# Patient Record
Sex: MALE | Race: WHITE | Employment: OTHER | URBAN - METROPOLITAN AREA
[De-identification: names, ages, dates, MRNs, and addresses within clinical notes are randomized per-mention and may not be internally consistent; named-entity substitution may affect disease eponyms.]

---

## 2017-01-26 ENCOUNTER — GENERIC CONVERSION - ENCOUNTER (OUTPATIENT)
Dept: OTHER | Facility: OTHER | Age: 82
End: 2017-01-26

## 2017-01-26 LAB
LEFT EYE DIABETIC RETINOPATHY: NORMAL
RIGHT EYE DIABETIC RETINOPATHY: NORMAL

## 2017-01-27 ENCOUNTER — GENERIC CONVERSION - ENCOUNTER (OUTPATIENT)
Dept: OTHER | Facility: OTHER | Age: 82
End: 2017-01-27

## 2017-02-14 ENCOUNTER — GENERIC CONVERSION - ENCOUNTER (OUTPATIENT)
Dept: OTHER | Facility: OTHER | Age: 82
End: 2017-02-14

## 2017-02-15 LAB — T4 FREE SERPL-MCNC: 1.22 NG/DL (ref 0.82–1.77)

## 2017-03-24 ENCOUNTER — ALLSCRIPTS OFFICE VISIT (OUTPATIENT)
Dept: OTHER | Facility: OTHER | Age: 82
End: 2017-03-24

## 2017-03-24 LAB — OCCULT BLD, FECAL IMMUNOLOGICAL (HISTORICAL): NEGATIVE

## 2017-03-27 ENCOUNTER — GENERIC CONVERSION - ENCOUNTER (OUTPATIENT)
Dept: OTHER | Facility: OTHER | Age: 82
End: 2017-03-27

## 2017-03-29 ENCOUNTER — ALLSCRIPTS OFFICE VISIT (OUTPATIENT)
Dept: OTHER | Facility: OTHER | Age: 82
End: 2017-03-29

## 2017-08-03 ENCOUNTER — GENERIC CONVERSION - ENCOUNTER (OUTPATIENT)
Dept: OTHER | Facility: OTHER | Age: 82
End: 2017-08-03

## 2017-08-03 LAB
25(OH)D3 SERPL-MCNC: 60.8 NG/ML (ref 30–100)
A/G RATIO (HISTORICAL): 1.5 (ref 1.2–2.2)
ALBUMIN SERPL BCP-MCNC: 4.3 G/DL (ref 3.5–4.7)
ALP SERPL-CCNC: 69 IU/L (ref 39–117)
ALT SERPL W P-5'-P-CCNC: 22 IU/L (ref 0–44)
AST SERPL W P-5'-P-CCNC: 28 IU/L (ref 0–40)
BASOPHILS # BLD AUTO: 0.1 X10E3/UL (ref 0–0.2)
BASOPHILS # BLD AUTO: 1 %
BILIRUB SERPL-MCNC: 0.6 MG/DL (ref 0–1.2)
BUN SERPL-MCNC: 17 MG/DL (ref 8–27)
BUN/CREA RATIO (HISTORICAL): 14 (ref 10–24)
CALCIUM SERPL-MCNC: 9.3 MG/DL (ref 8.6–10.2)
CHLORIDE SERPL-SCNC: 100 MMOL/L (ref 96–106)
CHOLEST SERPL-MCNC: 145 MG/DL (ref 100–199)
CHOLEST/HDLC SERPL: 2.2 RATIO UNITS (ref 0–5)
CO2 SERPL-SCNC: 25 MMOL/L (ref 18–29)
CREAT SERPL-MCNC: 1.21 MG/DL (ref 0.76–1.27)
DEPRECATED RDW RBC AUTO: 17 % (ref 12.3–15.4)
EGFR AFRICAN AMERICAN (HISTORICAL): 65 ML/MIN/1.73
EGFR-AMERICAN CALC (HISTORICAL): 56 ML/MIN/1.73
EOSINOPHIL # BLD AUTO: 0.2 X10E3/UL (ref 0–0.4)
EOSINOPHIL # BLD AUTO: 4 %
GGT (HISTORICAL): 18 IU/L (ref 0–65)
GLUCOSE SERPL-MCNC: 103 MG/DL (ref 65–99)
HBA1C MFR BLD HPLC: 5.5 % (ref 4.8–5.6)
HCT VFR BLD AUTO: 34.4 % (ref 37.5–51)
HDLC SERPL-MCNC: 66 MG/DL
HGB BLD-MCNC: 11.1 G/DL (ref 12.6–17.7)
IMM.GRANULOCYTES (CD4/8) (HISTORICAL): 0 %
IMM.GRANULOCYTES (CD4/8) (HISTORICAL): 0 X10E3/UL (ref 0–0.1)
LDLC SERPL CALC-MCNC: 68 MG/DL (ref 0–99)
LYMPHOCYTES # BLD AUTO: 1.4 X10E3/UL (ref 0.7–3.1)
LYMPHOCYTES # BLD AUTO: 28 %
MAGNESIUM SERPL-MCNC: 1.7 MG/DL (ref 1.6–2.3)
MCH RBC QN AUTO: 25.4 PG (ref 26.6–33)
MCHC RBC AUTO-ENTMCNC: 32.3 G/DL (ref 31.5–35.7)
MCV RBC AUTO: 79 FL (ref 79–97)
MONOCYTES # BLD AUTO: 0.7 X10E3/UL (ref 0.1–0.9)
MONOCYTES (HISTORICAL): 14 %
NEUTROPHILS # BLD AUTO: 2.6 X10E3/UL (ref 1.4–7)
NEUTROPHILS # BLD AUTO: 53 %
PLATELET # BLD AUTO: 216 X10E3/UL (ref 150–379)
POTASSIUM SERPL-SCNC: 4.5 MMOL/L (ref 3.5–5.2)
RBC (HISTORICAL): 4.37 X10E6/UL (ref 4.14–5.8)
SODIUM SERPL-SCNC: 141 MMOL/L (ref 134–144)
TOT. GLOBULIN, SERUM (HISTORICAL): 2.8 G/DL (ref 1.5–4.5)
TOTAL PROTEIN (HISTORICAL): 7.1 G/DL (ref 6–8.5)
TRIGL SERPL-MCNC: 54 MG/DL (ref 0–149)
VLDLC SERPL CALC-MCNC: 11 MG/DL (ref 5–40)
WBC # BLD AUTO: 4.9 X10E3/UL (ref 3.4–10.8)

## 2017-08-04 LAB
BACTERIA UR QL AUTO: ABNORMAL
BILIRUB UR QL STRIP: NEGATIVE
COLOR UR: YELLOW
COMMENT (HISTORICAL): CLEAR
FECAL OCCULT BLOOD DIAGNOSTIC (HISTORICAL): NEGATIVE
GLUCOSE (HISTORICAL): NEGATIVE
KETONES UR STRIP-MCNC: NEGATIVE MG/DL
LEUKOCYTE ESTERASE UR QL STRIP: NEGATIVE
MICROSCOPIC EXAMINATION (HISTORICAL): ABNORMAL
MUCUS THREADS (HISTORICAL): PRESENT
NITRITE UR QL STRIP: NEGATIVE
NON-SQ EPI CELLS URNS QL MICRO: ABNORMAL /HPF
PH UR STRIP.AUTO: 6 [PH] (ref 5–7.5)
PROT UR STRIP-MCNC: ABNORMAL MG/DL
RBC (HISTORICAL): ABNORMAL /HPF
SP GR UR STRIP.AUTO: 1.02 (ref 1–1.03)
T4 FREE SERPL-MCNC: 1.13 NG/DL (ref 0.82–1.77)
TSH SERPL DL<=0.05 MIU/L-ACNC: 4.68 UIU/ML (ref 0.45–4.5)
UROBILINOGEN UR QL STRIP.AUTO: 0.2 EU/DL (ref 0.2–1)
WBC # BLD AUTO: ABNORMAL /HPF

## 2017-08-09 ENCOUNTER — ALLSCRIPTS OFFICE VISIT (OUTPATIENT)
Dept: OTHER | Facility: OTHER | Age: 82
End: 2017-08-09

## 2017-10-18 ENCOUNTER — TRANSCRIBE ORDERS (OUTPATIENT)
Dept: LAB | Facility: HOSPITAL | Age: 82
End: 2017-10-18

## 2017-10-18 ENCOUNTER — APPOINTMENT (OUTPATIENT)
Dept: LAB | Facility: HOSPITAL | Age: 82
End: 2017-10-18
Attending: INTERNAL MEDICINE
Payer: COMMERCIAL

## 2017-10-18 DIAGNOSIS — M35.3 POLYMYALGIA RHEUMATICA (HCC): Primary | ICD-10-CM

## 2017-10-18 DIAGNOSIS — M35.3 POLYMYALGIA RHEUMATICA (HCC): ICD-10-CM

## 2017-10-18 DIAGNOSIS — G44.89 OTHER HEADACHE SYNDROME: ICD-10-CM

## 2017-10-18 DIAGNOSIS — M31.5 GIANT CELL ARTERITIS WITH POLYMYALGIA RHEUMATICA (HCC): ICD-10-CM

## 2017-10-18 LAB
BASOPHILS # BLD AUTO: 0.05 THOUSANDS/ΜL (ref 0–0.1)
BASOPHILS NFR BLD AUTO: 1 % (ref 0–1)
CRP SERPL QL: <3 MG/L
EOSINOPHIL # BLD AUTO: 0.1 THOUSAND/ΜL (ref 0–0.61)
EOSINOPHIL NFR BLD AUTO: 2 % (ref 0–6)
ERYTHROCYTE [DISTWIDTH] IN BLOOD BY AUTOMATED COUNT: 19.4 % (ref 11.6–15.1)
ERYTHROCYTE [SEDIMENTATION RATE] IN BLOOD: 5 MM/HOUR (ref 0–10)
HCT VFR BLD AUTO: 38.3 % (ref 36.5–49.3)
HGB BLD-MCNC: 12.8 G/DL (ref 12–17)
LYMPHOCYTES # BLD AUTO: 1.28 THOUSANDS/ΜL (ref 0.6–4.47)
LYMPHOCYTES NFR BLD AUTO: 19 % (ref 14–44)
MCH RBC QN AUTO: 28 PG (ref 26.8–34.3)
MCHC RBC AUTO-ENTMCNC: 33.4 G/DL (ref 31.4–37.4)
MCV RBC AUTO: 84 FL (ref 82–98)
MONOCYTES # BLD AUTO: 0.63 THOUSAND/ΜL (ref 0.17–1.22)
MONOCYTES NFR BLD AUTO: 9 % (ref 4–12)
NEUTROPHILS # BLD AUTO: 4.68 THOUSANDS/ΜL (ref 1.85–7.62)
NEUTS SEG NFR BLD AUTO: 69 % (ref 43–75)
NRBC BLD AUTO-RTO: 0 /100 WBCS
PLATELET # BLD AUTO: 186 THOUSANDS/UL (ref 149–390)
PMV BLD AUTO: 10.5 FL (ref 8.9–12.7)
RBC # BLD AUTO: 4.57 MILLION/UL (ref 3.88–5.62)
WBC # BLD AUTO: 6.77 THOUSAND/UL (ref 4.31–10.16)

## 2017-10-18 PROCEDURE — 85652 RBC SED RATE AUTOMATED: CPT

## 2017-10-18 PROCEDURE — 36415 COLL VENOUS BLD VENIPUNCTURE: CPT

## 2017-10-18 PROCEDURE — 85025 COMPLETE CBC W/AUTO DIFF WBC: CPT

## 2017-10-18 PROCEDURE — 86140 C-REACTIVE PROTEIN: CPT

## 2017-10-19 ENCOUNTER — GENERIC CONVERSION - ENCOUNTER (OUTPATIENT)
Dept: OTHER | Facility: OTHER | Age: 82
End: 2017-10-19

## 2017-10-19 ENCOUNTER — HOSPITAL ENCOUNTER (OUTPATIENT)
Dept: NON INVASIVE DIAGNOSTICS | Facility: HOSPITAL | Age: 82
Discharge: HOME | End: 2017-10-19
Attending: INTERNAL MEDICINE
Payer: COMMERCIAL

## 2017-10-19 DIAGNOSIS — M31.5 GIANT CELL ARTERITIS WITH POLYMYALGIA RHEUMATICA (HCC): ICD-10-CM

## 2017-10-19 DIAGNOSIS — G44.89 OTHER HEADACHE SYNDROME: ICD-10-CM

## 2017-10-19 PROCEDURE — 93882 EXTRACRANIAL UNI/LTD STUDY: CPT

## 2017-10-26 ENCOUNTER — GENERIC CONVERSION - ENCOUNTER (OUTPATIENT)
Dept: OTHER | Facility: OTHER | Age: 82
End: 2017-10-26

## 2017-11-14 ENCOUNTER — GENERIC CONVERSION - ENCOUNTER (OUTPATIENT)
Dept: OTHER | Facility: OTHER | Age: 82
End: 2017-11-14

## 2017-11-14 LAB
BASOPHILS # BLD AUTO: 0 X10E3/UL (ref 0–0.2)
BASOPHILS # BLD AUTO: 1 %
BUN SERPL-MCNC: 20 MG/DL (ref 8–27)
BUN/CREA RATIO (HISTORICAL): 19 (ref 10–24)
CALCIUM SERPL-MCNC: 9.7 MG/DL (ref 8.6–10.2)
CHLORIDE SERPL-SCNC: 98 MMOL/L (ref 96–106)
CO2 SERPL-SCNC: 26 MMOL/L (ref 18–29)
CREAT SERPL-MCNC: 1.04 MG/DL (ref 0.76–1.27)
CREATININE, URINE (HISTORICAL): 124.2 MG/DL
DEPRECATED RDW RBC AUTO: 18.9 % (ref 12.3–15.4)
EGFR AFRICAN AMERICAN (HISTORICAL): 77 ML/MIN/1.73
EGFR-AMERICAN CALC (HISTORICAL): 67 ML/MIN/1.73
EOSINOPHIL # BLD AUTO: 0.1 X10E3/UL (ref 0–0.4)
EOSINOPHIL # BLD AUTO: 2 %
GLUCOSE SERPL-MCNC: 104 MG/DL (ref 65–99)
HBA1C MFR BLD HPLC: 5.6 % (ref 4.8–5.6)
HCT VFR BLD AUTO: 39 % (ref 37.5–51)
HGB BLD-MCNC: 12.6 G/DL (ref 12.6–17.7)
IMM.GRANULOCYTES (CD4/8) (HISTORICAL): 0 %
IMM.GRANULOCYTES (CD4/8) (HISTORICAL): 0 X10E3/UL (ref 0–0.1)
LYMPHOCYTES # BLD AUTO: 1 X10E3/UL (ref 0.7–3.1)
LYMPHOCYTES # BLD AUTO: 18 %
MAGNESIUM SERPL-MCNC: 1.7 MG/DL (ref 1.6–2.3)
MCH RBC QN AUTO: 28.6 PG (ref 26.6–33)
MCHC RBC AUTO-ENTMCNC: 32.3 G/DL (ref 31.5–35.7)
MCV RBC AUTO: 89 FL (ref 79–97)
MICROALBUM.,U,RANDOM (HISTORICAL): 1470.3 UG/ML
MICROALBUMIN/CREATININE RATIO (HISTORICAL): 1183.8 MG/G CREAT (ref 0–30)
MONOCYTES # BLD AUTO: 0.5 X10E3/UL (ref 0.1–0.9)
MONOCYTES (HISTORICAL): 9 %
NEUTROPHILS # BLD AUTO: 3.8 X10E3/UL (ref 1.4–7)
NEUTROPHILS # BLD AUTO: 70 %
PLATELET # BLD AUTO: 135 X10E3/UL (ref 150–379)
POTASSIUM SERPL-SCNC: 4.5 MMOL/L (ref 3.5–5.2)
RBC (HISTORICAL): 4.4 X10E6/UL (ref 4.14–5.8)
SODIUM SERPL-SCNC: 141 MMOL/L (ref 134–144)
WBC # BLD AUTO: 5.5 X10E3/UL (ref 3.4–10.8)

## 2017-11-15 LAB
T4 FREE SERPL-MCNC: 1.13 NG/DL (ref 0.82–1.77)
TSH SERPL DL<=0.05 MIU/L-ACNC: 3.04 UIU/ML (ref 0.45–4.5)

## 2017-11-17 ENCOUNTER — GENERIC CONVERSION - ENCOUNTER (OUTPATIENT)
Dept: OTHER | Facility: OTHER | Age: 82
End: 2017-11-17

## 2017-11-27 LAB
BASOPHILS # BLD AUTO: 0 X10E3/UL (ref 0–0.2)
BASOPHILS # BLD AUTO: 1 %
DEPRECATED RDW RBC AUTO: 18 % (ref 12.3–15.4)
EOSINOPHIL # BLD AUTO: 0.1 X10E3/UL (ref 0–0.4)
EOSINOPHIL # BLD AUTO: 1 %
HCT VFR BLD AUTO: 36.5 % (ref 37.5–51)
HGB BLD-MCNC: 12.3 G/DL (ref 12.6–17.7)
IMM.GRANULOCYTES (CD4/8) (HISTORICAL): 0 %
IMM.GRANULOCYTES (CD4/8) (HISTORICAL): 0 X10E3/UL (ref 0–0.1)
LYMPHOCYTES # BLD AUTO: 1.1 X10E3/UL (ref 0.7–3.1)
LYMPHOCYTES # BLD AUTO: 13 %
MCH RBC QN AUTO: 29.1 PG (ref 26.6–33)
MCHC RBC AUTO-ENTMCNC: 33.7 G/DL (ref 31.5–35.7)
MCV RBC AUTO: 86 FL (ref 79–97)
MONOCYTES # BLD AUTO: 0.7 X10E3/UL (ref 0.1–0.9)
MONOCYTES (HISTORICAL): 8 %
NEUTROPHILS # BLD AUTO: 6 X10E3/UL (ref 1.4–7)
NEUTROPHILS # BLD AUTO: 77 %
PLATELET # BLD AUTO: 185 X10E3/UL (ref 150–379)
RBC (HISTORICAL): 4.23 X10E6/UL (ref 4.14–5.8)
WBC # BLD AUTO: 7.8 X10E3/UL (ref 3.4–10.8)

## 2017-12-06 ENCOUNTER — ALLSCRIPTS OFFICE VISIT (OUTPATIENT)
Dept: OTHER | Facility: OTHER | Age: 82
End: 2017-12-06

## 2017-12-07 NOTE — PROGRESS NOTES
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)   6  Diabetes mellitus type II, controlled (250 00) (E11 9)    Plan  Allergic rhinitis    · Fluticasone Propionate 50 MCG/ACT Nasal Suspension; USE 2 SPRAYS IN EACH NOSTRILDAILY  Hypertension    · Azelastine HCl - 0 15 % Nasal Solution; INHALE 1 SPRAY Intranasally Twice daily   · Azithromycin 250 MG Oral Tablet; TAKE 2 TABLETS ON DAY 1 THEN TAKE 1 TABLET A DAYFOR 4 DAYS   · Indapamide 2 5 MG Oral Tablet; TAKE 1 TABLET DAILY   · (1) BASIC METABOLIC PROFILE; Status:Active; Requested for:43Eqb1547;    · (1) CBC/PLT/DIFF; Status:Active; Requested for:77Sfn7370;    · (1) MAGNESIUM; Status:Active; Requested for:34Dza1170;    · (1) MICROALBUMIN CREATININE RATIO, RANDOM URINE; Status:Active; Requested for:71Tha0558;    · (1) URINALYSIS (will reflex a microscopy if leukocytes, occult blood, protein or nitrites are not withinnormal limits); Status:Active; Requested for:00Brb3898;    · Follow-up visit in 2 months Evaluation and Treatment  Follow-up  Status: Complete  Done:43Wvp5219  Sexual dysfunction    · Viagra 50 MG Oral Tablet; take 1 tablet daily 1 hour before needed    Discussion/Summary  Discussion Summary:   Diabetes well controlpressure well controlof the aorta checked by vascular recently stablerheumatica prednisone is being tapered by Rheumatology 5 mg per dayprotein in the urine as well as elevated blood pressure and increase the indapamide to 2 5 mg per day will continue on the same ramipril told mg per day will have him come back in 8 weeks the microalbumin creatinine ratio will be repeated if that is elevated the ramipril will be increased to 20 mg per day  samples givensee him back in 8 weeks     Counseling Documentation With Imm: The patient was counseled regarding diagnostic results,-- instructions for management,-- risk factor reductions,-- prognosis,-- impressions,-- risks and benefits of treatment options,-- importance of compliance with treatment  Medication SE Review and Pt Understands Tx: Possible side effects of new medications were reviewed with the patient/guardian today  The treatment plan was reviewed with the patient/guardian  The patient/guardian understands and agrees with the treatment plan      Chief Complaint  Chief Complaint Free Text Note Form: 4 month follow up for HTN and Hyperlipidemia  not felling well x1 week  History of Present Illness  HPI: Problem 1  High blood pressure not that the well control is 160/80 standing 150/80  He had increasing protein in the urine  We will do the following will continue on ramipril 10 mg per day and will increase the indapamide to 2 5 mg daily will check a basic metabolic panel magnesium microalbumin creatinine ratio in about 8 weeks  Continue all the other medications the same  2  Abdominal aortic aneurysm recently evaluated by Dr wayne here stable  3  Hyperlipidemia on a statin no myalgias  4  Polymyalgia rheumatica sed rate is low as the well as the CRP the prednisone is tapered down to 5 mg per day  has some sinus congestion  No fever chills nontoxic I told him to take Flonase nasal spray with Astelin if symptoms persist to start a Z-Celso  Can take Tylenol for pain  dysfunction due to peripheral vascular disease he takes Viagra was reorder and samples were given  Review of Systems  Complete-Male:  Constitutional: No fever or chills, feels well, no tiredness, no recent weight gain or weight loss  Eyes: No complaints of eye pain, no red eyes, no discharge from eyes, no itchy eyes  ENT: no complaints of earache, no hearing loss, no nosebleeds, no nasal discharge, no sore throat, no hoarseness    Cardiovascular: No complaints of slow heart rate, no fast heart rate, no chest pain, no palpitations, no leg claudication, no lower extremity  Respiratory: No complaints of shortness of breath, no wheezing, no cough, no SOB on exertion, no orthopnea or PND  Gastrointestinal: No complaints of abdominal pain, no constipation, no nausea or vomiting, no diarrhea or bloody stools  Genitourinary: No complaints of dysuria, no incontinence, no hesitancy, no nocturia, no genital lesion, no testicular pain  Musculoskeletal: No complaints of arthralgia, no myalgias, no joint swelling or stiffness, no limb pain or swelling  Integumentary: No complaints of skin rash or skin lesions, no itching, no skin wound, no dry skin  Neurological: No compliants of headache, no confusion, no convulsions, no numbness or tingling, no dizziness or fainting, no limb weakness, no difficulty walking  Psychiatric: Is not suicidal, no sleep disturbances, no anxiety or depression, no change in personality, no emotional problems  Endocrine: No complaints of proptosis, no hot flashes, no muscle weakness, no erectile dysfunction, no deepening of the voice, no feelings of weakness  Hematologic/Lymphatic: No complaints of swollen glands, no swollen glands in the neck, does not bleed easily, no easy bruising  Active Problems  1  Acute sinusitis (461 9) (J01 90)   2  Allergic rhinitis (477 9) (J30 9)   3  Anemia (285 9) (D64 9)   4  Aneurysm of abdominal aorta (441 4) (I71 4)   5  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,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)   6  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)   7  Benign prostatic hypertrophy (600 00) (N40 0)   8  Cataract (366 9) (H26 9)   9  Colonoscopy (Fiberoptic) Screening   10  Cough (786 2) (R05)   11  Depression screening (V79 0) (Z13 89)   12  Diabetes mellitus type II, controlled (250 00) (E11 9)   13  Ear discomfort (388 70) (H92 09)   14  Fecal occult blood test positive (792 1) (R19 5)   15  Folic acid deficiency anemia (281 2) (D52 9)   16  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)   17   Glaucoma screening (V80 1) (Z13 5)   18  Hyperlipidemia (272 4) (E78 5)   19  Hypertension (401 9) (I10)   20  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 31) (D69 3)   21  Impaired fasting glucose (790 21) (R73 01)   22  Iron deficiency anemia (280 9) (D50 9)   23  Need for pneumococcal vaccination (V03 82) (Z23)   24  Need for prophylactic vaccination and inoculation against influenza (V04 81) (Z23)   25  Numbness of leg (782 0) (R20 0)   26  Polyarthritis (716 50) (M13 0)   27  Polymyalgia rheumatica (725) (M35 3)   28  Screening for genitourinary condition (V81 6) (Z13 89)   29  Screening for neurological condition (V80 09) (Z13 89)   30  Sexual dysfunction (302 70) (R37)   31  Visit for pre-operative examination (V72 84) (Z01 818)   32  Vitamin D deficiency (268 9) (E55 9)    Past Medical History  1  History of Acute Myocardial Infarction (V12 59)   2  History of peripheral vascular disease (V12 59) (Z86 79)    Surgical History  1  History of Cath Stent Placement    Family History  Family History Reviewed: The family history was reviewed and updated today  Social History     · Former smoker (S70 09) (W39 101)  Social History Reviewed: The social history was reviewed and updated today  Current Meds   1  Aleve 220 MG Oral Tablet; TAKE 2 TABLETS TWICE DAILY; Therapy: 22Nov2016 to Recorded   2  Aspirin 81 MG TABS; TAKE 1 TABLET DAILY; Therapy: (Recorded:08Dhd0600) to Recorded   3  Atorvastatin Calcium 40 MG Oral Tablet; take 1 tablet by mouth once daily; Therapy: 84ENC8565 to (Evaluate:14Mar2018)  Requested for: 27CVC6162; Last Rx:76Tod1167 Ordered   4  Bisoprolol Fumarate 5 MG Oral Tablet; take 1 tablet by mouth once daily; Therapy: 19Uki2724 to (Evaluate:31Xsf3748)  Requested for: 82BAB4288; Last Rx:16Nov2017 Ordered   5  Cortisporin-TC 3 3-3-10-0 5 MG/ML SUSP; place 5 to 10 drops in right ear daily; Therapy: 14CBJ0563 to (Hilary Wright)  Requested for: 07UJI3043; Last Rx:21Oct2014 Ordered   6   Ferrous Sulfate 325 (65 Fe) MG Oral Tablet; ONE TABLET  AND FRIDAY WEEKLY DAILY WITH FOOD; Therapy: 52GPO2756 to (Evaluate:2014); Last Rx:28Lyr5457 Ordered   7  Fluticasone Propionate 50 MCG/ACT Nasal Suspension; USE 2 SPRAYS IN EACH NOSTRIL DAILY; Therapy: 07TMH9130 to (Evaluate:2016)  Requested for: 45XHJ2911; Last H93LPV2508 Ordered   8  Folic Acid 1 MG Oral Tablet; take 1 tablet by mouth once daily; Therapy: 17DUH1179 to (Evaluate:2018)  Requested for: 82MMJ9203; Last Rx:61Lww9555 Ordered   9  Indapamide 1 25 MG Oral Tablet; take 1 tablet by mouth once daily; Therapy: 04Jmr6952 to (Evaluate:2017)  Requested for: 63NAV9684; Last Rx:22Wet5169 Ordered   10  Lancets Miscellaneous; TEST BS BID; Therapy: 14OED0967 to (Dany Elise)  Requested for: 79TTX6596; Last Rx:2016  Ordered   11  Lysine HCl - 1000 MG Oral Tablet; TAKE AS DIRECTED; Therapy: (Recorded:51Rqb9036) to Recorded   12  Multivitamins TABS; TAKE 1 TABLET DAILY; Therapy: (Recorded:47Qkh1109) to Recorded   13  Omeprazole 40 MG Oral Capsule Delayed Release; TAKE ONE CAPSULE BY MOUTH EVERY DAY; Therapy: 85SRL2405 to (Evaluate:2018)  Requested for: 2017; Last Rx:2017  Ordered   14  PredniSONE 5 MG Oral Tablet; Dr Elvira Kemp;  Therapy: 71BQN5421 to Recorded   15  Ramipril 10 MG Oral Capsule; take 1 capsule by mouth once daily; Therapy: 96Qvz0637 to (Evaluate:62Drn1100)  Requested for: 64DHU6592; Last Rx:2017  Ordered   16  Slow Magnesium/Calcium  MG TBEC; TAKE TWO TABLETS BY MOUTH DAILY; Therapy: (Recorded:00Nmb5823) to Recorded   17  Tamsulosin HCl - 0 4 MG Oral Capsule; take 1 capsule by mouth once daily; Therapy: 16IGX3013 to (Evaluate:2018)  Requested for: 07GSU6882; Last Rx:2017  Ordered   18  TrueTrack Test In Vitro Strip; TEST TWICE A DAY; Therapy: 06HRX1011 to (Evaluate:36Eiy9277)  Requested for: 21BDL5260; Last Rx:93Pzo3282  Ordered   19   Viagra 50 MG Oral Tablet; take 1 tablet daily 1 hour before needed; Therapy: 76AWN8289 to (Evaluate:87Zgi5655)  Requested for: 28KPG4697; Last Rx:16Nov2017  Ordered   20  Vitamin D3 1000 UNIT Oral Capsule; TAKE 1 CAPSULE ON MONDAY, WEDNESDAY AND FRIDAY  WEEKLY; Therapy: 53QTB3175 to (Evaluate:14Jun2018)  Requested for: 99SPA4263; Last Rx:16Nov2017  Ordered  Medication List Reviewed: The medication list was reviewed and updated today  Allergies  1  Bactrim TABS    Vitals  Vital Signs    Recorded: 91ALQ3356 01:22PM   Temperature 98 1 F   Heart Rate 69   Respiration 15   Systolic 695   Diastolic 73   Height 5 ft 6 in   Weight 188 lb 2 oz   BMI Calculated 30 36   BSA Calculated 1 95       Physical Exam   Constitutional  General appearance: Abnormal   chronically ill-- and-- obese  Eyes  Conjunctiva and lids: No swelling, erythema, or discharge  Pupils and irises: Equal, round and reactive to light  Ears, Nose, Mouth, and Throat  External inspection of ears and nose: Normal    Otoscopic examination: Tympanic membrance translucent with normal light reflex  Canals patent without erythema  Nasal mucosa, septum, and turbinates: Normal without edema or erythema  Oropharynx: Normal with no erythema, edema, exudate or lesions  Pulmonary  Respiratory effort: No increased work of breathing or signs of respiratory distress  Auscultation of lungs: Clear to auscultation, equal breath sounds bilaterally, no wheezes, no rales, no rhonci  Cardiovascular  Palpation of heart: Normal PMI, no thrills  Auscultation of heart: Abnormal   The rhythm was regular  Heart sounds: normal S1,-- normal S2-- and-- no gallop heard  Examination of extremities for edema and/or varicosities: Normal    Carotid pulses: Normal    Abdomen  Abdomen: Abnormal   The abdomen was rounded-- and-- obese  Bowel sounds were normal  The abdomen was soft and nontender  Liver and spleen: No hepatomegaly or splenomegaly  Lymphatic  Palpation of lymph nodes in neck: No lymphadenopathy  Musculoskeletal  Gait and station: Normal    Skin  Skin and subcutaneous tissue: Normal without rashes or lesions  Psychiatric  Orientation to person, place and time: Normal    Mood and affect: Normal          Health Management  Colonoscopy (Fiberoptic) Screening   COLONOSCOPY; every 3 years; Last 20CRW2203; Next Due: 67DEA5653;  Overdue    Signatures   Electronically signed by : KEYANA Hsu ; Dec  6 2017  2:02PM EST                       (Author)

## 2018-01-10 NOTE — PROGRESS NOTES
Plan    1  DSMT/MNT Time Record; Status:Complete;   Done: 60FUN3440 03:06PM    Discussion/Summary    PATIENT EDUCATION RECORD   Indication for Services: type 2 Diabetes Mellitus  Living Well with Diabetes Class 2 Education Content: Macronutrients, Carbohydrate sources, What one serving of carbohydrate equals, Effects of diet on blood glucose levels, effects of carbohydrates on blood glucose levels, Basics of meal planning: balance, portions, and meal times, Measurements, Reading food labels to determine carbohydrates       Active Problems    1  Acute sinusitis (461 9) (J01 90)   2  Allergic rhinitis (477 9) (J30 9)   3  Anemia (285 9) (D64 9)   4  Aneurysm of abdominal aorta (441 4) (I71 4)   5  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,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)   6  [place] esophagus (530 85) (K22 70)   7  Benign prostatic hypertrophy (600 00) (N40 0)   8  Cataract (366 9) (H26 9)   9  Colonoscopy (Fiberoptic) Screening   10  Cough (786 2) (R05)   11  Depression screening (V79 0) (Z13 89)   12  Diabetes mellitus type II, controlled (250 00) (E11 9)   13  Ear discomfort (388 70) (H92 09)   14  Fecal occult blood test positive (792 1) (R19 5)   15  Folic acid deficiency anemia (281 2) (D52 9)   16  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)   17  Glaucoma screening (V80 1) (Z13 5)   18  Hyperlipidemia (272 4) (E78 5)   19  Hypertension (401 9) (I10)   20  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 31) (D69 3)   21  Impaired fasting glucose (790 21) (R73 01)   22  Iron deficiency anemia (280 9) (D50 9)   23  Need for pneumococcal vaccination (V03 82) (Z23)   24  Need for prophylactic vaccination and inoculation against influenza (V04 81) (Z23)   25  Numbness of leg (782 0) (R20 0)   26  Screening for genitourinary condition (V81 6) (Z13 89)   27  Screening for neurological condition (V80 09) (Z13 89)   28  Sexual dysfunction (302 70) (R37)   29  Visit for pre-operative examination (V72 84) (Z01 818)   30   Vitamin D deficiency (268 9) (E55 9)    Past Medical History    1  History of Acute Myocardial Infarction (V12 59)   2  History of peripheral vascular disease (V12 59) (Z86 79)    Surgical History    1  History of Cath Stent Placement    Social History    · Former smoker (B93 80) (Z38 175)    Current Meds   1  Aspirin 81 MG TABS; TAKE 1 TABLET DAILY; Therapy: (Recorded:60Mrs9160) to Recorded   2  Atorvastatin Calcium 40 MG Oral Tablet; take 1 tablet by mouth once daily; Therapy: 08ZDB1638 to (Evaluate:26Oct2016)  Requested for: 48NBQ9437; Last   Rx:22Hay7177 Ordered   3  Bisoprolol Fumarate 5 MG Oral Tablet; take 1 tablet by mouth once daily; Therapy: 81Lmd8000 to (Tia Polk)  Requested for: 98NYI0873; Last   Rx:01Jun2016 Ordered   4  Blood Glucose Monitor System w/Device Kit; TEST BS BID; Therapy: 85GSM5634 to (Evaluate:02Jun2016)  Requested for: 00KAC4412; Last   Rx:03May2016 Ordered   5  Blood Glucose Test In Vitro Strip; TEST BLOOD SUGARS BID; Therapy: 23OMC5641 to (Evaluate:02Jun2016)  Requested for: 82ARF2756; Last   Rx:03May2016 Ordered   6  Cortisporin-TC 3 3-3-10-0 5 MG/ML SUSP; place 5 to 10 drops in right ear daily; Therapy: 32OBS5598 to (Dayana Hardin)  Requested for: 95BLX9083; Last   Rx:21Oct2014 Ordered   7  D 1000 1000 UNIT Oral Capsule; take 1 capsule daily; Therapy: 97POU7544 to [phone])  Requested for: 79RXL2618; Last   Rx:19Jan2015 Ordered   8  Ferrous Sulfate 325 (65 Fe) MG Oral Tablet; ONE TABLET MONDAY WEDNESDAY AND   FRIDAY WEEKLY DAILY WITH FOOD; Therapy: 09BWC9169 to (Evaluate:23Mar2014); Last Rx:92Wgl6562 Ordered   9  Fluticasone Propionate 50 MCG/ACT Nasal Suspension; USE 2 SPRAYS IN EACH   NOSTRIL DAILY; Therapy: 65UVK4909 to (Evaluate:20Jan2016)  Requested for: 78DDD2974; Last   XW:04XMI1764 Ordered   10  Folic Acid 1 MG Oral Tablet; take 1 tablet by mouth once daily;     Therapy: 45IVE9140 to ([phone])  Requested for: 37IPV5163; Last Rx:66Uwd6038 Ordered   11  Indapamide 1 25 MG Oral Tablet; take 1 tablet by mouth once daily 1 tablet by mouth    once daily; Therapy: 72Qsw2384 to (Evaluate:26Sep2016)  Requested for: 61BZT3094; Last    Rx:76Vpr9022 Ordered   12  Lancets Miscellaneous; TEST BS BID; Therapy: 60PGV2479 to (Enid Hardin)  Requested for: 39KVP0724; Last    Rx:24Wwo8187 Ordered   15  Lysine HCl - 1000 MG Oral Tablet; TAKE AS DIRECTED; Therapy: (Recorded:68Qcm9475) to Recorded   14  Multivitamins TABS; TAKE 1 TABLET DAILY; Therapy: (Recorded:42Mfg8140) to Recorded   15  Omeprazole 20 MG Oral Capsule Delayed Release; take 1 capsule by mouth twice a    day; Therapy: 09GFH7385 to (Evaluate:55Szm3241)  Requested for: 37UUH3463; Last    Rx:67Eiq4792 Ordered   16  Ramipril 10 MG Oral Capsule; take 1 capsule by mouth once daily; Therapy: 23Bks0299 to (Evaluate:26Sep2016)  Requested for: 88RTX6130; Last    Rx:17Pxx6271 Ordered   17  Slow Magnesium/Calcium  MG Oral Tablet Delayed Release; TAKE TWO    TABLETS BY MOUTH DAILY; Therapy: (Recorded:95Nfy0868) to Recorded   18  Tamsulosin HCl - 0 4 MG Oral Capsule; take 1 capsule by mouth once daily; Therapy: 44HCF1812 to (Evaluate:04Oct2016)  Requested for: 99YWC6272; Last    Rx:08Mar2016 Ordered   19  Viagra 50 MG Oral Tablet; take 1 tablet by mouth 1 hour BEFORE NEEDED; Therapy: 20EHY4694 to (Evaluate:20Mar2016)  Requested for: 93VLG8850; Last    Rx:22Zzg3361 Ordered   20  Vitamin D3 1000 UNIT Oral Capsule; take 1 capsule daily; Therapy: 84HAD5823 to (Evaluate:07Wke3266)  Requested for: 39YYL4159; Last    MF:38JZC2740 Ordered    Allergies    1  Bactrim TABS    End of Encounter Meds    1  Fluticasone Propionate 50 MCG/ACT Nasal Suspension; USE 2 SPRAYS IN EACH   NOSTRIL DAILY; Therapy: 70LBD2828 to (Evaluate:20Jan2016)  Requested for: 22GIX6252; Last   AW:12YGA1220 Ordered    2   Ferrous Sulfate 325 (65 Fe) MG Oral Tablet; ONE TABLET MONDAY WEDNESDAY AND FRIDAY WEEKLY DAILY WITH FOOD; Therapy: 15CVM5534 to (Evaluate:23Mar2014); Last Rx:65Nmd9914 Ordered    3  Folic Acid 1 MG Oral Tablet; take 1 tablet by mouth once daily; Therapy: 94UOG4203 to (Evaluate:26Oct2016)  Requested for: 60NJU4065; Last   Rx:25Pdy0767 Ordered    4  Bisoprolol Fumarate 5 MG Oral Tablet; take 1 tablet by mouth once daily; Therapy: 46Dww3439 to (Ivania Garcia)  Requested for: 93XDU4895; Last   Rx:01Jun2016 Ordered   5  Ramipril 10 MG Oral Capsule; take 1 capsule by mouth once daily; Therapy: 61Tww8070 to (Evaluate:26Sep2016)  Requested for: 97BVF0931; Last   Rx:29Feb2016 Ordered    6  Omeprazole 20 MG Oral Capsule Delayed Release; take 1 capsule by mouth twice a   day; Therapy: 24ROQ1805 to (Evaluate:27Aug2016)  Requested for: 34HZA8779; Last   Rx:29Feb2016 Ordered    7  Tamsulosin HCl - 0 4 MG Oral Capsule; take 1 capsule by mouth once daily; Therapy: 00WMP3088 to (Evaluate:04Oct2016)  Requested for: 53BHP0340; Last   Rx:08Mar2016 Ordered    8  Blood Glucose Monitor System w/Device Kit; TEST BS BID; Therapy: 70HWN2520 to (Evaluate:02Jun2016)  Requested for: 17IWL3009; Last   Rx:03May2016 Ordered   9  Blood Glucose Test In Vitro Strip; TEST BLOOD SUGARS BID; Therapy: 56UDL9612 to (Evaluate:02Jun2016)  Requested for: 38YKJ8953; Last   Rx:03May2016 Ordered   10  Lancets Miscellaneous; TEST BS BID; Therapy: 85IVE7975 to (Angela Hernandezkeley)  Requested for: 37MGK4894; Last    Rx:03May2016 Ordered    11  Cortisporin-TC 3 3-3-10-0 5 MG/ML SUSP; place 5 to 10 drops in right ear daily; Therapy: 28EVK7643 to (Bianka Mays)  Requested for: 30RUK2145; Last    Rx:21Oct2014 Ordered    12  Atorvastatin Calcium 40 MG Oral Tablet; take 1 tablet by mouth once daily; Therapy: 25QDW2197 to (Evaluate:26Oct2016)  Requested for: 28LFM9407; Last    Rx:85Hpv1273 Ordered    13   Indapamide 1 25 MG Oral Tablet; take 1 tablet by mouth once daily 1 tablet by mouth    once daily; Therapy: 71Dbn2363 to (Evaluate:02Rbb3043)  Requested for: 88QCU8636; Last    Rx:05Ymc0011 Ordered    14  Viagra 50 MG Oral Tablet; take 1 tablet by mouth 1 hour BEFORE NEEDED; Therapy: 31XDD8144 to (Evaluate:20Mar2016)  Requested for: 84COC8812; Last    Rx:77Qyf4785 Ordered    15  D 1000 1000 UNIT Oral Capsule; take 1 capsule daily; Therapy: 04ACM0755 to [phone])  Requested for: 59ERP8757; Last    Rx:19Jan2015 Ordered   16  Vitamin D3 1000 UNIT Oral Capsule; take 1 capsule daily; Therapy: 71PJP7267 to (Evaluate:52Fot9633)  Requested for: 77WVO2985; Last    Rx:90Ing0699 Ordered    17  Aspirin 81 MG TABS; TAKE 1 TABLET DAILY; Therapy: (Recorded:33Qbp5153) to Recorded   18  Lysine HCl - 1000 MG Oral Tablet; TAKE AS DIRECTED; Therapy: (Recorded:36Viy3222) to Recorded   19  Multivitamins TABS; TAKE 1 TABLET DAILY; Therapy: (Recorded:26Ozt5285) to Recorded   20  Slow Magnesium/Calcium  MG Oral Tablet Delayed Release; TAKE TWO    TABLETS BY MOUTH DAILY; Therapy: (Recorded:47Fpq4387) to Recorded    Future Appointments    Date/Time Provider Specialty Site   06/21/2016 09:30 AM Chato Guidry RD Diabetes Educator [facility] DIABETES EDUCATION   06/28/2016 09:30 AM Brandee Rao RD Diabetes Educator [facility] DIABETES EDUCATION   08/31/2016 01:30 PM KEYANA Perez   Internal Medicine [facility] [place]     Signatures   Electronically signed by : Kerry Shin RD; Jun 14 2016  3:06PM EST                       (Author)    Electronically signed by : KEYANA Mckenzie ; Ulysses 15 2016  8:08AM EST

## 2018-01-10 NOTE — MISCELLANEOUS
Message   Date: 09 Mar 2016 3:03 PM EST, Recorded By: Bernabe Park  Calling For: Tello Chikak: Leta Larios, Self  Phone: [phone] (Home), [phone] (Work)  Reason: Medical Complaint  Complaints of sinus infection, symptoms of sinus pressure, green/yellow nasal discharge, patient is getting over a cold from 1 1/2 weeks ago  Dr Arpit Allen will prescribe Amoxicillin 875 mg, twice daily for one week  Patient understands he is take prescribed medication and will call if symptoms do not improve  Active Problems    1  Allergic rhinitis (477 9) (J30 9)   2  Anemia (285 9) (D64 9)   3  Aneurysm of abdominal aorta (441 4) (I71 4)   4  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,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)   5  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)   6  Benign prostatic hypertrophy (600 00) (N40 0)   7  Cataract (366 9) (H26 9)   8  Colonoscopy (Fiberoptic) Screening   9  Cough (786 2) (R05)   10  Depression screening (V79 0) (Z13 89)   11  Ear discomfort (388 70) (H92 09)   12  Fecal occult blood test positive (792 1) (R19 5)   13  Folic acid deficiency anemia (281 2) (D52 9)   14  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)   15  Glaucoma screening (V80 1) (Z13 5)   16  Hyperlipidemia (272 4) (E78 5)   17  Hypertension (401 9) (I10)   18  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 31) (D69 3)   19  Impaired fasting glucose (790 21) (R73 01)   20  Iron deficiency anemia (280 9) (D50 9)   21  Need for pneumococcal vaccination (V03 82) (Z23)   22  Need for prophylactic vaccination and inoculation against influenza (V04 81) (Z23)   23  Numbness of leg (782 0) (R20 0)   24  Screening for genitourinary condition (V81 6) (Z13 89)   25  Screening for neurological condition (V80 09) (Z13 89)   26  Sexual dysfunction (302 70) (R37)   27  Visit for pre-operative examination (V72 84) (Z01 818)   28  Vitamin D deficiency (268 9) (E55 9)    Current Meds   1  Aspirin 81 MG Oral Tablet; TAKE 1 TABLET DAILY;    Therapy: (Recorded:73Neh6261) to Recorded   2  Atorvastatin Calcium 40 MG Oral Tablet; take 1 tablet by mouth once daily; Therapy: 13TAI9247 to (Evaluate:26Oct2016)  Requested for: 65TPZ5528; Last   Rx:09Acn0369 Ordered   3  Bisoprolol Fumarate 5 MG Oral Tablet; Take one (1) tablet once daily; Therapy: 71Hst6629 to (Evaluate:21May2016)  Requested for: 04BDX9261; Last   Rx:23Nov2015 Ordered   4  Cortisporin-TC 3 3-3-10-0 5 MG/ML Otic Suspension; place 5 to 10 drops in right ear   daily; Therapy: 90PUU2396 to ([facility])  Requested for: 93MKV6018; Last   Rx:21Oct2014 Ordered   5  D 1000 1000 UNIT Oral Capsule; take 1 capsule daily; Therapy: 03VOO7097 to 686 0886)  Requested for: 09HFZ4138; Last   Rx:19Jan2015 Ordered   6  Ferrous Sulfate 325 (65 Fe) MG Oral Tablet; ONE TABLET MONDAY WEDNESDAY AND   FRIDAY WEEKLY DAILY WITH FOOD; Therapy: 38BKA0028 to (Evaluate:23Mar2014); Last Rx:18Bds9181 Ordered   7  Fluticasone Propionate 50 MCG/ACT Nasal Suspension; USE 2 SPRAYS IN EACH   NOSTRIL DAILY; Therapy: 52KWA6863 to (Evaluate:20Jan2016)  Requested for: 27ADP9618; Last   WI:51AAA8286 Ordered   8  Folic Acid 1 MG Oral Tablet; take 1 tablet by mouth once daily; Therapy: 67OTE4097 to (Evaluate:26Oct2016)  Requested for: 08IPS4165; Last   Rx:94Qkm2327 Ordered   9  Indapamide 1 25 MG Oral Tablet; take 1 tablet by mouth once daily 1 tablet by mouth   once daily; Therapy: 56Glf3878 to (Evaluate:26Sep2016)  Requested for: 50VMB5316; Last   Rx:18Etd5217 Ordered   10  Lysine HCl - 1000 MG Oral Tablet; TAKE AS DIRECTED; Therapy: (Recorded:07Fzf4290) to Recorded   11  Multivitamins TABS; TAKE 1 TABLET DAILY; Therapy: (Recorded:55Hgy4354) to Recorded   12  Omeprazole 20 MG Oral Capsule Delayed Release; take 1 capsule by mouth twice a    day; Therapy: 86TJW4222 to (Evaluate:31Syv5376)  Requested for: 61XPH1889; Last    Rx:95Sdh4576 Ordered   13   Ramipril 10 MG Oral Capsule; take 1 capsule by mouth once daily; Therapy: 45Wcc9388 to (Evaluate:77Tpr8580)  Requested for: 42EBN1723; Last    Rx:02Yej3510 Ordered   14  Slow Magnesium/Calcium  MG Oral Tablet Delayed Release; TAKE TWO    TABLETS BY MOUTH DAILY; Therapy: (Recorded:51Pek9780) to Recorded   15  Tamsulosin HCl - 0 4 MG Oral Capsule; take 1 capsule by mouth once daily; Therapy: 76KWC4249 to (Evaluate:04Oct2016)  Requested for: 37USR7743; Last    Rx:08Mar2016 Ordered   16  Viagra 50 MG Oral Tablet; take 1 tablet by mouth 1 hour BEFORE NEEDED; Therapy: 47DID4260 to (Evaluate:20Mar2016)  Requested for: 50YMU9096; Last    Rx:29Feb2016 Ordered   17  Vitamin D3 1000 UNIT Oral Capsule; take 1 capsule daily; Therapy: 49JTL7009 to (Pandya Rides)  Requested for: 99NJC6361; Last    Rx:28Jan2016 Ordered    Allergies    1   Bactrim TABS    Signatures   Electronically signed by : KEYANA Jordan ; Mar  9 2016  6:58PM EST

## 2018-01-10 NOTE — PROGRESS NOTES
Plan    1  DSMT/MNT Time Record; Status:Complete;   Done: 93HYR1436 12:47PM    Discussion/Summary    PATIENT EDUCATION RECORD   Indication for Services: type 2 Diabetes Mellitus  He is ready to learn  He has no barriers to learning  Living Well with Diabetes Class 1 Education Content: What is diabetes, Types of diabetes, How is diabetes diagnosed, Management skills, Role of exercise, Glucose monitoring, Target range goals        Chief Complaint  Attending diabetes class 1      End of Encounter Meds    1  Fluticasone Propionate 50 MCG/ACT Nasal Suspension; USE 2 SPRAYS IN EACH   NOSTRIL DAILY; Therapy: 89XQK3353 to (Evaluate:20Jan2016)  Requested for: 89ENB3479; Last   SV:31ETE2201 Ordered    2  Ferrous Sulfate 325 (65 Fe) MG Oral Tablet; ONE TABLET MONDAY WEDNESDAY AND   FRIDAY WEEKLY DAILY WITH FOOD; Therapy: 74CFK3284 to (Evaluate:23Mar2014); Last Rx:00Kuu1044 Ordered    3  Folic Acid 1 MG Oral Tablet; take 1 tablet by mouth once daily; Therapy: 96CSK1906 to (Evaluate:26Oct2016)  Requested for: 01DNO8491; Last   Rx:74Qsu8207 Ordered    4  Bisoprolol Fumarate 5 MG Oral Tablet; take 1 tablet by mouth once daily; Therapy: 40Ant5551 to ([facility])  Requested for: 33SPX9571; Last   Rx:01Jun2016 Ordered   5  Ramipril 10 MG Oral Capsule; take 1 capsule by mouth once daily; Therapy: 03Jci5022 to (Evaluate:26Sep2016)  Requested for: 81YJY2393; Last   Rx:98Naj2338 Ordered    6  Omeprazole 20 MG Oral Capsule Delayed Release; take 1 capsule by mouth twice a   day; Therapy: 69LRE6687 to (Evaluate:96Guu8459)  Requested for: 18XPA8537; Last   Rx:10Doo4153 Ordered    7  Tamsulosin HCl - 0 4 MG Oral Capsule; take 1 capsule by mouth once daily; Therapy: 35FQE1905 to (Evaluate:04Oct2016)  Requested for: 30RUJ3768; Last   Rx:08Mar2016 Ordered    8  Blood Glucose Monitor System w/Device Kit; TEST BS BID;    Therapy: 69IAE9002 to (Evaluate:02Jun2016)  Requested for: 35ZQB7789; Last   QQ:09AUA3853 Ordered 9  Blood Glucose Test In Vitro Strip; TEST BLOOD SUGARS BID; Therapy: 57ZYH7868 to (Evaluate:02Jun2016)  Requested for: 96JNC0464; Last   Rx:03May2016 Ordered   10  Lancets Miscellaneous; TEST BS BID; Therapy: 38AYX8937 to (Za[facility] Franklin)  Requested for: 58WXE9022; Last    Rx:03May2016 Ordered    11  Cortisporin-TC 3 3-3-10-0 5 MG/ML Otic Suspension; place 5 to 10 drops in right ear    daily; Therapy: 21XDV7519 to (Oneil Rodriguez)  Requested for: 42ZLP6605; Last    Rx:21Oct2014 Ordered    12  Atorvastatin Calcium 40 MG Oral Tablet; take 1 tablet by mouth once daily; Therapy: 15QGD7841 to (Evaluate:26Oct2016)  Requested for: 36JIM9664; Last    Rx:74Rkd4143 Ordered    13  Indapamide 1 25 MG Oral Tablet; take 1 tablet by mouth once daily 1 tablet by mouth    once daily; Therapy: 17Dds7173 to (Evaluate:59Paf9774)  Requested for: 61PZC9868; Last    Rx:58Dvp1414 Ordered    14  Viagra 50 MG Oral Tablet; take 1 tablet by mouth 1 hour BEFORE NEEDED; Therapy: 47FRU6139 to (Evaluate:20Mar2016)  Requested for: 39AKL0051; Last    Rx:92Wrg0615 Ordered    15  D 1000 1000 UNIT Oral Capsule; take 1 capsule daily; Therapy: 27HTD9740 to 686 0886)  Requested for: 16UJB2546; Last    Rx:19Jan2015 Ordered   16  Vitamin D3 1000 UNIT Oral Capsule; take 1 capsule daily; Therapy: 82XQV1663 to (Evaluate:28Lri1527)  Requested for: 65XLW1545; Last    Rx:02May2016 Ordered    17  Aspirin 81 MG TABS; TAKE 1 TABLET DAILY; Therapy: (Recorded:46Upq1427) to Recorded   18  Lysine HCl - 1000 MG Oral Tablet; TAKE AS DIRECTED; Therapy: (Recorded:76Ifw9912) to Recorded   19  Multivitamins TABS; TAKE 1 TABLET DAILY; Therapy: (Recorded:09Eii9218) to Recorded   20  Slow Magnesium/Calcium  MG Oral Tablet Delayed Release; TAKE TWO    TABLETS BY MOUTH DAILY;     Therapy: (Recorded:96Jgx6239) to Recorded    Future Appointments    Date/Time Provider Specialty Site   06/21/2016 09:30 AM Cameron Rapp RD Diabetes Educator [facility] DIABETES EDUCATION   06/14/2016 09:30 AM Steven Guerrier RD Diabetes Educator [facility] [address] [facility]ETES EDUC[facility]   06/28/2016 09:30 AM Steven Guerrier RD Diabetes Educator [facility] DIABETES EDUCATION   08/31/2016 01:30 PM KEYANA Bradley   Internal Medicine [facility]     Signatures   Electronically signed by : ERICA Brown; Jun 7 2016 12:50PM EST                       (Author)    Electronically signed by : KEYANA Diaz ; Jun 8 2016  2:21PM EST

## 2018-01-10 NOTE — MISCELLANEOUS
Message   Date: 14 Nov 2016 4:43 PM EST, Recorded By: Carmen Rajan   Calling For: JoseRosario oglesby   Caller: Mike Morgan   Phone: [phone] (Home), [phone] (Work)   c/o still has swelling and pain in hands and wrists  Now has pain across his shoulders  He is taking meloxicam 7 5 mg but would like something stronger  He was instructed to take meloxicam 7 5 mg 2 daily for 1 week  He will call 11 21 16 with an update  He has an appt with Dr Vero Angelo 11 26 16  Active Problems    1  Acute sinusitis (461 9) (J01 90)   2  Allergic rhinitis (477 9) (J30 9)   3  Anemia (285 9) (D64 9)   4  Aneurysm of abdominal aorta (441 4) (I71 4)   5  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,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)   6  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)   7  Benign prostatic hypertrophy (600 00) (N40 0)   8  Cataract (366 9) (H26 9)   9  Colonoscopy (Fiberoptic) Screening   10  Cough (786 2) (R05)   11  Depression screening (V79 0) (Z13 89)   12  Diabetes mellitus type II, controlled (250 00) (E11 9)   13  Ear discomfort (388 70) (H92 09)   14  Fecal occult blood test positive (792 1) (R19 5)   15  Folic acid deficiency anemia (281 2) (D52 9)   16  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)   17  Glaucoma screening (V80 1) (Z13 5)   18  Hyperlipidemia (272 4) (E78 5)   19  Hypertension (401 9) (I10)   20  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 31) (D69 3)   21  Impaired fasting glucose (790 21) (R73 01)   22  Iron deficiency anemia (280 9) (D50 9)   23  Need for pneumococcal vaccination (V03 82) (Z23)   24  Need for prophylactic vaccination and inoculation against influenza (V04 81) (Z23)   25  Numbness of leg (782 0) (R20 0)   26  Polyarthritis (716 50) (M13 0)   27  Screening for genitourinary condition (V81 6) (Z13 89)   28  Screening for neurological condition (V80 09) (Z13 89)   29  Sexual dysfunction (302 70) (R37)   30  Visit for pre-operative examination (V72 84) (Z01 818)   31   Vitamin D deficiency (268 9) (E55 9)    Current Meds   1  Aspirin 81 MG TABS; TAKE 1 TABLET DAILY; Therapy: (Recorded:78Yzd5264) to Recorded   2  Atorvastatin Calcium 40 MG Oral Tablet; take 1 tablet by mouth once daily; Therapy: 37XWO6920 to (Evaluate:26Oct2016)  Requested for: 88KAE3769; Last   Rx:55Npe1090 Ordered   3  Bisoprolol Fumarate 5 MG Oral Tablet; take 1 tablet by mouth once daily; Therapy: 89Jvd8305 to (Evaluate:04Oct2016)  Requested for: 15RNU9584; Last   Rx:38Mvo4588 Ordered   4  Cortisporin-TC 3 3-3-10-0 5 MG/ML SUSP; place 5 to 10 drops in right ear daily; Therapy: 64FLC5276 to ([facility] S[facility])  Requested for: 07BLT6384; Last   Rx:21Oct2014 Ordered   5  Ferrous Sulfate 325 (65 Fe) MG Oral Tablet; ONE TABLET MONDAY WEDNESDAY AND   FRIDAY WEEKLY DAILY WITH FOOD; Therapy: 78JCD1178 to (Evaluate:23Mar2014); Last Rx:65Uwp4378 Ordered   6  Fluticasone Propionate 50 MCG/ACT Nasal Suspension; USE 2 SPRAYS IN EACH   NOSTRIL DAILY; Therapy: 77SVP5579 to (Evaluate:20Jan2016)  Requested for: 81CFY6079; Last   ST:74AFB7107 Ordered   7  Folic Acid 1 MG Oral Tablet; take 1 tablet by mouth once daily; Therapy: 15OCV0653 to (Evaluate:26Oct2016)  Requested for: 34EVP2008; Last   Rx:23Ohc8858 Ordered   8  Indapamide 1 25 MG Oral Tablet; take 1 tablet by mouth once daily; Therapy: 18Bhz5709 to (Harriet Welsh)  Requested for: 29EGA4818; Last   Rx:11Oct2016 Ordered   9  Lancets Miscellaneous; TEST BS BID; Therapy: 86RBH5207 to (Jose Jovel)  Requested for: 63VMC7424; Last   Rx:96Gwb5707 Ordered   10  Lysine HCl - 1000 MG Oral Tablet; TAKE AS DIRECTED; Therapy: (Recorded:46Aue4715) to Recorded   11  Meloxicam 7 5 MG Oral Tablet; TAKE 1 TABLET DAILY AS NEEDED; Therapy: 30RGP7664 to (Last Bula Antes)  Requested for: 25Oct2016 Ordered   12  Multivitamins TABS; TAKE 1 TABLET DAILY; Therapy: (Recorded:17Sep2012) to Recorded   13   Omeprazole 40 MG Oral Capsule Delayed Release; TAKE ONE CAPSULE BY MOUTH    EVERY DAY; Therapy: 55OAJ7453 to (Evaluate:24Mar2017)  Requested for: 25Oct2016; Last    Rx:25Oct2016 Ordered   14  Ramipril 10 MG Oral Capsule; take 1 capsule by mouth once daily; Therapy: 88Tuq4493 to (Dile[facility])  Requested for: 38IZW5689; Last    Rx:11Oct2016 Ordered   15  Slow Magnesium/Calcium  MG TBEC; TAKE TWO TABLETS BY MOUTH DAILY; Therapy: (Recorded:27Sbt7373) to Recorded   16  Tamsulosin HCl - 0 4 MG Oral Capsule; take 1 capsule by mouth once daily; Therapy: 94WGP7217 to (Evaluate:04Oct2016)  Requested for: 40ZMN6595; Last    Rx:08Mar2016 Ordered   17  TrueTrack Test In Vitro Strip; TEST TWICE A DAY; Therapy: 43DWG4003 to (Evaluate:21Nge7028)  Requested for: 84LUW9727; Last    Rx:18Yrw2627 Ordered   18  Viagra 50 MG Oral Tablet; take 1 tablet by mouth 1 hour BEFORE NEEDED; Therapy: 21YLT3552 to (Evaluate:20Mar2016)  Requested for: 29XAD2799; Last    Rx:95Cnv3397 Ordered   19  Vitamin D3 1000 UNIT Oral Capsule; take 1 capsule on Monday, Wednesday and    Fridays; Therapy: 84KDG6406 to (Evaluate:25Jan2017)  Requested for: 27Oct2016 Recorded    Allergies    1   Bactrim TABS    Signatures   Electronically signed by : KEYANA Claros ; Nov 14 2016  6:44PM EST                       (Author)

## 2018-01-10 NOTE — PROGRESS NOTES
Plan    1  DSMT/MNT Time Record; Status:Complete;   Done: 77NXG0528 12:00AM    Discussion/Summary    PATIENT EDUCATION RECORD   Indication for Services: type 2 Diabetes Mellitus  He is ready to learn  He has no barriers to learning  Diabetes Disease Process:   He understands the pathophysiology of diabetes: Method: Instruction  Response: Verbalizes Understanding   Discussed patient's type of diabetes: Method: Instruction  Response: Verbalizes Understanding   Discussed diagnosis criteria: Method: Instruction  Response: Verbalizes Understanding   Discussed treatment goals: Method: Instruction  Response: Verbalizes Understanding   Discussed benefits of control: Method: Instruction  Response: Verbalizes Understanding   Discussed treatment options: Method: Instruction  Response: Verbalizes Understanding   Healthy Eating:   Discussed general nutrition topics: Method: Instruction  Response: Verbalizes Understanding   Being Active:   Stated the benefits of exercise: Method: Instruction  Response: Verbalizes Understanding   Discussed "exercise guidelines": Method: Instruction  Response: Verbalizes Understanding   His blood glucose targets are: Pre-meal target  , Post-meal target <140 and HS target 100-140  Monitoring:   Discussed target blood glucose ranges: Method: Instruction and Handout  Response: Verbalizes Understanding  Discussed target hemoglobin A1c: Method: Instruction  Response: Verbalizes Understanding  Discussed Finger stick testing: Method: Instruction and Demonstration  Response: Verbalizes Understanding and Returns Demonstration  Discussed proper stick techniques: Method: Instruction and Demonstration  Response: Verbalizes Understanding and Returns Demonstration  Discussed testing times: Method: Instruction  Response: Verbalizes Understanding  Discussed safe lancet disposal: Method: Instruction  Response: Verbalizes Understanding  Discussed meter : Method: Instruction  Response: Verbalizes Understanding  Discussed options for record keeping: Method: Instruction  Response: Verbalizes Understanding  Discussed reporting of readings to M D : Method: Instruction  Response: Verbalizes Understanding  He was instructed how to use the meter  He is currently using a True Track meter  He was given Fast 15 List   Problem Solving: He is able to state the symptoms, prevention, and treatment of hypoglycemia, but He is not on medications that cause hypoglycemia   Hypoglycemia: Stated definition and causes: Method: Instruction  Response: Verbalizes Understanding   Described signs and symptoms: Method: Instruction and Handout  Response: Verbalizes Understanding   Discussed prevention: Method: Instruction  Response: Verbalizes Instruction   Discussed treatment: Method: Instruction  Response: Verbalizes Instruction   Hyperglycemia: Stated definition and causes: Method: Instruction  Response: Verbalizes Understanding   Described signs and symptoms: Method: Instruction and Handout  Response: Verbalizes Instruction   Reducing Risk:   Discussed long term complications- prevention, assessment, and monitoring  Method: Instruction  Response: [employer]  Discussed yearly recommended exams  Method: Instruction  Response: [employer]  I recommend he see a Podiatrist yearly  an Eye Doctor yearly  Education Plan/Path:  He needs the Living Well Class  June at [facility]  He was given the following educational materials: Know Your Blood Glucose Numbers, The 15/15 Rule for Treating Low Blood Sugar and Hyperglycemia/Hypoglycemia   Chief Complaint  Patient with T2DM seen for class assessment per MD request       History of Present Illness  Patient will be attending Living Well with Diabetes classes in June at the [employer]        Results/Data  Encounter Results   DSMT/MNT Time Record [id number] 12:00AM Katia Mage     Test Name Result Flag Reference   Date of Service 5/23/2016 Start - Stop Time 3:00-4:00PM     Total MInutes 60 minutes     Group Or Individual Instruction DSMT-I       Future Appointments    Date/Time Provider Specialty Site   08/31/2016 01:30 PM KEYANA Garza   Internal Medicine [facility] [place]     Signatures   Electronically signed by : Neela Avery; May 24 2016  3:36PM EST                       (Author)    Electronically signed by : KEYANA Miranda ; May 24 2016  9:17PM EST                       (Author)

## 2018-01-11 NOTE — RESULT NOTES
Message   Recorded as Task   Date: 01/27/2016 05:23 PM, Created By: Cuate Sanchez   Task Name: Follow Up   Assigned To: Eda Seip   Regarding Patient: Hanna Ashton, Status: Active   CommentThais Solano - 27 Jan 2016 5:23 PM     TASK CREATED  Some mild peripheral neuropathy a mild L5 radiculopathy the plan is the same we'll see her in follow-up   Yandy Cutler - 28 Jan 2016 10:06 AM     TASK EDITED  Spoke to patient and gave results, pt understood

## 2018-01-11 NOTE — MISCELLANEOUS
Message   Date: 22 Nov 2016 9:45 AM EST, Recorded By: Azucena Hale For: Rosario Garcia   Caller: Ct Rayraymariah, Self   Phone: [phone] (Home), [phone] (Work)   Phone call from Ana Lilia Shira:  he has been using meloxicam 15mg for the last week for pain and swelling in his hands, wrists and shoulders  He states there is no difference in the pain  He will see Dr Bunch Rather within the next week  He was instructed to stop meloxicam and try Aleve 2 twice daily until his appt  with Dr Bunch Rather         Active Problems    1  Acute sinusitis (461 9) (J01 90)   2  Allergic rhinitis (477 9) (J30 9)   3  Anemia (285 9) (D64 9)   4  Aneurysm of abdominal aorta (441 4) (I71 4)   5  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,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)   6  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)   7  Benign prostatic hypertrophy (600 00) (N40 0)   8  Cataract (366 9) (H26 9)   9  Colonoscopy (Fiberoptic) Screening   10  Cough (786 2) (R05)   11  Depression screening (V79 0) (Z13 89)   12  Diabetes mellitus type II, controlled (250 00) (E11 9)   13  Ear discomfort (388 70) (H92 09)   14  Fecal occult blood test positive (792 1) (R19 5)   15  Folic acid deficiency anemia (281 2) (D52 9)   16  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)   17  Glaucoma screening (V80 1) (Z13 5)   18  Hyperlipidemia (272 4) (E78 5)   19  Hypertension (401 9) (I10)   20  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 31) (D69 3)   21  Impaired fasting glucose (790 21) (R73 01)   22  Iron deficiency anemia (280 9) (D50 9)   23  Need for pneumococcal vaccination (V03 82) (Z23)   24  Need for prophylactic vaccination and inoculation against influenza (V04 81) (Z23)   25  Numbness of leg (782 0) (R20 0)   26  Polyarthritis (716 50) (M13 0)   27  Screening for genitourinary condition (V81 6) (Z13 89)   28  Screening for neurological condition (V80 09) (Z13 89)   29  Sexual dysfunction (302 70) (R37)   30   Visit for pre-operative examination (G95 76) (Z01 818)   31  Vitamin D deficiency (268 9) (E55 9)    Current Meds   1  Aleve 220 MG Oral Tablet; TAKE 2 TABLETS TWICE DAILY; Therapy: 2016 to Recorded   2  Aspirin 81 MG TABS; TAKE 1 TABLET DAILY; Therapy: (Recorded:69Qvi8271) to Recorded   3  Atorvastatin Calcium 40 MG Oral Tablet; take 1 tablet by mouth once daily; Therapy: 27HBW8809 to (Evaluate:05Gem8172)  Requested for: 12RQV8379; Last   Rx:53Lxz0492 Ordered   4  Bisoprolol Fumarate 5 MG Oral Tablet; take 1 tablet by mouth once daily; Therapy: 12Kqa4498 to (Evaluate:2016)  Requested for: 91LJC5020; Last   Rx:56Nnl2841 Ordered   5  Cortisporin-TC 3 3-3-10-0 5 MG/ML SUSP; place 5 to 10 drops in right ear daily; Therapy: 55YGB1215 to (Bre Baldwin)  Requested for: 03SUS5298; Last   Rx:2014 Ordered   6  Ferrous Sulfate 325 (65 Fe) MG Oral Tablet; ONE TABLET  AND   FRIDAY WEEKLY DAILY WITH FOOD; Therapy: 53WWT8088 to (Evaluate:2014); Last Rx:84Ufv9314 Ordered   7  Fluticasone Propionate 50 MCG/ACT Nasal Suspension; USE 2 SPRAYS IN EACH   NOSTRIL DAILY; Therapy: 99IAK0706 to (Evaluate:2016)  Requested for: 89XKW1609; Last   T87SXG8860 Ordered   8  Folic Acid 1 MG Oral Tablet; take 1 tablet by mouth once daily; Therapy: 82IQN5705 to (Evaluate:60Dca6988)  Requested for: 86KGX3049; Last   Rx:48Scu5484 Ordered   9  Indapamide 1 25 MG Oral Tablet; take 1 tablet by mouth once daily; Therapy: 17Oxu3466 to (Kendra Hilario)  Requested for: 25VTY4327; Last   Rx:99Emr4511 Ordered   10  Lancets Miscellaneous; TEST BS BID; Therapy: 70NIJ5102 to (Aide Duran)  Requested for: 55OIL7613; Last    Rx:67Ddb4008 Ordered   11  Lysine HCl - 1000 MG Oral Tablet; TAKE AS DIRECTED; Therapy: (Recorded:32Vad3020) to Recorded   12  Meloxicam 7 5 MG Oral Tablet; TAKE 1 TABLET DAILY AS NEEDED; Therapy: 94DFI5056 to (Last Chloé Burch)  Requested for: 2016 Ordered   13   Multivitamins TABS; TAKE 1 TABLET DAILY; Therapy: (Recorded:32Sjm2355) to Recorded   14  Omeprazole 40 MG Oral Capsule Delayed Release; TAKE ONE CAPSULE BY MOUTH    EVERY DAY; Therapy: 91CSN5553 to (Evaluate:24Mar2017)  Requested for: 25Oct2016; Last    Rx:98Rko9969 Ordered   15  Ramipril 10 MG Oral Capsule; take 1 capsule by mouth once daily; Therapy: 55Owz3379 to (Jeremy Read)  Requested for: 81EZN1948; Last    Rx:11Oct2016 Ordered   16  Slow Magnesium/Calcium  MG TBEC; TAKE TWO TABLETS BY MOUTH DAILY; Therapy: (Recorded:95Sbw1946) to Recorded   17  Tamsulosin HCl - 0 4 MG Oral Capsule; take 1 capsule by mouth once daily; Therapy: 04HHG2778 to (Evaluate:04Oct2016)  Requested for: 38WQN1745; Last    Rx:08Mar2016 Ordered   18  TrueTrack Test In Vitro Strip; TEST TWICE A DAY; Therapy: 41VLC7017 to (Evaluate:72Qkq0370)  Requested for: 13VUN3540; Last    Rx:90Tuq0458 Ordered   19  Viagra 50 MG Oral Tablet; take 1 tablet by mouth 1 hour BEFORE NEEDED; Therapy: 99FUU4640 to (Evaluate:20Mar2016)  Requested for: 67RVW7606; Last    Rx:60Bej8905 Ordered   20  Vitamin D3 1000 UNIT Oral Capsule; take 1 capsule daily; Therapy: 15TXB4374 to (Evaluate:35Bhc2957)  Requested for: 21RDF6369; Last    Rx:98Yof4084 Ordered    Allergies    1   Bactrim TABS    Plan  Hypertension, Polyarthritis    · Meloxicam 7 5 MG Oral Tablet (Mobic)    Signatures   Electronically signed by : Freda Eisenmenger, M D ; Nov 22 2016 10:08AM EST                       (Author)

## 2018-01-12 VITALS
TEMPERATURE: 98.7 F | WEIGHT: 186 LBS | SYSTOLIC BLOOD PRESSURE: 140 MMHG | DIASTOLIC BLOOD PRESSURE: 76 MMHG | BODY MASS INDEX: 29.89 KG/M2 | HEART RATE: 78 BPM | HEIGHT: 66 IN | RESPIRATION RATE: 15 BRPM

## 2018-01-12 NOTE — PROGRESS NOTES
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,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)   6  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)   7  Benign prostatic hypertrophy (600 00) (N40 0)   8  Cataract (366 9) (H26 9)   9  Colonoscopy (Fiberoptic) Screening   10  Cough (786 2) (R05)   11  Depression screening (V79 0) (Z13 89)   12  Diabetes mellitus type II, controlled (250 00) (E11 9)   13  Ear discomfort (388 70) (H92 09)   14  Fecal occult blood test positive (792 1) (R19 5)   15  Folic acid deficiency anemia (281 2) (D52 9)   16  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)   17  Glaucoma screening (V80 1) (Z13 5)   18  Hyperlipidemia (272 4) (E78 5)   19  Hypertension (401 9) (I10)   20  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 31) (D69 3)   21  Impaired fasting glucose (790 21) (R73 01)   22  Iron deficiency anemia (280 9) (D50 9)   23  Need for pneumococcal vaccination (V03 82) (Z23)   24  Need for prophylactic vaccination and inoculation against influenza (V04 81) (Z23)   25  Numbness of leg (782 0) (R20 0)   26  Screening for genitourinary condition (V81 6) (Z13 89)   27  Screening for neurological condition (V80 09) (Z13 89)   28  Sexual dysfunction (302 70) (R37)   29  Visit for pre-operative examination (V72 84) (Z01 818)   30  Vitamin D deficiency (268 9) (E55 9)    Past Medical History    1  History of Acute Myocardial Infarction (V12 59)   2   History of peripheral vascular disease (V12 59) (Z86 79)    Surgical History    1  History of Cath Stent Placement    Social History    · Former smoker (V98 24) (N60 723)    Current Meds   1  Aspirin 81 MG TABS; TAKE 1 TABLET DAILY; Therapy: (Recorded:07Upu1814) to Recorded   2  Atorvastatin Calcium 40 MG Oral Tablet; take 1 tablet by mouth once daily; Therapy: 57SYC9341 to (Evaluate:26Oct2016)  Requested for: 89KTT2088; Last   Rx:36Oae8054 Ordered   3  Bisoprolol Fumarate 5 MG Oral Tablet; take 1 tablet by mouth once daily; Therapy: 13Ukt9715 to (Jose Bauer)  Requested for: 69CHO1462; Last   Rx:01Jun2016 Ordered   4  Blood Glucose Monitor System w/Device Kit; TEST BS BID; Therapy: 66ULH2896 to (Evaluate:02Jun2016)  Requested for: 46TVC2493; Last   Rx:21Rct1739 Ordered   5  Blood Glucose Test In Vitro Strip; TEST BLOOD SUGARS BID; Therapy: 45TZJ2380 to (Evaluate:02Jun2016)  Requested for: 61HNS9499; Last   Rx:98Xfs4249 Ordered   6  Cortisporin-TC 3 3-3-10-0 5 MG/ML SUSP; place 5 to 10 drops in right ear daily; Therapy: 72CSF2267 to (Paola Mercedes)  Requested for: 13LFS4282; Last   Rx:21Oct2014 Ordered   7  D 1000 1000 UNIT Oral Capsule; take 1 capsule daily; Therapy: 00RJO6737 to [phone])  Requested for: 60RTZ2594; Last   Rx:19Jan2015 Ordered   8  Ferrous Sulfate 325 (65 Fe) MG Oral Tablet; ONE TABLET MONDAY WEDNESDAY AND   FRIDAY WEEKLY DAILY WITH FOOD; Therapy: 86ELL1747 to (Evaluate:23Mar2014); Last Rx:74Miq8950 Ordered   9  Fluticasone Propionate 50 MCG/ACT Nasal Suspension; USE 2 SPRAYS IN EACH   NOSTRIL DAILY; Therapy: 25OXS8700 to (Evaluate:20Jan2016)  Requested for: 90ACQ7618; Last   IU:17WOZ7841 Ordered   10  Folic Acid 1 MG Oral Tablet; take 1 tablet by mouth once daily; Therapy: 52TTZ3926 to (Lakeisha Putt)  Requested for: 96UAS3125; Last    Rx:10Hgs4355 Ordered   11  Indapamide 1 25 MG Oral Tablet; take 1 tablet by mouth once daily 1 tablet by mouth    once daily;     Therapy: 97Gsi6693 to (Evaluate:47Zvx2605)  Requested for: 93DFR1000; Last    Rx:51Gsv9203 Ordered   12  Lancets Miscellaneous; TEST BS BID; Therapy: 23WMA7463 to (Soni Post)  Requested for: 26CGU4076; Last    Rx:11Xbd7012 Ordered   15  Lysine HCl - 1000 MG Oral Tablet; TAKE AS DIRECTED; Therapy: (Recorded:72Fkd5274) to Recorded   14  Multivitamins TABS; TAKE 1 TABLET DAILY; Therapy: (Recorded:35Xhv8907) to Recorded   15  Omeprazole 20 MG Oral Capsule Delayed Release; take 1 capsule by mouth twice a    day; Therapy: 03MVU7719 to (Evaluate:94Wmv9936)  Requested for: 06LLF3705; Last    Rx:67Vem9851 Ordered   16  Ramipril 10 MG Oral Capsule; take 1 capsule by mouth once daily; Therapy: 89Xbr9092 to (Evaluate:26Sep2016)  Requested for: 74ZYP5514; Last    Rx:72Yot9438 Ordered   17  Slow Magnesium/Calcium  MG Oral Tablet Delayed Release; TAKE TWO    TABLETS BY MOUTH DAILY; Therapy: (Recorded:48Fsp4394) to Recorded   18  Tamsulosin HCl - 0 4 MG Oral Capsule; take 1 capsule by mouth once daily; Therapy: 40UFE4609 to (Evaluate:04Oct2016)  Requested for: 01WTE4330; Last    Rx:08Mar2016 Ordered   19  Viagra 50 MG Oral Tablet; take 1 tablet by mouth 1 hour BEFORE NEEDED; Therapy: 13FOR9481 to (Evaluate:20Mar2016)  Requested for: 46DFS7762; Last    Rx:01Slh7881 Ordered   20  Vitamin D3 1000 UNIT Oral Capsule; take 1 capsule daily; Therapy: 47KBZ9478 to (Evaluate:15Qga6324)  Requested for: 46QJS2081; Last    PC:99UEN9053 Ordered    Allergies    1  Bactrim TABS    End of Encounter Meds    1  Fluticasone Propionate 50 MCG/ACT Nasal Suspension; USE 2 SPRAYS IN EACH   NOSTRIL DAILY; Therapy: 00FXH3960 to (Evaluate:20Jan2016)  Requested for: 71NXJ1775; Last   NF:03ION9564 Ordered    2  Ferrous Sulfate 325 (65 Fe) MG Oral Tablet; ONE TABLET MONDAY WEDNESDAY AND   FRIDAY WEEKLY DAILY WITH FOOD; Therapy: 62MHG2303 to (Evaluate:23Mar2014); Last Rx:99Ran3212 Ordered    3   Folic Acid 1 MG Oral Tablet; take 1 tablet by mouth once daily; Therapy: 96YGC7501 to (Evaluate:26Oct2016)  Requested for: 68NZF1532; Last   Rx:29Feb2016 Ordered    4  Bisoprolol Fumarate 5 MG Oral Tablet; take 1 tablet by mouth once daily; Therapy: 97Twx6059 to (Chantel Cloud)  Requested for: 89NVJ3083; Last   Rx:01Jun2016 Ordered   5  Ramipril 10 MG Oral Capsule; take 1 capsule by mouth once daily; Therapy: 71Dgq6760 to (Evaluate:26Sep2016)  Requested for: 88JEW3014; Last   Rx:29Feb2016 Ordered    6  Omeprazole 20 MG Oral Capsule Delayed Release; take 1 capsule by mouth twice a   day; Therapy: 39EZB0704 to (Evaluate:27Aug2016)  Requested for: 33ZBS9027; Last   Rx:29Feb2016 Ordered    7  Tamsulosin HCl - 0 4 MG Oral Capsule; take 1 capsule by mouth once daily; Therapy: 58ANI0309 to (Evaluate:04Oct2016)  Requested for: 33CKG3340; Last   Rx:08Mar2016 Ordered    8  Blood Glucose Monitor System w/Device Kit; TEST BS BID; Therapy: 74JJP8809 to (Evaluate:02Jun2016)  Requested for: 53DYY3959; Last   Rx:03May2016 Ordered   9  Blood Glucose Test In Vitro Strip; TEST BLOOD SUGARS BID; Therapy: 23WTV1442 to (Evaluate:02Jun2016)  Requested for: 11BRH2681; Last   Rx:03May2016 Ordered   10  Lancets Miscellaneous; TEST BS BID; Therapy: 82ZVD1868 to (Adebayo Chong)  Requested for: 73PSX1266; Last    Rx:03May2016 Ordered    11  Cortisporin-TC 3 3-3-10-0 5 MG/ML SUSP; place 5 to 10 drops in right ear daily; Therapy: 89LDA2803 to (Stephan Marte)  Requested for: 78XTU7489; Last    Rx:21Oct2014 Ordered    12  Atorvastatin Calcium 40 MG Oral Tablet; take 1 tablet by mouth once daily; Therapy: 09ZXH6387 to (Evaluate:26Oct2016)  Requested for: 54ICI3910; Last    Rx:29Feb2016 Ordered    13  Indapamide 1 25 MG Oral Tablet; take 1 tablet by mouth once daily 1 tablet by mouth    once daily; Therapy: 68Ttn1849 to (Evaluate:71Azu4272)  Requested for: 18ATB5106; Last    Rx:99Ouj0428 Ordered    14   Viagra 50 MG Oral Tablet; take 1 tablet by mouth 1 hour BEFORE NEEDED; Therapy: 90USB3692 to (Evaluate:20Mar2016)  Requested for: 95WEY4350; Last    Rx:24Owf0259 Ordered    15  D 1000 1000 UNIT Oral Capsule; take 1 capsule daily; Therapy: 69SCD5387 to [phone])  Requested for: 66GZD5601; Last    Rx:19Jan2015 Ordered   16  Vitamin D3 1000 UNIT Oral Capsule; take 1 capsule daily; Therapy: 84MBC7173 to (Evaluate:11Rrc6841)  Requested for: 14LXZ3182; Last    Rx:07Rno6923 Ordered    17  Aspirin 81 MG TABS; TAKE 1 TABLET DAILY; Therapy: (Recorded:92Mlk8017) to Recorded   18  Lysine HCl - 1000 MG Oral Tablet; TAKE AS DIRECTED; Therapy: (Recorded:67Fyz1001) to Recorded   19  Multivitamins TABS; TAKE 1 TABLET DAILY; Therapy: (Recorded:15Cmj9714) to Recorded   20  Slow Magnesium/Calcium  MG Oral Tablet Delayed Release; TAKE TWO    TABLETS BY MOUTH DAILY; Therapy: (Recorded:23Kpd6886) to Recorded    Future Appointments    Date/Time Provider Specialty Site   08/31/2016 01:30 PM KEYANA Ardon   Internal Medicine [facility]     Signatures   Electronically signed by : Ksenia Mondragon RD; Jun 28 2016  1:59PM EST                       (Author)    Electronically signed by : KEYANA Solorzano ; Jul 5 2016  1:25PM EST

## 2018-01-13 VITALS
RESPIRATION RATE: 15 BRPM | HEIGHT: 66 IN | HEART RATE: 60 BPM | WEIGHT: 189 LBS | DIASTOLIC BLOOD PRESSURE: 76 MMHG | SYSTOLIC BLOOD PRESSURE: 158 MMHG | BODY MASS INDEX: 30.37 KG/M2 | TEMPERATURE: 98 F

## 2018-01-13 NOTE — PROGRESS NOTES
Plan    1  DSMT/MNT Time Record; Status:Complete;   Done: 96CMY3308 09:30AM    Discussion/Summary    PATIENT EDUCATION RECORD   Living Well with Diabetes Class 3 Education Content:Oral/Injectable medication, Prevention, Short-term complications, Long-term complications, Foot care, Sick day management (illness and stress), Travel       Chief Complaint  Patient attended LWD class 3 this morning  Results/Data  Encounter Results   DSMT/MNT Time Record 2016 09:30AM Zi Jacques     Test Name Result Flag Reference   Date of Service 16     Start - Stop Time 9:30-11:30 am     Total MInutes 120     Group Or Individual Instruction DSMT-G       End of Encounter Meds    1  Fluticasone Propionate 50 MCG/ACT Nasal Suspension; USE 2 SPRAYS IN EACH   NOSTRIL DAILY; Therapy: 36TXD5601 to (Evaluate:2016)  Requested for: 94QXZ0234; Last   B74VHA4703 Ordered    2  Ferrous Sulfate 325 (65 Fe) MG Oral Tablet; ONE TABLET  AND   FRIDAY WEEKLY DAILY WITH FOOD; Therapy: 10SJB5689 to (Evaluate:2014); Last Rx:93Faj7208 Ordered    3  Folic Acid 1 MG Oral Tablet; take 1 tablet by mouth once daily; Therapy: 89UEI3701 to (Evaluate:2016)  Requested for: 28SPB3331; Last   Rx:37Xto3628 Ordered    4  Bisoprolol Fumarate 5 MG Oral Tablet; take 1 tablet by mouth once daily; Therapy: 72Asi2705 to (Bin Gomes)  Requested for: 44GND0741; Last   Rx:2016 Ordered   5  Ramipril 10 MG Oral Capsule; take 1 capsule by mouth once daily; Therapy: 45Aaj6256 to (Evaluate:50Vqp4766)  Requested for: 05HOS0436; Last   Rx:00Apx4937 Ordered    6  Omeprazole 20 MG Oral Capsule Delayed Release; take 1 capsule by mouth twice a   day; Therapy: 78VQO3913 to (Evaluate:24Nek3698)  Requested for: 07IVP8752; Last   Rx:17Jsx1074 Ordered    7  Tamsulosin HCl - 0 4 MG Oral Capsule; take 1 capsule by mouth once daily;    Therapy: 82WLU9879 to (Konrad Rollins)  Requested for: 23BYC5347; Last   Rx:2016 Ordered    8  Blood Glucose Monitor System w/Device Kit; TEST BS BID; Therapy: 56RLK4674 to (Evaluate:02Jun2016)  Requested for: 42UNL6204; Last   Rx:03May2016 Ordered   9  Blood Glucose Test In Vitro Strip; TEST BLOOD SUGARS BID; Therapy: 19QNM0465 to (Evaluate:02Jun2016)  Requested for: 56PEZ0100; Last   Rx:03May2016 Ordered   10  Lancets Miscellaneous; TEST BS BID; Therapy: 25RYJ0413 to (Esthela Yanez)  Requested for: 46MZM0319; Last    Rx:03May2016 Ordered    11  Cortisporin-TC 3 3-3-10-0 5 MG/ML SUSP; place 5 to 10 drops in right ear daily; Therapy: 38PQX6617 to (Zenaida Claros)  Requested for: 38JMR0052; Last    Rx:21Oct2014 Ordered    12  Atorvastatin Calcium 40 MG Oral Tablet; take 1 tablet by mouth once daily; Therapy: 85WBP4453 to (Evaluate:26Oct2016)  Requested for: 75QNQ2009; Last    Rx:60Ukq1327 Ordered    13  Indapamide 1 25 MG Oral Tablet; take 1 tablet by mouth once daily 1 tablet by mouth    once daily; Therapy: 54Pnc2063 to (Evaluate:26Sep2016)  Requested for: 09AAK6255; Last    Rx:29Feb2016 Ordered    14  Viagra 50 MG Oral Tablet; take 1 tablet by mouth 1 hour BEFORE NEEDED; Therapy: 57XZE7866 to (Evaluate:20Mar2016)  Requested for: 25GXN3200; Last    Rx:80Qaf0728 Ordered    15  D 1000 1000 UNIT Oral Capsule; take 1 capsule daily; Therapy: 38QWC9591 to [phone])  Requested for: 90FXG3264; Last    Rx:19Jan2015 Ordered   16  Vitamin D3 1000 UNIT Oral Capsule; take 1 capsule daily; Therapy: 42GHR5778 to (Evaluate:47Dxt1727)  Requested for: 23ORT4781; Last    Rx:02May2016 Ordered    17  Aspirin 81 MG TABS; TAKE 1 TABLET DAILY; Therapy: (Recorded:56Fng1407) to Recorded   18  Lysine HCl - 1000 MG Oral Tablet; TAKE AS DIRECTED; Therapy: (Recorded:99Uoj1038) to Recorded   19  Multivitamins TABS; TAKE 1 TABLET DAILY; Therapy: (Recorded:17Sep2012) to Recorded   20   Slow Magnesium/Calcium  MG Oral Tablet Delayed Release; TAKE TWO    TABLETS BY MOUTH DAILY; Therapy: (Recorded:47Pky5265) to Recorded    Future Appointments    Date/Time Provider Specialty Site   06/28/2016 09:30 AM Trinh Perla RD Diabetes Educator [facility] DIABETES EDUCATION   08/31/2016 01:30 PM KEYANA Calhoun   Internal Medicine [address] [facility]     Signatures   Electronically signed by : Fernie Yoo RD; Jun 22 2016 10:16AM EST                       (Author)    Electronically signed by : Fernie Yoo RD; Jun 22 2016 10:21AM EST                       (Author)    Electronically signed by : KEYANA Simon ; Jun 22 2016  2:11PM EST

## 2018-01-13 NOTE — RESULT NOTES
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

## 2018-01-16 NOTE — RESULT NOTES
Message   Recorded as Task   Date: 10/27/2016 06:46 AM, Created By: Allie Linder   Task Name: Follow Up   Assigned To: Chepe Yates   Regarding Patient: Armida Morejon, Status: Active   CommentFairy Bharath - 27 Oct 2016 6:46 AM     TASK CREATED  Lab review  Vitamin D level is in the 70s  find out exactly how much vitamin D he is taking  A piece taking 1000 units per day fell onto carted down to Monday Wednesday and Friday weekly  He's taking more than 1000 and day carted down to 1000 a day  We'll repeat the vitamin D level and basic metabolic panel in 3 months  All the other lab work are excellent   Lindsey Maguire - 27 Oct 2016 9:08 AM     TASK EDITED             Spoke to patient and gave results  He looked at his Vitamin D bottle and confirmed Vitamin D3 1,000 units one daily  I instructed the patient to decrease this to one tablet on Mon, Wed, and Fridays  Pt understood      We will give the lab slip for the Vitamin D at his appt in November to recheck in 3 months

## 2018-01-16 NOTE — MISCELLANEOUS
Message  Rec call from Elizabeth Sanchez of Dr Luis Enrique Hanson office to have a B/L temporal artery biopsy done asap  She scheduled the duplex with Sonu Jennings for 5 pm tonight at the [address]  [facility]  I spoke w/Yeimi and she was able to put it on for 2:45pm at the [facility] for Dr Christie Eastman to do after her EVLT's in the [facility]  I have called the patient and LMOM with this information  I have also faxed this back to Elizabeth Sanchez  Active Problems    1  Acute sinusitis (461 9) (J01 90)   2  Allergic rhinitis (477 9) (J30 9)   3  Anemia (285 9) (D64 9)   4  Aneurysm of abdominal aorta (441 4) (I71 4)   5  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,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)   6  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)   7  Benign prostatic hypertrophy (600 00) (N40 0)   8  Cataract (366 9) (H26 9)   9  Colonoscopy (Fiberoptic) Screening   10  Cough (786 2) (R05)   11  Depression screening (V79 0) (Z13 89)   12  Diabetes mellitus type II, controlled (250 00) (E11 9)   13  Ear discomfort (388 70) (H92 09)   14  Fecal occult blood test positive (792 1) (R19 5)   15  Folic acid deficiency anemia (281 2) (D52 9)   16  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)   17  Glaucoma screening (V80 1) (Z13 5)   18  Hyperlipidemia (272 4) (E78 5)   19  Hypertension (401 9) (I10)   20  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 31) (D69 3)   21  Impaired fasting glucose (790 21) (R73 01)   22  Iron deficiency anemia (280 9) (D50 9)   23  Need for pneumococcal vaccination (V03 82) (Z23)   24  Need for prophylactic vaccination and inoculation against influenza (V04 81) (Z23)   25  Numbness of leg (782 0) (R20 0)   26  Polyarthritis (716 50) (M13 0)   27  Polymyalgia rheumatica (725) (M35 3)   28  Screening for genitourinary condition (V81 6) (Z13 89)   29  Screening for neurological condition (V80 09) (Z13 89)   30  Sexual dysfunction (302 70) (R37)   31  Visit for pre-operative examination (V72 84) (Z01 818)   32  Vitamin D deficiency (268 9) (E55 9)    Current Meds   1  Aleve 220 MG Oral Tablet; TAKE 2 TABLETS TWICE DAILY; Therapy: 22Nov2016 to Recorded   2  Aspirin 81 MG TABS; TAKE 1 TABLET DAILY; Therapy: (Recorded:04Sgk1059) to Recorded   3  Atorvastatin Calcium 40 MG Oral Tablet; take 1 tablet by mouth once daily; Therapy: 81SGE7886 to (Evaluate:14Mar2018)  Requested for: 87OFG4774; Last   Rx:77Edo7118 Ordered   4  Bisoprolol Fumarate 5 MG Oral Tablet; take 1 tablet by mouth once daily; Therapy: 46Ixt4102 to (Evaluate:13Gai7513)  Requested for: 54Lac4100; Last   Rx:17Aom5822 Ordered   5  Cortisporin-TC 3 3-3-10-0 5 MG/ML SUSP; place 5 to 10 drops in right ear daily; Therapy: 32OCG2903 to (Enrike Perry)  Requested for: 65POU5911; Last   Rx:18Riy4431 Ordered   6  Ferrous Sulfate 325 (65 Fe) MG Oral Tablet; ONE TABLET MONDAY WEDNESDAY AND   FRIDAY WEEKLY DAILY WITH FOOD; Therapy: 23JYB0527 to (Evaluate:23Mar2014); Last Rx:91Zpq5643 Ordered   7  Fluticasone Propionate 50 MCG/ACT Nasal Suspension; USE 2 SPRAYS IN EACH   NOSTRIL DAILY; Therapy: 13IDV9544 to (Evaluate:20Jan2016)  Requested for: 23GRR9405; Last   DV:61FTL4102 Ordered   8  Folic Acid 1 MG Oral Tablet; take 1 tablet by mouth once daily; Therapy: 59UFJ5164 to (Evaluate:14Mar2018)  Requested for: 15WMM1008; Last   Rx:97Aux2087 Ordered   9  Indapamide 1 25 MG Oral Tablet; take 1 tablet by mouth once daily; Therapy: 97Lml0747 to (Evaluate:06Ocy8943)  Requested for: 22LWA6913; Last   Rx:43Sdu4302 Ordered   10  Lancets Miscellaneous; TEST BS BID; Therapy: 93XWM9364 to (Hola Timmons)  Requested for: 60TRB9472; Last    Rx:31Xhs1787 Ordered   11  Lysine HCl - 1000 MG Oral Tablet; TAKE AS DIRECTED; Therapy: (Recorded:83Qwo8675) to Recorded   12  Multivitamins TABS; TAKE 1 TABLET DAILY; Therapy: (Recorded:64Vtn8885) to Recorded   13  Omeprazole 40 MG Oral Capsule Delayed Release; TAKE ONE CAPSULE BY MOUTH    EVERY DAY;     Therapy: 81PTG7484 to (Evaluate:23Nfh5715)  Requested for: 31 75 62; Last    Rx:15Oct2017 Ordered   14  PredniSONE 5 MG Oral Tablet; Dr Juan Alberto Gill;    Therapy: 32DJR1384 to Recorded   15  Ramipril 10 MG Oral Capsule; take 1 capsule by mouth once daily; Therapy: 27Nxg3489 to (Evaluate:51Rbp5138)  Requested for: 30VPB1345; Last    Rx:49Vur2152 Ordered   16  Slow Magnesium/Calcium  MG TBEC; TAKE TWO TABLETS BY MOUTH DAILY; Therapy: (Recorded:47Uei1206) to Recorded   17  Tamsulosin HCl - 0 4 MG Oral Capsule; take 1 capsule by mouth once daily; Therapy: 38LVH6491 to (Evaluate:12Sbk2718)  Requested for: 80PRG0843; Last    Rx:17Oct2017 Ordered   18  TrueTrack Test In Vitro Strip; TEST TWICE A DAY; Therapy: 18ABG2507 to (Evaluate:53Gxz5968)  Requested for: 26LTJ4037; Last    Rx:94Zqk7218 Ordered   19  Viagra 50 MG Oral Tablet; take 1 tablet daily 1 hour before needed; Therapy: 65IVP4787 to (Evaluate:29Jan2017)  Requested for: 16VBN4018; Last    Rx:09Jan2017 Ordered   20  Vitamin D3 1000 UNIT Oral Capsule; take 1 capsule  Monday Wednesday and Friday    weekly; Therapy: 92JXA7199 to (Evaluate:27Jun2017)  Requested for: 29Mar2017; Last    Rx:29Mar2017 Ordered    Allergies    1   Bactrim TABS    Signatures   Electronically signed by : Edita Kelly, ; Oct 19 2017 11:07AM EST                       (Author)

## 2018-01-17 NOTE — RESULT NOTES
Message   Recorded as Task   Date: 11/14/2017 10:01 PM, Created By: Zaid Cervantes   Task Name: Follow Up   Assigned To: Tan Viveros   Regarding Patient: Saintclair Gal, Status: Active   CommentVenice Krystle - 14 Nov 2017 10:01 PM     TASK CREATED  distant  Hx  ITP  pltt [id number]    repeat  prior  OV   1--2  days  prior  save!!! Adalberto Hancock - 17 Nov 2017 3:47 PM     TASK EDITED  Spoke to patient and gave results    I will mail the lab slip for him to recheck prior to his upcoming appt

## 2018-01-23 VITALS
RESPIRATION RATE: 15 BRPM | HEIGHT: 66 IN | SYSTOLIC BLOOD PRESSURE: 169 MMHG | TEMPERATURE: 98.1 F | BODY MASS INDEX: 30.23 KG/M2 | DIASTOLIC BLOOD PRESSURE: 73 MMHG | HEART RATE: 69 BPM | WEIGHT: 188.13 LBS

## 2018-02-15 LAB
ALBUMIN/CREAT UR: 702.4 MG/G CREAT (ref 0–30)
APPEARANCE UR: CLEAR
BACTERIA URNS QL MICRO: NORMAL
BASOPHILS # BLD AUTO: 0.1 X10E3/UL (ref 0–0.2)
BASOPHILS NFR BLD AUTO: 1 %
BILIRUB UR QL STRIP: NEGATIVE
BUN SERPL-MCNC: 19 MG/DL (ref 8–27)
BUN/CREAT SERPL: 18 (ref 10–24)
CALCIUM SERPL-MCNC: 9.5 MG/DL (ref 8.6–10.2)
CHLORIDE SERPL-SCNC: 98 MMOL/L (ref 96–106)
CO2 SERPL-SCNC: 27 MMOL/L (ref 18–29)
COLOR UR: YELLOW
CREAT SERPL-MCNC: 1.04 MG/DL (ref 0.76–1.27)
CREAT UR-MCNC: 93 MG/DL
EOSINOPHIL # BLD AUTO: 0.2 X10E3/UL (ref 0–0.4)
EOSINOPHIL NFR BLD AUTO: 4 %
EPI CELLS #/AREA URNS HPF: NORMAL /HPF
ERYTHROCYTE [DISTWIDTH] IN BLOOD BY AUTOMATED COUNT: 15 % (ref 12.3–15.4)
GLUCOSE SERPL-MCNC: 107 MG/DL (ref 65–99)
GLUCOSE UR QL: NEGATIVE
HCT VFR BLD AUTO: 37.4 % (ref 37.5–51)
HGB BLD-MCNC: 12.6 G/DL (ref 13–17.7)
HGB UR QL STRIP: NEGATIVE
IMM GRANULOCYTES # BLD: 0 X10E3/UL (ref 0–0.1)
IMM GRANULOCYTES NFR BLD: 0 %
KETONES UR QL STRIP: NEGATIVE
LEUKOCYTE ESTERASE UR QL STRIP: NEGATIVE
LYMPHOCYTES # BLD AUTO: 1.4 X10E3/UL (ref 0.7–3.1)
LYMPHOCYTES NFR BLD AUTO: 24 %
MAGNESIUM SERPL-MCNC: 1.7 MG/DL (ref 1.6–2.3)
MCH RBC QN AUTO: 30.6 PG (ref 26.6–33)
MCHC RBC AUTO-ENTMCNC: 33.7 G/DL (ref 31.5–35.7)
MCV RBC AUTO: 91 FL (ref 79–97)
MICRO URNS: ABNORMAL
MICROALBUMIN UR-MCNC: 653.2 UG/ML
MONOCYTES # BLD AUTO: 0.6 X10E3/UL (ref 0.1–0.9)
MONOCYTES NFR BLD AUTO: 10 %
MUCOUS THREADS URNS QL MICRO: PRESENT
NEUTROPHILS # BLD AUTO: 3.6 X10E3/UL (ref 1.4–7)
NEUTROPHILS NFR BLD AUTO: 61 %
NITRITE UR QL STRIP: NEGATIVE
PH UR STRIP: 5.5 [PH] (ref 5–7.5)
PLATELET # BLD AUTO: 189 X10E3/UL (ref 150–379)
POTASSIUM SERPL-SCNC: 4.3 MMOL/L (ref 3.5–5.2)
PROT UR QL STRIP: ABNORMAL
RBC # BLD AUTO: 4.12 X10E6/UL (ref 4.14–5.8)
RBC #/AREA URNS HPF: NORMAL /HPF
SL AMB EGFR AFRICAN AMERICAN: 77 ML/MIN/1.73
SL AMB EGFR NON AFRICAN AMERICAN: 67 ML/MIN/1.73
SODIUM SERPL-SCNC: 140 MMOL/L (ref 134–144)
SP GR UR: 1.02 (ref 1–1.03)
UROBILINOGEN UR STRIP-ACNC: 0.2 EU/DL (ref 0.2–1)
WBC # BLD AUTO: 5.9 X10E3/UL (ref 3.4–10.8)
WBC #/AREA URNS HPF: NORMAL /HPF

## 2018-02-21 ENCOUNTER — OFFICE VISIT (OUTPATIENT)
Dept: INTERNAL MEDICINE CLINIC | Facility: CLINIC | Age: 83
End: 2018-02-21
Payer: COMMERCIAL

## 2018-02-21 VITALS
DIASTOLIC BLOOD PRESSURE: 70 MMHG | BODY MASS INDEX: 30.63 KG/M2 | TEMPERATURE: 97.6 F | HEIGHT: 66 IN | HEART RATE: 62 BPM | WEIGHT: 190.6 LBS | RESPIRATION RATE: 15 BRPM | SYSTOLIC BLOOD PRESSURE: 120 MMHG

## 2018-02-21 DIAGNOSIS — I10 ESSENTIAL HYPERTENSION: ICD-10-CM

## 2018-02-21 DIAGNOSIS — I73.9 PERIPHERAL VASCULAR DISEASE (HCC): ICD-10-CM

## 2018-02-21 DIAGNOSIS — E11.9 TYPE 2 DIABETES MELLITUS WITHOUT COMPLICATION, WITHOUT LONG-TERM CURRENT USE OF INSULIN (HCC): Primary | ICD-10-CM

## 2018-02-21 DIAGNOSIS — Z23 NEED FOR PNEUMOCOCCAL VACCINATION: ICD-10-CM

## 2018-02-21 DIAGNOSIS — Z86.79 HISTORY OF ABDOMINAL AORTIC ANEURYSM: ICD-10-CM

## 2018-02-21 LAB
SL AMB POCT GLUCOSE BLD: 111
SL AMB POCT HEMOGLOBIN AIC: 5.4

## 2018-02-21 PROCEDURE — 99214 OFFICE O/P EST MOD 30 MIN: CPT | Performed by: INTERNAL MEDICINE

## 2018-02-21 PROCEDURE — G0009 ADMIN PNEUMOCOCCAL VACCINE: HCPCS

## 2018-02-21 PROCEDURE — 90732 PPSV23 VACC 2 YRS+ SUBQ/IM: CPT

## 2018-02-21 PROCEDURE — 83036 HEMOGLOBIN GLYCOSYLATED A1C: CPT | Performed by: INTERNAL MEDICINE

## 2018-02-21 PROCEDURE — 82948 REAGENT STRIP/BLOOD GLUCOSE: CPT | Performed by: INTERNAL MEDICINE

## 2018-02-21 PROCEDURE — 3074F SYST BP LT 130 MM HG: CPT | Performed by: INTERNAL MEDICINE

## 2018-02-21 PROCEDURE — 3078F DIAST BP <80 MM HG: CPT | Performed by: INTERNAL MEDICINE

## 2018-02-21 RX ORDER — BISOPROLOL FUMARATE 5 MG/1
1 TABLET ORAL DAILY
COMMUNITY
Start: 2011-08-22 | End: 2018-04-14 | Stop reason: SDUPTHER

## 2018-02-21 RX ORDER — FOLIC ACID 1 MG/1
1 TABLET ORAL DAILY
COMMUNITY
Start: 2011-07-26 | End: 2018-03-18 | Stop reason: SDUPTHER

## 2018-02-21 RX ORDER — INDAPAMIDE 2.5 MG/1
1 TABLET, FILM COATED ORAL DAILY
COMMUNITY
Start: 2011-08-22 | End: 2018-04-14 | Stop reason: SDUPTHER

## 2018-02-21 RX ORDER — RAMIPRIL 10 MG/1
1 CAPSULE ORAL DAILY
COMMUNITY
Start: 2011-08-22 | End: 2018-07-23 | Stop reason: SDUPTHER

## 2018-02-21 RX ORDER — ATORVASTATIN CALCIUM 40 MG/1
1 TABLET, FILM COATED ORAL DAILY
COMMUNITY
Start: 2012-02-24 | End: 2018-03-18 | Stop reason: SDUPTHER

## 2018-02-21 RX ORDER — TAMSULOSIN HYDROCHLORIDE 0.4 MG/1
1 CAPSULE ORAL DAILY
COMMUNITY
Start: 2011-05-31 | End: 2018-05-21 | Stop reason: SDUPTHER

## 2018-02-21 RX ORDER — PREDNISONE 1 MG/1
4 TABLET ORAL DAILY
Refills: 0 | COMMUNITY
Start: 2018-01-25 | End: 2019-03-27

## 2018-02-21 RX ORDER — MULTIVITAMIN/IRON/FOLIC ACID 18MG-0.4MG
1 TABLET ORAL DAILY
COMMUNITY

## 2018-02-21 RX ORDER — OMEPRAZOLE 40 MG/1
1 CAPSULE, DELAYED RELEASE ORAL DAILY
COMMUNITY
Start: 2012-10-01 | End: 2018-03-18 | Stop reason: SDUPTHER

## 2018-02-21 RX ORDER — FERROUS SULFATE 325(65) MG
TABLET ORAL
COMMUNITY
Start: 2013-10-10 | End: 2018-12-27 | Stop reason: SDUPTHER

## 2018-02-21 RX ORDER — SILDENAFIL CITRATE 50 MG
50 TABLET ORAL AS NEEDED
Refills: 0 | COMMUNITY
Start: 2017-11-21 | End: 2019-07-02 | Stop reason: HOSPADM

## 2018-02-21 RX ORDER — BIOTIN 1 MG
TABLET ORAL
COMMUNITY
Start: 2014-08-18 | End: 2018-07-23 | Stop reason: SDUPTHER

## 2018-02-21 NOTE — PROGRESS NOTES
Assessment/Plan:    Type 2 diabetes mellitus without complication ([place] [address] )   Will check an A1c today  Essential hypertension    At goal with blood p specially with a microalbumin  Peripheral vascular disease ([place] [address] )    Being followed by vascular diminished pedal pulses I did a foot examination today he also had a history of abdominal aortic aneurysm that was resected stent         Problem List Items Addressed This Visit     Type 2 diabetes mellitus without complication ([place] [address] ) - Primary      Will check an A1c today  Relevant Orders    POCT hemoglobin A1c    Hemoglobin A1c    Lipid panel    Microalbumin / creatinine urine ratio    Magnesium    CBC and differential    Comprehensive metabolic panel    Lipid Panel with Direct LDL reflex    Peripheral vascular disease ([place] [address] )       Being followed by vascular diminished pedal pulses I did a foot examination today he also had a history of abdominal aortic aneurysm that was resected stent         Relevant Orders    POCT hemoglobin A1c    Hemoglobin A1c    Lipid panel    Microalbumin / creatinine urine ratio    Magnesium    CBC and differential    Comprehensive metabolic panel    Lipid Panel with Direct LDL reflex    History of abdominal aortic aneurysm    Essential hypertension       At goal with blood p specially with a microalbumin  Relevant Medications    ramipril (ALTACE) 10 MG capsule    bisoprolol (ZEBETA) 5 mg tablet    indapamide (LOZOL) 2 5 mg tablet    Other Relevant Orders    POCT hemoglobin A1c    Hemoglobin A1c    Lipid panel    Microalbumin / creatinine urine ratio    Magnesium    CBC and differential    Comprehensive metabolic panel    Lipid Panel with Direct LDL reflex            Subjective:      Patient ID: Crissy Ibrahim is a 81 y o  male      Diabetes type 2 will check an A1c before he leaves today needing had 1 when we did the routine lab work done     Blood pressure control    Microscopic micro albuminuria is around 700 and increased about 2-300 from last year will continue on ramipril if that continues to go up will increase the ramipril from 10-20 mg per day his blood pressure is in excellent control at 0 1 to change anything    Coronary artery sees stable no angina  Hyperlipidemia he is on a statin  History of abdominal aortic aneurysm that was repaired with a stent he continues follow-up with vascular surgeon  For health maintenance will give her them the pneumococcal 20  3 vaccine today        The following portions of the patient's history were reviewed and updated as appropriate: allergies, current medications, past family history, past medical history, past social history, past surgical history and problem list     Review of Systems   Constitutional: Negative  Negative for activity change, appetite change, fatigue, fever and unexpected weight change  HENT: Negative for congestion, ear pain, hearing loss, mouth sores, postnasal drip, rhinorrhea, sore throat, trouble swallowing and voice change  Eyes: Negative for pain, redness and visual disturbance  Respiratory: Negative for cough, chest tightness, shortness of breath and wheezing  Cardiovascular: Negative for chest pain, palpitations and leg swelling  Gastrointestinal: Negative for abdominal distention, abdominal pain, blood in stool, constipation, diarrhea and nausea  Endocrine: Negative for cold intolerance, heat intolerance, polydipsia, polyphagia and polyuria  Genitourinary: Negative for difficulty urinating, dysuria, flank pain, frequency, hematuria and urgency  Musculoskeletal: Negative for arthralgias, back pain, gait problem, joint swelling and myalgias  Skin: Negative for color change and pallor  Neurological: Negative for dizziness, tremors, seizures, syncope, weakness, numbness and headaches  Hematological: Negative for adenopathy  Does not bruise/bleed easily  Psychiatric/Behavioral: Negative  Negative for sleep disturbance   The patient is not nervous/anxious  Objective:     Physical Exam   Constitutional: He is oriented to person, place, and time  He appears well-developed and well-nourished  HENT:   Head: Normocephalic  Right Ear: External ear normal    Left Ear: External ear normal    Nose: Nose normal    Mouth/Throat: Oropharynx is clear and moist  No oropharyngeal exudate  Eyes: Conjunctivae and EOM are normal  Pupils are equal, round, and reactive to light  Neck: Normal range of motion  Neck supple  No thyromegaly present  Cardiovascular: Normal rate, regular rhythm, normal heart sounds and intact distal pulses  Exam reveals no gallop and no friction rub  Pulses are no weak pulses  No murmur heard  Pulses:       Dorsalis pedis pulses are 1+ on the right side, and 1+ on the left side  130/80  Pulmonary/Chest: Effort normal and breath sounds normal  No respiratory distress  He has no wheezes  He has no rales  Abdominal: Soft  Bowel sounds are normal  He exhibits no distension and no mass  There is no tenderness  There is no rebound and no guarding  Musculoskeletal: Normal range of motion  Feet:    Feet:   Right Foot:   Skin Integrity: Negative for ulcer, skin breakdown, erythema, warmth, callus or dry skin  Left Foot:   Skin Integrity: Negative for ulcer, skin breakdown, erythema, warmth, callus or dry skin  Lymphadenopathy:     He has no cervical adenopathy  Neurological: He is alert and oriented to person, place, and time  Skin: Skin is warm and dry  Psychiatric: He has a normal mood and affect  His behavior is normal  Judgment normal          Patient's shoes and socks removed  Right Foot/Ankle   Right Foot Inspection  Skin Exam: skin normal skin not intact, no dry skin, no warmth, no callus, no erythema, no maceration, no abnormal color, no pre-ulcer, no ulcer and no callus                          Toe Exam: ROM and strength within normal limitsno swelling, no tenderness, erythema and  no right toe deformity  Sensory   Vibration: intact  Proprioception: intact   Monofilament testing: intact  Vascular    The right DP pulse is 1+  Left Foot/Ankle  Left Foot Inspection  Skin Exam: skin normalskin not intact, no dry skin, no warmth, no erythema, no maceration, normal color, no pre-ulcer, no ulcer and no callus                         Toe Exam: ROM and strength within normal limitsno swelling, no tenderness, no erythema and no left toe deformity                   Sensory   Vibration: intact  Proprioception: intact  Monofilament: intact  Vascular    The left DP pulse is 1+  Assign Risk Category:  No deformity present; No loss of protective sensation;  No weak pulses       Risk: 1

## 2018-02-21 NOTE — PATIENT INSTRUCTIONS
At the present time blood pressure is control will check an hemoglobin A1c is follows up with vascular will going to given and pneumococcal vaccine 20  3 for his health maintenance I will see him back in 4 monthsDASH Eating Plan   WHAT YOU NEED TO KNOW:   The DASH (Dietary Approaches to Stop Hypertension) Eating Plan is designed to help prevent or lower high blood pressure  It can also help to lower LDL (bad) cholesterol and decrease your risk of heart disease  The plan is low in sodium, sugar, unhealthy fats, and total fat  It is high in potassium, calcium, magnesium, and fiber  These nutrients are added when you eat more fruits, vegetables, and whole grains  DISCHARGE INSTRUCTIONS:   Your sodium limit each day: Your dietitian will tell you how much sodium is safe for you to have each day  People with high blood pressure should have no more than 1,500 to 2,300 mg of sodium in a day  A teaspoon (tsp) of salt has 2,300 mg of sodium  This may seem like a difficult goal, but small changes to the foods you eat can make a big difference  Your healthcare provider or dietitian can help you create a meal plan that follows your sodium limit  How to limit sodium:   · Read food labels  Food labels can help you choose foods that are low in sodium  The amount of sodium is listed in milligrams (mg)  The % Daily Value (DV) column tells you how much of your daily needs are met by 1 serving of the food for each nutrient listed  Choose foods that have less than 5% of the DV of sodium  These foods are considered low in sodium  Foods that have 20% or more of the DV of sodium are considered high in sodium  Avoid foods that have more than 300 mg of sodium in each serving  Choose foods that say low-sodium, reduced-sodium, or no salt added on the food label  · Avoid salt  Do not salt food at the table, and add very little salt to foods during cooking   Use herbs and spices, such as onions, garlic, and salt-free seasonings to add flavor to foods  Try lemon or lime juice or vinegar to give foods a tart flavor  Use hot peppers or a small amount of hot pepper sauce to add a spicy flavor to foods  · Ask about salt substitutes  Ask your healthcare provider if you may use salt substitutes  Some salt substitutes have ingredients that can be harmful if you have certain health conditions  · Choose foods carefully at restaurants  Meals from restaurants, especially fast food restaurants, are often high in sodium  Some restaurants have nutrition information that tells you the amount of sodium in their foods  Ask to have your food prepared with less, or no salt  What you need to know about fats:   · Include healthy fats  Examples are unsaturated fats and omega-3 fatty acids  Unsaturated fats are found in soybean, canola, olive, or sunflower oil, and liquid and soft tub margarines  Omega-3 fatty acids are found in fatty fish, such as salmon, tuna, mackerel, and sardines  It is also found in flaxseed oil and ground flaxseed  · Avoid unhealthy fats  Do not eat unhealthy fats, such as saturated fats and trans fats  Saturated fats are found in foods that contain fat from animals  Examples are fatty meats, whole milk, butter, cream, and other dairy foods  It is also found in shortening, stick margarine, palm oil, and coconut oil  Trans fats are found in fried foods, crackers, chips, and baked goods made with margarine or shortening  Foods to include: With the DASH eating plan, you need to eat a certain number of servings from each food group  This will help you get enough of certain nutrients and limit others  The amount of servings you should eat depends on how many calories you need  Your dietitian can tell you how many calories you need  The number of servings listed next to the food groups below are for people who need about 2,000 calories each day    · Grains:  6 to 8 servings (3 of these servings should be whole-grain foods)    ¨ 1 slice of whole-grain bread     ¨ 1 ounce of dry cereal    ¨ ½ cup of cooked cereal, pasta, or brown rice    · Vegetables and fruits:  4 to 5 servings of fruits and 4 to 5 servings of vegetables    ¨ 1 medium fruit    ¨ ½ cup of frozen, canned (no added salt), or chopped fresh vegetables     ¨ ½ cup of fresh, frozen, dried, or canned fruit (canned in light syrup or fruit juice)    ¨ ½ cup of vegetable or fruit juice    · Dairy:  2 to 3 servings    ¨ 1 cup of nonfat (skim) or 1% milk    ¨ 1½ ounces of fat-free or low-fat cheese    ¨ 6 ounces of nonfat or low-fat yogurt    · Lean meat, poultry, and fish:  6 ounces or less    [employer] (chicken, turkey) with no skin    ¨ Fish (especially fatty fish, such as salmon, fresh tuna, or mackerel)    ¨ Lean beef and pork (loin, round, extra lean hamburger)    ¨ Egg whites and egg substitutes    · Nuts, seeds, and legumes:  4 to 5 servings each week    ¨ ½ cup of cooked beans and peas    ¨ 1½ ounces of unsalted nuts    ¨ 2 tablespoons of peanut butter or seeds    · Sweets and added sugars:  5 or less each week    ¨ 1 tablespoon of sugar, jelly, or jam    ¨ ½ cup of sorbet or gelatin    ¨ 1 cup of lemonade    · Fats:  2 to 3 servings each week    ¨ 1 teaspoon of soft margarine or vegetable oil    ¨ 1 tablespoon of mayonnaise    ¨ 2 tablespoons of salad dressing  Foods to avoid:   · Grains:      [employer], such as doughnuts, pastries, cookies, and biscuits (high in fat and sugar)    ¨ Mixes for cornbread and biscuits, packaged foods, such as bread stuffing, rice and pasta mixes, macaroni and cheese, and instant cereals (high in sodium)    · Fruits and vegetables:      ¨ Regular, canned vegetables (high in sodium)    ¨ Sauerkraut, pickled vegetables, and other foods prepared in brine (high in sodium)    ¨ Fried vegetables or vegetables in butter or high-fat sauces    ¨ Fruit in cream or butter sauce (high in fat)    · Dairy:      ¨ Whole milk, 2% milk, and cream (high in fat)    ¨ Regular cheese and processed cheese (high in fat and sodium)    · Meats and protein foods:      ¨ Smoked or cured meat, such as corned beef, deleon, ham, hot dogs, and sausage (high in fat and sodium)    ¨ Canned beans and canned meats or spreads, such as potted meats, sardines, anchovies, and imitation seafood (high in sodium)    ¨ Deli or lunch meats, such as bologna, ham, turkey, and roast beef (high in sodium)    ¨ High-fat meat (T-bone steak, regular hamburger, and ribs)    ¨ Whole eggs and egg yolks (high in fat)    · Other:      ¨ Seasonings made with salt, such as garlic salt, celery salt, onion salt, seasoned salt, meat tenderizers, and monosodium glutamate (MSG)    ¨ Miso soup and canned or dried soup mixes (high in sodium)    ¨ Regular soy sauce, barbecue sauce, teriyaki sauce, steak sauce, Worcestershire sauce, and most flavored vinegars (high in sodium)    ¨ Regular condiments, such as mustard, ketchup, and salad dressings (high in sodium)    ¨ Gravy and sauces, such as Bob or cheese sauces (high in sodium and fat)    ¨ Drinks high in sugar, such as soda or fruit drinks    [employer] foods, such as salted chips, popcorn, pretzels, pork rinds, salted crackers, and salted nuts    ¨ Frozen foods, such as dinners, entrees, vegetables with sauces, and breaded meats (high in sodium)  Other guidelines to follow:   · Maintain a healthy weight  Your risk for heart disease is higher if you are overweight  Your healthcare provider may suggest that you lose weight if you are overweight  You can lose weight by eating fewer calories and foods that have added sugars and fat  The DASH meal plan can help you do this  Decrease calories by eating smaller portions at each meal and fewer snacks  Ask your healthcare provider for more information about how to lose weight  · Exercise regularly  Regular exercise can help you reach or maintain a healthy weight   Regular exercise can also help decrease your blood pressure and improve your cholesterol levels  Get 30 minutes or more of moderate exercise each day of the week  To lose weight, get at least 60 minutes of exercise  Talk to your healthcare provider about the best exercise program for you  · Limit alcohol  Women should limit alcohol to 1 drink a day  Men should limit alcohol to 2 drinks a day  A drink of alcohol is 12 ounces of beer, 5 ounces of wine, or 1½ ounces of liquor  © 2017 [facility] Information is for End User's use only and may not be sold, redistributed or otherwise used for commercial purposes  All illustrations and images included in CareNotes® are the copyrighted property of A D A M , Inc  or Dewey Salmon  The above information is an  only  It is not intended as medical advice for individual conditions or treatments  Talk to your doctor, nurse or pharmacist before following any medical regimen to see if it is safe and effective for you

## 2018-03-18 DIAGNOSIS — E78.00 PURE HYPERCHOLESTEROLEMIA: Primary | ICD-10-CM

## 2018-03-18 DIAGNOSIS — K21.9 GASTROESOPHAGEAL REFLUX DISEASE WITHOUT ESOPHAGITIS: ICD-10-CM

## 2018-03-18 RX ORDER — ATORVASTATIN CALCIUM 40 MG/1
TABLET, FILM COATED ORAL
Qty: 30 TABLET | Refills: 7 | Status: SHIPPED | OUTPATIENT
Start: 2018-03-18 | End: 2018-11-26 | Stop reason: SDUPTHER

## 2018-03-18 RX ORDER — FOLIC ACID 1 MG/1
TABLET ORAL
Qty: 30 TABLET | Refills: 7 | Status: SHIPPED | OUTPATIENT
Start: 2018-03-18 | End: 2018-11-26 | Stop reason: SDUPTHER

## 2018-03-18 RX ORDER — OMEPRAZOLE 40 MG/1
CAPSULE, DELAYED RELEASE ORAL
Qty: 30 CAPSULE | Refills: 4 | Status: SHIPPED | OUTPATIENT
Start: 2018-03-18 | End: 2018-08-20 | Stop reason: SDUPTHER

## 2018-04-14 DIAGNOSIS — I10 ESSENTIAL HYPERTENSION: Primary | ICD-10-CM

## 2018-04-14 RX ORDER — INDAPAMIDE 2.5 MG/1
TABLET, FILM COATED ORAL
Qty: 100 TABLET | Refills: 1 | Status: SHIPPED | OUTPATIENT
Start: 2018-04-14 | End: 2018-12-27 | Stop reason: SDUPTHER

## 2018-04-14 RX ORDER — BISOPROLOL FUMARATE 5 MG/1
TABLET ORAL
Qty: 90 TABLET | Refills: 0 | Status: SHIPPED | OUTPATIENT
Start: 2018-04-14 | End: 2018-08-20 | Stop reason: SDUPTHER

## 2018-05-21 DIAGNOSIS — N40.1 BENIGN PROSTATIC HYPERPLASIA WITH URINARY FREQUENCY: Primary | ICD-10-CM

## 2018-05-21 DIAGNOSIS — R35.0 BENIGN PROSTATIC HYPERPLASIA WITH URINARY FREQUENCY: Primary | ICD-10-CM

## 2018-05-22 RX ORDER — TAMSULOSIN HYDROCHLORIDE 0.4 MG/1
CAPSULE ORAL
Qty: 30 CAPSULE | Refills: 6 | Status: SHIPPED | OUTPATIENT
Start: 2018-05-22 | End: 2018-12-27 | Stop reason: SDUPTHER

## 2018-06-19 LAB
ALBUMIN SERPL-MCNC: 4 G/DL (ref 3.5–4.7)
ALBUMIN/CREAT UR: 407.3 MG/G CREAT (ref 0–30)
ALBUMIN/GLOB SERPL: 1.6 {RATIO} (ref 1.2–2.2)
ALP SERPL-CCNC: 53 IU/L (ref 39–117)
ALT SERPL-CCNC: 29 IU/L (ref 0–44)
AMBIG ABBREV DEFAULT: NORMAL
AST SERPL-CCNC: 26 IU/L (ref 0–40)
BASOPHILS # BLD AUTO: 0.1 X10E3/UL (ref 0–0.2)
BASOPHILS NFR BLD AUTO: 1 %
BILIRUB SERPL-MCNC: 0.5 MG/DL (ref 0–1.2)
BUN SERPL-MCNC: 21 MG/DL (ref 8–27)
BUN/CREAT SERPL: 18 (ref 10–24)
CALCIUM SERPL-MCNC: 9.1 MG/DL (ref 8.6–10.2)
CHLORIDE SERPL-SCNC: 100 MMOL/L (ref 96–106)
CHOLEST SERPL-MCNC: 164 MG/DL (ref 100–199)
CO2 SERPL-SCNC: 25 MMOL/L (ref 20–29)
CREAT SERPL-MCNC: 1.19 MG/DL (ref 0.76–1.27)
CREAT UR-MCNC: 120.2 MG/DL
EOSINOPHIL # BLD AUTO: 0.3 X10E3/UL (ref 0–0.4)
EOSINOPHIL NFR BLD AUTO: 5 %
ERYTHROCYTE [DISTWIDTH] IN BLOOD BY AUTOMATED COUNT: 15.1 % (ref 12.3–15.4)
GLOBULIN SER-MCNC: 2.5 G/DL (ref 1.5–4.5)
GLUCOSE SERPL-MCNC: 99 MG/DL (ref 65–99)
HBA1C MFR BLD: 5.5 % (ref 4.8–5.6)
HCT VFR BLD AUTO: 35.7 % (ref 37.5–51)
HDLC SERPL-MCNC: 63 MG/DL
HGB BLD-MCNC: 11.8 G/DL (ref 13–17.7)
IMM GRANULOCYTES # BLD: 0 X10E3/UL (ref 0–0.1)
IMM GRANULOCYTES NFR BLD: 1 %
LDLC SERPL CALC-MCNC: 84 MG/DL (ref 0–99)
LYMPHOCYTES # BLD AUTO: 1.8 X10E3/UL (ref 0.7–3.1)
LYMPHOCYTES NFR BLD AUTO: 31 %
MAGNESIUM SERPL-MCNC: 1.5 MG/DL (ref 1.6–2.3)
MCH RBC QN AUTO: 29.5 PG (ref 26.6–33)
MCHC RBC AUTO-ENTMCNC: 33.1 G/DL (ref 31.5–35.7)
MCV RBC AUTO: 89 FL (ref 79–97)
MICROALBUMIN UR-MCNC: 489.6 UG/ML
MONOCYTES # BLD AUTO: 0.6 X10E3/UL (ref 0.1–0.9)
MONOCYTES NFR BLD AUTO: 10 %
NEUTROPHILS # BLD AUTO: 3 X10E3/UL (ref 1.4–7)
NEUTROPHILS NFR BLD AUTO: 52 %
PLATELET # BLD AUTO: 164 X10E3/UL (ref 150–379)
POTASSIUM SERPL-SCNC: 4.6 MMOL/L (ref 3.5–5.2)
PROT SERPL-MCNC: 6.5 G/DL (ref 6–8.5)
RBC # BLD AUTO: 4 X10E6/UL (ref 4.14–5.8)
SL AMB EGFR AFRICAN AMERICAN: 65 ML/MIN/1.73
SL AMB EGFR NON AFRICAN AMERICAN: 57 ML/MIN/1.73
SL AMB VLDL CHOLESTEROL CALC: 17 MG/DL (ref 5–40)
SODIUM SERPL-SCNC: 141 MMOL/L (ref 134–144)
TRIGL SERPL-MCNC: 83 MG/DL (ref 0–149)
WBC # BLD AUTO: 5.8 X10E3/UL (ref 3.4–10.8)

## 2018-06-27 ENCOUNTER — OFFICE VISIT (OUTPATIENT)
Dept: INTERNAL MEDICINE CLINIC | Facility: CLINIC | Age: 83
End: 2018-06-27
Payer: COMMERCIAL

## 2018-06-27 VITALS
HEIGHT: 66 IN | DIASTOLIC BLOOD PRESSURE: 83 MMHG | RESPIRATION RATE: 15 BRPM | SYSTOLIC BLOOD PRESSURE: 171 MMHG | BODY MASS INDEX: 31.18 KG/M2 | HEART RATE: 57 BPM | TEMPERATURE: 98 F | WEIGHT: 194 LBS

## 2018-06-27 DIAGNOSIS — I10 ESSENTIAL HYPERTENSION: ICD-10-CM

## 2018-06-27 DIAGNOSIS — M35.3 PMR (POLYMYALGIA RHEUMATICA) (HCC): ICD-10-CM

## 2018-06-27 DIAGNOSIS — E78.00 PURE HYPERCHOLESTEROLEMIA: ICD-10-CM

## 2018-06-27 DIAGNOSIS — I73.9 PERIPHERAL VASCULAR DISEASE (HCC): ICD-10-CM

## 2018-06-27 DIAGNOSIS — E11.9 TYPE 2 DIABETES MELLITUS WITHOUT COMPLICATION, WITHOUT LONG-TERM CURRENT USE OF INSULIN (HCC): Primary | ICD-10-CM

## 2018-06-27 DIAGNOSIS — Z86.79 HISTORY OF ABDOMINAL AORTIC ANEURYSM: ICD-10-CM

## 2018-06-27 PROCEDURE — 1101F PT FALLS ASSESS-DOCD LE1/YR: CPT | Performed by: INTERNAL MEDICINE

## 2018-06-27 PROCEDURE — 99214 OFFICE O/P EST MOD 30 MIN: CPT | Performed by: INTERNAL MEDICINE

## 2018-06-27 NOTE — ASSESSMENT & PLAN NOTE
Lab Results   Component Value Date    HGBA1C 5 5 06/18/2018       No results for input(s): POCGLU in the last 72 hours      Blood Sugar Average: Last 72 hrs: excellent control on diet no polyuria polydipsia

## 2018-06-27 NOTE — PATIENT INSTRUCTIONS
DASH Eating Plan   WHAT YOU NEED TO KNOW:   The DASH (Dietary Approaches to Stop Hypertension) Eating Plan is designed to help prevent or lower high blood pressure  It can also help to lower LDL (bad) cholesterol and decrease your risk of heart disease  The plan is low in sodium, sugar, unhealthy fats, and total fat  It is high in potassium, calcium, magnesium, and fiber  These nutrients are added when you eat more fruits, vegetables, and whole grains  DISCHARGE INSTRUCTIONS:   Your sodium limit each day: Your dietitian will tell you how much sodium is safe for you to have each day  People with high blood pressure should have no more than 1,500 to 2,300 mg of sodium in a day  A teaspoon (tsp) of salt has 2,300 mg of sodium  This may seem like a difficult goal, but small changes to the foods you eat can make a big difference  Your healthcare provider or dietitian can help you create a meal plan that follows your sodium limit  How to limit sodium:   · Read food labels  Food labels can help you choose foods that are low in sodium  The amount of sodium is listed in milligrams (mg)  The % Daily Value (DV) column tells you how much of your daily needs are met by 1 serving of the food for each nutrient listed  Choose foods that have less than 5% of the DV of sodium  These foods are considered low in sodium  Foods that have 20% or more of the DV of sodium are considered high in sodium  Avoid foods that have more than 300 mg of sodium in each serving  Choose foods that say low-sodium, reduced-sodium, or no salt added on the food label  · Avoid salt  Do not salt food at the table, and add very little salt to foods during cooking  Use herbs and spices, such as onions, garlic, and salt-free seasonings to add flavor to foods  Try lemon or lime juice or vinegar to give foods a tart flavor  Use hot peppers or a small amount of hot pepper sauce to add a spicy flavor to foods  · Ask about salt substitutes    Ask your healthcare provider if you may use salt substitutes  Some salt substitutes have ingredients that can be harmful if you have certain health conditions  · Choose foods carefully at restaurants  Meals from restaurants, especially fast food restaurants, are often high in sodium  Some restaurants have nutrition information that tells you the amount of sodium in their foods  Ask to have your food prepared with less, or no salt  What you need to know about fats:   · Include healthy fats  Examples are unsaturated fats and omega-3 fatty acids  Unsaturated fats are found in soybean, canola, olive, or sunflower oil, and liquid and soft tub margarines  Omega-3 fatty acids are found in fatty fish, such as salmon, tuna, mackerel, and sardines  It is also found in flaxseed oil and ground flaxseed  · Avoid unhealthy fats  Do not eat unhealthy fats, such as saturated fats and trans fats  Saturated fats are found in foods that contain fat from animals  Examples are fatty meats, whole milk, butter, cream, and other dairy foods  It is also found in shortening, stick margarine, palm oil, and coconut oil  Trans fats are found in fried foods, crackers, chips, and baked goods made with margarine or shortening  Foods to include: With the DASH eating plan, you need to eat a certain number of servings from each food group  This will help you get enough of certain nutrients and limit others  The amount of servings you should eat depends on how many calories you need  Your dietitian can tell you how many calories you need  The number of servings listed next to the food groups below are for people who need about 2,000 calories each day    · Grains:  6 to 8 servings (3 of these servings should be whole-grain foods)    ¨ 1 slice of whole-grain bread     ¨ 1 ounce of dry cereal    ¨ ½ cup of cooked cereal, pasta, or brown rice    · Vegetables and fruits:  4 to 5 servings of fruits and 4 to 5 servings of vegetables    ¨ 1 medium fruit    ¨ ½ cup of frozen, canned (no added salt), or chopped fresh vegetables     ¨ ½ cup of fresh, frozen, dried, or canned fruit (canned in light syrup or fruit juice)    ¨ ½ cup of vegetable or fruit juice    · Dairy:  2 to 3 servings    ¨ 1 cup of nonfat (skim) or 1% milk    ¨ 1½ ounces of fat-free or low-fat cheese    ¨ 6 ounces of nonfat or low-fat yogurt    · Lean meat, poultry, and fish:  6 ounces or less    [employer] (chicken, turkey) with no skin    ¨ Fish (especially fatty fish, such as salmon, fresh tuna, or mackerel)    ¨ Lean beef and pork (loin, round, extra lean hamburger)    ¨ Egg whites and egg substitutes    · Nuts, seeds, and legumes:  4 to 5 servings each week    ¨ ½ cup of cooked beans and peas    ¨ 1½ ounces of unsalted nuts    ¨ 2 tablespoons of peanut butter or seeds    · Sweets and added sugars:  5 or less each week    ¨ 1 tablespoon of sugar, jelly, or jam    ¨ ½ cup of sorbet or gelatin    ¨ 1 cup of lemonade    · Fats:  2 to 3 servings each week    ¨ 1 teaspoon of soft margarine or vegetable oil    ¨ 1 tablespoon of mayonnaise    ¨ 2 tablespoons of salad dressing  Foods to avoid:   · Grains:      [employer], such as doughnuts, pastries, cookies, and biscuits (high in fat and sugar)    ¨ Mixes for cornbread and biscuits, packaged foods, such as bread stuffing, rice and pasta mixes, macaroni and cheese, and instant cereals (high in sodium)    · Fruits and vegetables:      ¨ Regular, canned vegetables (high in sodium)    ¨ Sauerkraut, pickled vegetables, and other foods prepared in brine (high in sodium)    ¨ Fried vegetables or vegetables in butter or high-fat sauces    ¨ Fruit in cream or butter sauce (high in fat)    · Dairy:      ¨ Whole milk, 2% milk, and cream (high in fat)    ¨ Regular cheese and processed cheese (high in fat and sodium)    · Meats and protein foods:      ¨ Smoked or cured meat, such as corned beef, deleon, ham, hot dogs, and sausage (high in fat and sodium)    ¨ Canned beans and canned meats or spreads, such as potted meats, sardines, anchovies, and imitation seafood (high in sodium)    ¨ Deli or lunch meats, such as bologna, ham, turkey, and roast beef (high in sodium)    ¨ High-fat meat (T-bone steak, regular hamburger, and ribs)    ¨ Whole eggs and egg yolks (high in fat)    · Other:      ¨ Seasonings made with salt, such as garlic salt, celery salt, onion salt, seasoned salt, meat tenderizers, and monosodium glutamate (MSG)    ¨ Miso soup and canned or dried soup mixes (high in sodium)    ¨ Regular soy sauce, barbecue sauce, teriyaki sauce, steak sauce, Worcestershire sauce, and most flavored vinegars (high in sodium)    ¨ Regular condiments, such as mustard, ketchup, and salad dressings (high in sodium)    ¨ Gravy and sauces, such as Bob or cheese sauces (high in sodium and fat)    ¨ Drinks high in sugar, such as soda or fruit drinks    [employer] foods, such as salted chips, popcorn, pretzels, pork rinds, salted crackers, and salted nuts    ¨ Frozen foods, such as dinners, entrees, vegetables with sauces, and breaded meats (high in sodium)  Other guidelines to follow:   · Maintain a healthy weight  Your risk for heart disease is higher if you are overweight  Your healthcare provider may suggest that you lose weight if you are overweight  You can lose weight by eating fewer calories and foods that have added sugars and fat  The DASH meal plan can help you do this  Decrease calories by eating smaller portions at each meal and fewer snacks  Ask your healthcare provider for more information about how to lose weight  · Exercise regularly  Regular exercise can help you reach or maintain a healthy weight  Regular exercise can also help decrease your blood pressure and improve your cholesterol levels  Get 30 minutes or more of moderate exercise each day of the week  To lose weight, get at least 60 minutes of exercise   Talk to your healthcare provider about the best exercise program for you  · Limit alcohol  Women should limit alcohol to 1 drink a day  Men should limit alcohol to 2 drinks a day  A drink of alcohol is 12 ounces of beer, 5 ounces of wine, or 1½ ounces of liquor  © 2017 [address] [employer] Information is for End User's use only and may not be sold, redistributed or otherwise used for commercial purposes  All illustrations and images included in CareNotes® are the copyrighted property of A D A M [employer] Inc  or [employer]  The above information is an  only  It is not intended as medical advice for individual conditions or treatments  Talk to your doctor, nurse or pharmacist before following any medical regimen to see if it is safe and effective for you      Watch her salt intake fruits and vegetables are good continue taking her medications for your blood pressure I will see her back in 3 months

## 2018-06-27 NOTE — ASSESSMENT & PLAN NOTE
Denies claudication his wife for significant on wanting him to exercise more I told him to walk like 5 min 3 times a day or use the step program count the steps for the 1st 10 days and increase it by 100 steps for the next week to 10 days and continue until he get to 5000 steps

## 2018-06-27 NOTE — PROGRESS NOTES
Assessment/Plan:    Type 2 diabetes mellitus without complication (formerly [facility])  Lab Results   Component Value Date    HGBA1C 5 5 06/18/2018       No results for input(s): POCGLU in the last 72 hours  Blood Sugar Average: Last 72 hrs: excellent control on diet no polyuria polydipsia      Essential hypertension   Labile but when I checked it was 142/80 will continue ramipril came down to 130/80 standing    Peripheral vascular disease ([facility] [address] )   Denies claudication his wife for significant on wanting him to exercise more I told him to walk like 5 min 3 times a day or use the step program count the steps for the 1st 10 days and increase it by 100 steps for the next week to 10 days and continue until he get to 5000 steps  History of abdominal aortic aneurysm    Continue follow-up with vascular no back pain or abdominal eliezer    Pure hypercholesterolemia   Excellent control on Lipitor 40 mg per day no change in medication         Problem List Items Addressed This Visit     Type 2 diabetes mellitus without complication ([facility] ) - Primary     Lab Results   Component Value Date    HGBA1C 5 5 06/18/2018       No results for input(s): POCGLU in the last 72 hours  Blood Sugar Average: Last 72 hrs: excellent control on diet no polyuria polydipsia           Relevant Orders    CBC and differential    Hemoglobin A1C    Vitamin D 25 hydroxy    Basic metabolic panel    TSH, 3rd generation with Free T4 reflex    Peripheral vascular disease ([facility] [address] )      Denies claudication his wife for significant on wanting him to exercise more I told him to walk like 5 min 3 times a day or use the step program count the steps for the 1st 10 days and increase it by 100 steps for the next week to 10 days and continue until he get to 5000 steps           Relevant Orders    CBC and differential    Hemoglobin A1C    Vitamin D 25 hydroxy    Basic metabolic panel    TSH, 3rd generation with Free T4 reflex    History of abdominal aortic aneurysm       Continue follow-up with vascular no back pain or abdominal eliezer         Relevant Orders    CBC and differential    Hemoglobin A1C    Vitamin D 25 hydroxy    Basic metabolic panel    TSH, 3rd generation with Free T4 reflex    Essential hypertension      Labile but when I checked it was 142/80 will continue ramipril came down to 130/80 standing         Relevant Orders    CBC and differential    Hemoglobin A1C    Vitamin D 25 hydroxy    Basic metabolic panel    TSH, 3rd generation with Free T4 reflex    Pure hypercholesterolemia      Excellent control on Lipitor 40 mg per day no change in medication         Relevant Orders    CBC and differential    Hemoglobin A1C    Vitamin D 25 hydroxy    Basic metabolic panel    TSH, 3rd generation with Free T4 reflex      Other Visit Diagnoses     PMR (polymyalgia rheumatica) (HCC)        Relevant Orders    Sedimentation rate, automated    C-reactive protein    Ferritin    Iron Panel            Subjective:      Patient ID: Constantin Prado is a 81 y o  male  Chief Complaint   Patient presents with    Follow-up     Colonoscopy done 12 6 2010 - WAS DUE 2013           Current Outpatient Prescriptions:     aspirin 81 MG tablet, Take 1 tablet by mouth daily, Disp: , Rfl:     atorvastatin (LIPITOR) 40 mg tablet, TAKE 1 TABLET BY MOUTH ONCE DAILY, Disp: 30 tablet, Rfl: 7    bisoprolol (ZEBETA) 5 mg tablet, take 1 tablet by mouth once daily, Disp: 90 tablet, Rfl: 0    Cholecalciferol (VITAMIN D3) 1000 units CAPS, Take by mouth, Disp: , Rfl:     ferrous sulfate 325 (65 Fe) mg tablet, Take by mouth, Disp: , Rfl:     folic acid (FOLVITE) 1 mg tablet, take 1 tablet by mouth once daily, Disp: 30 tablet, Rfl: 7    glucose blood (TRUETRACK TEST) test strip, by In Vitro route 2 (two) times a day, Disp: , Rfl:     indapamide (LOZOL) 2 5 mg tablet, take 1 tablet by mouth once daily, Disp: 100 tablet, Rfl: 1    Lysine HCl 1000 MG TABS, Take by mouth, Disp: , Rfl:     magnesium chloride-calcium (MAG-SR PLUS CALCIUM)  mg, Take 2 tablets by mouth daily, Disp: , Rfl:     multivitamin-minerals (CENTRUM ADULTS) tablet, Take 1 tablet by mouth daily, Disp: , Rfl:     omeprazole (PriLOSEC) 40 MG capsule, TAKE ONE CAPSULE BY MOUTH EVERY DAY, Disp: 30 capsule, Rfl: 4    predniSONE 1 mg tablet, Take 4 tablets by mouth daily, Disp: , Rfl: 0    ramipril (ALTACE) 10 MG capsule, Take 1 capsule by mouth daily, Disp: , Rfl:     tamsulosin (FLOMAX) 0 4 mg, take 1 capsule by mouth once daily, Disp: 30 capsule, Rfl: 6    VIAGRA 50 MG tablet, Take 50 mg by mouth as needed Take 1 hour before needed, Disp: , Rfl: 0     Problem 1  High blood pressure well control ramipril 10 mg per day denies any chest pain palpitation shortness of breath continue with the same plan he does have micros copy proteinuria his level was 400 but his level a few months ago was 1200 so he has improved  The    Diabetes control with diet was worse when he was on a higher dose of prednisone getting treated for polymyalgia rheumatica is down to 2 mg per day he is going to see the rheumatologist soon  Hyperlipidemia excellent continue Lipitor 40 mg per day no myalgias on that dose    Abdominal aortic aneurysm status post repair being followed by vascular denies any abdominal pain or back pain  The following portions of the patient's history were reviewed and updated as appropriate: allergies, current medications, past family history, past medical history, past social history, past surgical history and problem list     Review of Systems   Constitutional: Negative  Negative for activity change, appetite change, fatigue, fever and unexpected weight change  HENT: Negative for congestion, ear pain, hearing loss, mouth sores, postnasal drip, rhinorrhea, sore throat, trouble swallowing and voice change  Eyes: Negative for pain, redness and visual disturbance     Respiratory: Negative for cough, chest tightness, shortness of breath and wheezing  Cardiovascular: Negative for chest pain, palpitations and leg swelling  Gastrointestinal: Negative for abdominal distention, abdominal pain, blood in stool, constipation, diarrhea and nausea  Endocrine: Negative for cold intolerance, heat intolerance, polydipsia, polyphagia and polyuria  Genitourinary: Negative for difficulty urinating, dysuria, flank pain, frequency, hematuria and urgency  Musculoskeletal: Negative for arthralgias, back pain, gait problem, joint swelling and myalgias  Skin: Negative for color change and pallor  Neurological: Negative for dizziness, tremors, seizures, syncope, weakness, numbness and headaches  Hematological: Negative for adenopathy  Does not bruise/bleed easily  Psychiatric/Behavioral: Negative  Negative for sleep disturbance  The patient is not nervous/anxious  Objective:    Results for orders placed or performed in visit on 06/18/18   Tanika Zamudio Default   Result Value Ref Range    [facility] LAB WHITE BLOOD CELL COUNT 5 8 3 4 - 10 8 x10E3/uL    [facility] LAB RED BLOOD CELLS 4 00 (L) 4 14 - 5 80 x10E6/uL    Hemoglobin 11 8 (L) 13 0 - 17 7 g/dL    Hematocrit 35 7 (L) 37 5 - 51 0 %    MCV 89 79 - 97 fL    MCH 29 5 26 6 - 33 0 pg    MCHC 33 1 31 5 - 35 7 g/dL    RDW 15 1 12 3 - 15 4 %    Platelet Count 795 961 - 379 x10E3/uL    Neutrophils 52 Not Estab  %    Lymphocytes 31 Not Estab  %    Monocytes 10 Not Estab  %    Eosinophils 5 Not Estab  %    Basophils 1 Not Estab  %    Neutrophils (Absolute) 3 0 1 4 - 7 0 x10E3/uL    Lymphocytes (Absolute) 1 8 0 7 - 3 1 x10E3/uL    Monocytes (Absolute) 0 6 0 1 - 0 9 x10E3/uL    Eosinophils (Absolute) 0 3 0 0 - 0 4 x10E3/uL    Basophils (Absolute) 0 1 0 0 - 0 2 x10E3/uL    IMM  GRANULOCYTES (CD4/8) 1 Not Estab  %    IIMM  GRANS,ABS (CD4/8) 0 0 0 0 - 0 1 x10E3/uL   Comprehensive metabolic panel   Result Value Ref Range     AMB GLUCOSE 99 65 - 99 mg/dL    BUN 21 8 - 27 mg/dL    Creatinine, Serum 1 19 0 76 - 1 27 mg/dL    eGFR Non  57 (L) >59 mL/min/1 73    SL AMB EGFR AFRICAN AMERICAN 65 >59 mL/min/1 73    SL AMB BUN/CREATININE RATIO 18 10 - 24    SL AMB SODIUM 141 134 - 144 mmol/L    SL AMB POTASSIUM 4 6 3 5 - 5 2 mmol/L    SL AMB CHLORIDE 100 96 - 106 mmol/L    SL AMB CARBON DIOXIDE 25 20 - 29 mmol/L    CALCIUM 9 1 8 6 - 10 2 mg/dL    SL AMB PROTEIN, TOTAL 6 5 6 0 - 8 5 g/dL    Serum Albumin 4 0 3 5 - 4 7 g/dL    Globulin, Total 2 5 1 5 - 4 5 g/dL    SL AMB ALBUMIN/GLOBULIN RATIO 1 6 1 2 - 2 2    SL AMB BILIRUBIN, TOTAL 0 5 0 0 - 1 2 mg/dL    Alk Phos Isoenzymes 53 39 - 117 IU/L    SL AMB AST 26 0 - 40 IU/L    SL AMB ALT 29 0 - 44 IU/L   Lipid Panel with Direct LDL reflex   Result Value Ref Range    Cholesterol, Total 164 100 - 199 mg/dL    Triglycerides 83 0 - 149 mg/dL    SL AMB HDL CHOLESTEROL 63 >39 mg/dL    SL AMB VLDL CHOLESTEROL CALC 17 5 - 40 mg/dL    SL AMB LDL-CHOLESTEROL 84 0 - 99 mg/dL   Microalbumin / creatinine urine ratio   Result Value Ref Range    Creatinine, Urine 120 2 Not Estab  mg/dL    Microalbum  ,U,Random 489 6 Not Estab  ug/mL    Microalb/Creat Ratio 407 3 (H) 0 0 - 30 0 mg/g creat   Hemoglobin A1c (w/out EAG)   Result Value Ref Range    Hemoglobin A1C 5 5 4 8 - 5 6 %   Magnesium   Result Value Ref Range    Magnesium, Serum 1 5 (L) 1 6 - 2 3 mg/dL   Ambig Abbrev Default   Result Value Ref Range    AMBIG ABBREV DEFAULT Comment        BP (!) 171/83 (BP Location: Left arm, Patient Position: Sitting, Cuff Size: Standard)   Pulse 57   Temp 98 °F (36 7 °C)   Resp 15   Ht 5' 6" (1 676 m)   Wt 88 kg (194 lb)   BMI 31 31 kg/m²      Physical Exam   Constitutional: He is oriented to person, place, and time  He appears well-developed and well-nourished  HENT:   Head: Normocephalic  Right Ear: External ear normal    Left Ear: External ear normal    Nose: Nose normal    Mouth/Throat: Oropharynx is clear and moist  No oropharyngeal exudate     No cerumen impaction   Eyes: Conjunctivae and EOM are normal  Pupils are equal, round, and reactive to light  Neck: Normal range of motion  Neck supple  No thyromegaly present  Cardiovascular: Normal rate, regular rhythm, normal heart sounds and intact distal pulses  Exam reveals no gallop and no friction rub  No murmur heard  S1-S2 no gallops regular rhythm extremities no edema   Pulmonary/Chest: Effort normal and breath sounds normal  No respiratory distress  He has no wheezes  He has no rales  Lungs clear   Abdominal: Soft  Bowel sounds are normal  He exhibits no distension and no mass  There is no tenderness  There is no rebound and no guarding  Abdomen obese soft nontender   Musculoskeletal: Normal range of motion  Lymphadenopathy:     He has no cervical adenopathy  Neurological: He is alert and oriented to person, place, and time  Skin: Skin is warm and dry  Psychiatric: He has a normal mood and affect  His behavior is normal  Judgment normal    Nursing note and vitals reviewed

## 2018-07-23 DIAGNOSIS — I10 ESSENTIAL HYPERTENSION: Primary | ICD-10-CM

## 2018-07-23 DIAGNOSIS — E55.9 VITAMIN D DEFICIENCY: ICD-10-CM

## 2018-07-23 RX ORDER — BIOTIN 1 MG
TABLET ORAL
Qty: 30 CAPSULE | Refills: 2 | Status: SHIPPED | OUTPATIENT
Start: 2018-07-23 | End: 2019-02-24 | Stop reason: SDUPTHER

## 2018-07-23 RX ORDER — RAMIPRIL 10 MG/1
CAPSULE ORAL
Qty: 30 CAPSULE | Refills: 6 | Status: SHIPPED | OUTPATIENT
Start: 2018-07-23 | End: 2019-02-24 | Stop reason: SDUPTHER

## 2018-08-20 DIAGNOSIS — K21.9 GASTROESOPHAGEAL REFLUX DISEASE WITHOUT ESOPHAGITIS: ICD-10-CM

## 2018-08-20 DIAGNOSIS — I10 ESSENTIAL HYPERTENSION: ICD-10-CM

## 2018-08-20 RX ORDER — OMEPRAZOLE 40 MG/1
CAPSULE, DELAYED RELEASE ORAL
Qty: 30 CAPSULE | Refills: 4 | Status: SHIPPED | OUTPATIENT
Start: 2018-08-20 | End: 2019-01-28 | Stop reason: SDUPTHER

## 2018-08-20 RX ORDER — BISOPROLOL FUMARATE 5 MG/1
TABLET ORAL
Qty: 90 TABLET | Refills: 0 | Status: SHIPPED | OUTPATIENT
Start: 2018-08-20 | End: 2018-11-26 | Stop reason: SDUPTHER

## 2018-09-26 ENCOUNTER — OFFICE VISIT (OUTPATIENT)
Dept: INTERNAL MEDICINE CLINIC | Facility: CLINIC | Age: 83
End: 2018-09-26
Payer: COMMERCIAL

## 2018-09-26 VITALS
DIASTOLIC BLOOD PRESSURE: 74 MMHG | HEART RATE: 61 BPM | RESPIRATION RATE: 15 BRPM | TEMPERATURE: 99 F | BODY MASS INDEX: 31.18 KG/M2 | SYSTOLIC BLOOD PRESSURE: 147 MMHG | HEIGHT: 66 IN | WEIGHT: 194 LBS

## 2018-09-26 DIAGNOSIS — E78.00 PURE HYPERCHOLESTEROLEMIA: ICD-10-CM

## 2018-09-26 DIAGNOSIS — I73.9 PERIPHERAL VASCULAR DISEASE (HCC): ICD-10-CM

## 2018-09-26 DIAGNOSIS — I10 ESSENTIAL HYPERTENSION: ICD-10-CM

## 2018-09-26 DIAGNOSIS — Z23 NEED FOR INFLUENZA VACCINATION: ICD-10-CM

## 2018-09-26 DIAGNOSIS — E11.9 TYPE 2 DIABETES MELLITUS WITHOUT COMPLICATION, WITHOUT LONG-TERM CURRENT USE OF INSULIN (HCC): Primary | ICD-10-CM

## 2018-09-26 DIAGNOSIS — M35.3 POLYMYALGIA RHEUMATICA (HCC): ICD-10-CM

## 2018-09-26 LAB
25(OH)D3+25(OH)D2 SERPL-MCNC: 51.2 NG/ML (ref 30–100)
AMBIG ABBREV DEFAULT: NORMAL
BASOPHILS # BLD AUTO: 0 X10E3/UL (ref 0–0.2)
BASOPHILS NFR BLD AUTO: 0 %
BUN SERPL-MCNC: 35 MG/DL (ref 8–27)
BUN/CREAT SERPL: 29 (ref 10–24)
CALCIUM SERPL-MCNC: 9.7 MG/DL (ref 8.6–10.2)
CHLORIDE SERPL-SCNC: 99 MMOL/L (ref 96–106)
CO2 SERPL-SCNC: 24 MMOL/L (ref 20–29)
CREAT SERPL-MCNC: 1.21 MG/DL (ref 0.76–1.27)
CRP SERPL-MCNC: 10.9 MG/L (ref 0–4.9)
EOSINOPHIL # BLD AUTO: 0.2 X10E3/UL (ref 0–0.4)
EOSINOPHIL NFR BLD AUTO: 2 %
ERYTHROCYTE [DISTWIDTH] IN BLOOD BY AUTOMATED COUNT: 15.2 % (ref 12.3–15.4)
ERYTHROCYTE [SEDIMENTATION RATE] IN BLOOD BY WESTERGREN METHOD: 18 MM/HR (ref 0–30)
FERRITIN SERPL-MCNC: 94 NG/ML (ref 30–400)
GLUCOSE SERPL-MCNC: 92 MG/DL (ref 65–99)
HBA1C MFR BLD: 5.8 % (ref 4.8–5.6)
HCT VFR BLD AUTO: 35.7 % (ref 37.5–51)
HGB BLD-MCNC: 12.1 G/DL (ref 13–17.7)
IMM GRANULOCYTES # BLD: 0 X10E3/UL (ref 0–0.1)
IMM GRANULOCYTES NFR BLD: 0 %
IRON SERPL-MCNC: 60 UG/DL (ref 38–169)
LYMPHOCYTES # BLD AUTO: 2.6 X10E3/UL (ref 0.7–3.1)
LYMPHOCYTES NFR BLD AUTO: 34 %
MCH RBC QN AUTO: 29.7 PG (ref 26.6–33)
MCHC RBC AUTO-ENTMCNC: 33.9 G/DL (ref 31.5–35.7)
MCV RBC AUTO: 88 FL (ref 79–97)
MONOCYTES # BLD AUTO: 0.7 X10E3/UL (ref 0.1–0.9)
MONOCYTES NFR BLD AUTO: 10 %
NEUTROPHILS # BLD AUTO: 4 X10E3/UL (ref 1.4–7)
NEUTROPHILS NFR BLD AUTO: 54 %
PLATELET # BLD AUTO: 269 X10E3/UL (ref 150–379)
POTASSIUM SERPL-SCNC: 4.3 MMOL/L (ref 3.5–5.2)
RBC # BLD AUTO: 4.08 X10E6/UL (ref 4.14–5.8)
SL AMB EGFR AFRICAN AMERICAN: 64 ML/MIN/1.73
SL AMB EGFR NON AFRICAN AMERICAN: 55 ML/MIN/1.73
SODIUM SERPL-SCNC: 139 MMOL/L (ref 134–144)
TSH SERPL DL<=0.005 MIU/L-ACNC: 3.27 UIU/ML (ref 0.45–4.5)
WBC # BLD AUTO: 7.5 X10E3/UL (ref 3.4–10.8)

## 2018-09-26 PROCEDURE — 3008F BODY MASS INDEX DOCD: CPT | Performed by: INTERNAL MEDICINE

## 2018-09-26 PROCEDURE — 99214 OFFICE O/P EST MOD 30 MIN: CPT | Performed by: INTERNAL MEDICINE

## 2018-09-26 PROCEDURE — 1160F RVW MEDS BY RX/DR IN RCRD: CPT | Performed by: INTERNAL MEDICINE

## 2018-09-26 PROCEDURE — G0008 ADMIN INFLUENZA VIRUS VAC: HCPCS | Performed by: INTERNAL MEDICINE

## 2018-09-26 PROCEDURE — 90662 IIV NO PRSV INCREASED AG IM: CPT | Performed by: INTERNAL MEDICINE

## 2018-09-26 PROCEDURE — 1036F TOBACCO NON-USER: CPT | Performed by: INTERNAL MEDICINE

## 2018-09-26 NOTE — ASSESSMENT & PLAN NOTE
Well control I got a blood pressure 140/80 right arm sitting 130/80 right arm standing    He is taking the following medications leg spine     ramipril 10 mg  Bisoprolol 5 mg   Indapamide 2 5 mg renal function normal tolerating medications

## 2018-09-26 NOTE — PROGRESS NOTES
Assessment/Plan: no change in meds he went up on his prednisone at the direction of Rheumatology and Pain Management he is tapering off now  Will check a sed rate and CRP with the next lab work will give him if influenza vaccine before he goes    Type 2 [facility] mellitus without complication ([facility]  Lab Results   Component Value Date    HGBA1C 5 8 (H) 09/25/2018       No results for input(s): POCGLU in the last 72 hours  Blood Sugar Average: Last 72 hrs: excellent control no polyuria polydipsia  Essential hypertension    Well control I got a blood pressure 140/80 right arm sitting 130/80 right arm standing  He is taking the following medications leg spine     ramipril 10 mg  Bisoprolol 5 mg   Indapamide 2 5 mg renal function normal tolerating medications    Peripheral vascular disease (HCC)   No claudication is on a walking program history abdominal aortic aneurysm being followed closely by his vascular surgeon  Pure hypercholesterolemia    Takes Lipitor 40 mg per day no myalgias will check a lipid profile with the next labs       Diagnoses and all orders for this visit:    Type 2 diabetes mellitus without complication, without long-term current use of insulin (HCC)  -     CBC and differential; Future  -     Comprehensive metabolic panel; Future  -     Lipid Panel with Direct LDL reflex; Future  -     TSH, 3rd generation with Free T4 reflex; Future  -     Urinalysis with reflex to microscopic  -     Vitamin D 25 hydroxy; Future  -     Gamma GT; Future  -     Hemoglobin A1C; Future  -     Magnesium; Future    Essential hypertension  -     CBC and differential; Future  -     Comprehensive metabolic panel; Future  -     Lipid Panel with Direct LDL reflex; Future  -     TSH, 3rd generation with Free T4 reflex; Future  -     Urinalysis with reflex to microscopic  -     Vitamin D 25 hydroxy; Future  -     Gamma GT; Future  -     Hemoglobin A1C; Future  -     Magnesium;  Future    [facility]  -     CBC and differential; Future  -     Comprehensive metabolic panel; Future  -     Lipid Panel with Direct LDL reflex; Future  -     TSH, 3rd generation with Free T4 reflex; Future  -     Urinalysis with reflex to microscopic  -     Vitamin D 25 hydroxy; Future  -     Gamma GT; Future  -     Hemoglobin A1C; Future  -     Magnesium; Future    Pure hypercholesterolemia  -     CBC and differential; Future  -     Comprehensive metabolic panel; Future  -     Lipid Panel with Direct LDL reflex; Future  -     TSH, 3rd generation with Free T4 reflex; Future  -     Urinalysis with reflex to microscopic  -     Vitamin D 25 hydroxy; Future  -     Gamma GT; Future  -     Hemoglobin A1C; Future  -     Magnesium; Future    Polymyalgia rheumatica (HCC)  -     Sedimentation rate, automated; Future  -     C-reactive protein; Future          Subjective:      Patient ID: Shasta Jiang is a 81 y o  male      Chief Complaint   Patient presents with    Follow-up         Current Outpatient Prescriptions:     aspirin 81 MG tablet, Take 1 tablet by mouth daily, Disp: , Rfl:     atorvastatin (LIPITOR) 40 mg tablet, TAKE 1 TABLET BY MOUTH ONCE DAILY, Disp: 30 tablet, Rfl: 7    bisoprolol (ZEBETA) 5 mg tablet, take 1 tablet by mouth once daily, Disp: 90 tablet, Rfl: 0    Cholecalciferol (VITAMIN D3) 1000 units CAPS, take 1 capsule  Monday Wednesday and Friday weekly, Disp: 30 capsule, Rfl: 2    ferrous sulfate 325 (65 Fe) mg tablet, Take by mouth, Disp: , Rfl:     folic acid (FOLVITE) 1 mg tablet, take 1 tablet by mouth once daily, Disp: 30 tablet, Rfl: 7    glucose blood (TRUETRACK TEST) test strip, by In Vitro route 2 (two) times a day, Disp: , Rfl:     indapamide (LOZOL) 2 5 mg tablet, take 1 tablet by mouth once daily, Disp: 100 tablet, Rfl: 1    Lysine HCl 1000 MG TABS, Take by mouth, Disp: , Rfl:     magnesium chloride-calcium (MAG-SR PLUS CALCIUM)  mg, Take 2 tablets by mouth daily, Disp: , Rfl:    multivitamin-minerals (CENTRUM ADULTS) tablet, Take 1 tablet by mouth daily, Disp: , Rfl:     omeprazole (PriLOSEC) 40 MG capsule, take 1 capsule by mouth once daily, Disp: 30 capsule, Rfl: 4    predniSONE 1 mg tablet, Take 4 tablets by mouth daily, Disp: , Rfl: 0    ramipril (ALTACE) 10 MG capsule, take 1 capsule by mouth once daily, Disp: 30 capsule, Rfl: 6    tamsulosin (FLOMAX) 0 4 mg, take 1 capsule by mouth once daily, Disp: 30 capsule, Rfl: 6    VIAGRA 50 MG tablet, Take 50 mg by mouth as needed Take 1 hour before needed, Disp: , Rfl: 0    HPI    The following portions of the patient's history were reviewed and updated as appropriate: allergies, current medications, past family history, past medical history, past social history, past surgical history and problem list     Review of Systems   Constitutional: Negative  Negative for activity change, appetite change, fatigue, fever and unexpected weight change  HENT: Negative for congestion, ear pain, hearing loss, mouth sores, postnasal drip, rhinorrhea, sore throat, trouble swallowing and voice change  Eyes: Negative for pain, redness and visual disturbance  Respiratory: Negative for cough, chest tightness, shortness of breath and wheezing  Cardiovascular: Negative for chest pain, palpitations and leg swelling  Gastrointestinal: Negative for abdominal distention, abdominal pain, blood in stool, constipation, diarrhea and nausea  Endocrine: Negative for cold intolerance, heat intolerance, polydipsia, polyphagia and polyuria  Genitourinary: Negative for difficulty urinating, dysuria, flank pain, frequency, hematuria and urgency  Musculoskeletal: Negative for arthralgias, back pain, gait problem, joint swelling and myalgias  Skin: Negative for color change and pallor  Neurological: Negative for dizziness, tremors, seizures, syncope, weakness, numbness and headaches  Hematological: Negative for adenopathy  Does not bruise/bleed easily  Psychiatric/Behavioral: Negative  Negative for sleep disturbance  The patient is not nervous/anxious            Objective:    Results for orders placed or performed in visit on 09/25/18   CBC and differential   Result Value Ref Range    White Blood Cell Count 7 5 3 4 - 10 8 x10E3/uL    Red Blood Cell Count 4 08 (L) 4 14 - 5 80 x10E6/uL    Hemoglobin 12 1 (L) 13 0 - 17 7 g/dL    HCT 35 7 (L) 37 5 - 51 0 %    MCV 88 79 - 97 fL    MCH 29 7 26 6 - 33 0 pg    MCHC 33 9 31 5 - 35 7 g/dL    RDW 15 2 12 3 - 15 4 %    Platelet Count 598 948 - 379 x10E3/uL    Neutrophils 54 Not Estab  %    Lymphocytes 34 Not Estab  %    Monocytes 10 Not Estab  %    Eosinophils 2 Not Estab  %    Basophils Relative 0 Not Estab  %    Neutrophils (Absolute) 4 0 1 4 - 7 0 x10E3/uL    Lymphocytes (Absolute) 2 6 0 7 - 3 1 x10E3/uL    Monocytes (Absolute) 0 7 0 1 - 0 9 x10E3/uL    Eosinophils (Absolute) 0 2 0 0 - 0 4 x10E3/uL    Basophils (Absolute) 0 0 0 0 - 0 2 x10E3/uL    Immature Granulocytes 0 Not Estab  %    Immature Granulocytes (Absolute) 0 0 0 0 - 0 1 [id number]/AF   Basic metabolic panel   Result Value Ref Range    Glucose 92 65 - 99 mg/dL    BUN 35 (H) 8 - 27 mg/dL    Creatinine 1 21 0 76 - 1 27 mg/dL    eGFR Non African American 55 (L) >59 mL/min/1 73    SL AMB EGFR AFRICAN AMERICAN 64 >59 mL/min/1 73    SL AMB BUN/CREATININE RATIO 29 (H) 10 - 24    Sodium 139 134 - 144 mmol/L    SL AMB POTASSIUM 4 3 3 5 - 5 2 mmol/L    Chloride 99 96 - 106 mmol/L    CO2 24 20 - 29 mmol/L    CALCIUM 9 7 8 6 - 10 2 mg/dL   Hemoglobin A1c (w/out EAG)   Result Value Ref Range    Hemoglobin A1C 5 8 (H) 4 8 - 5 6 %   Vitamin D 25 hydroxy   Result Value Ref Range    25-HYDROXY VIT D 51 2 30 0 - 100 0 ng/mL   TSH, 3rd generation with Free T4 reflex   Result Value Ref Range    TSH 3 270 0 450 - 4 500 uIU/mL   Iron   Result Value Ref Range    Iron, Serum 60 38 - 169 ug/dL   Sedimentation rate, automated   Result Value Ref Range    SL AMB SED RATE BY MODIFIED WESTERGREN 18 0 - 30 mm/hr   Ferritin   Result Value Ref Range    Ferritin 94 30 - 400 ng/mL   C-reactive protein   Result Value Ref Range    C-Reactive Protein, Quant 10 9 (H) 0 0 - 4 9 mg/L   Ambig Abbrev Default   Result Value Ref Range    AMBIG ABBREV DEFAULT Comment        /74 (BP Location: Left arm, Patient Position: Sitting, Cuff Size: Standard)   Pulse 61   Temp 99 °F (37 2 °C)   Resp 15   Ht 5' 6" (1 676 m)   Wt 88 kg (194 lb)   BMI 31 31 kg/m²      Physical Exam   Constitutional: He is oriented to person, place, and time  He appears well-developed and well-nourished  HENT:   Head: Normocephalic  Right Ear: External ear normal    Left Ear: External ear normal    Nose: Nose normal    Mouth/Throat: Oropharynx is clear and moist  No oropharyngeal exudate  Eyes: Conjunctivae and EOM are normal  Pupils are equal, round, and reactive to light  Neck: Normal range of motion  Neck supple  No thyromegaly present  Cardiovascular: Normal rate, regular rhythm, normal heart sounds and intact distal pulses  Exam reveals no gallop and no friction rub  No murmur heard  S1-S2 regular rhythm no gallops  Extremities no edema   Pulmonary/Chest: Effort normal and breath sounds normal  No respiratory distress  He has no wheezes  He has no rales  Lungs are clear no wheezing rales or rhonchi   Abdominal: Soft  Bowel sounds are normal  He exhibits no distension and no mass  There is no tenderness  There is no rebound and no guarding  Abdomen soft nontender   Musculoskeletal: Normal range of motion  Lymphadenopathy:     He has no cervical adenopathy  Neurological: He is alert and oriented to person, place, and time  Skin: Skin is warm and dry  Psychiatric: He has a normal mood and affect  His behavior is normal  Judgment normal    Nursing note and vitals reviewed

## 2018-09-26 NOTE — ASSESSMENT & PLAN NOTE
No claudication is on a walking program history abdominal aortic aneurysm being followed closely by his vascular surgeon

## 2018-09-26 NOTE — ASSESSMENT & PLAN NOTE
Lab Results   Component Value Date    HGBA1C 5 8 (H) 09/25/2018       No results for input(s): POCGLU in the last 72 hours  Blood Sugar Average: Last 72 hrs: excellent control no polyuria polydipsia

## 2018-09-26 NOTE — PATIENT INSTRUCTIONS
Blood pressure is stable your labs are stable prednisone to be tapered by rheumatologist on a gradual basis will see her back in 4 months

## 2018-11-26 DIAGNOSIS — E78.00 PURE HYPERCHOLESTEROLEMIA: ICD-10-CM

## 2018-11-26 DIAGNOSIS — K21.9 GASTROESOPHAGEAL REFLUX DISEASE WITHOUT ESOPHAGITIS: ICD-10-CM

## 2018-11-26 DIAGNOSIS — I10 ESSENTIAL HYPERTENSION: ICD-10-CM

## 2018-11-26 RX ORDER — ATORVASTATIN CALCIUM 40 MG/1
40 TABLET, FILM COATED ORAL DAILY
Qty: 90 TABLET | Refills: 0 | Status: SHIPPED | OUTPATIENT
Start: 2018-11-26 | End: 2019-02-24 | Stop reason: SDUPTHER

## 2018-11-26 RX ORDER — FOLIC ACID 1 MG/1
1000 TABLET ORAL DAILY
Qty: 90 TABLET | Refills: 0 | Status: SHIPPED | OUTPATIENT
Start: 2018-11-26 | End: 2019-01-28 | Stop reason: SDUPTHER

## 2018-11-26 RX ORDER — BISOPROLOL FUMARATE 5 MG/1
5 TABLET ORAL DAILY
Qty: 90 TABLET | Refills: 0 | Status: SHIPPED | OUTPATIENT
Start: 2018-11-26 | End: 2019-02-24 | Stop reason: SDUPTHER

## 2018-12-27 DIAGNOSIS — E61.1 LOW IRON: Primary | ICD-10-CM

## 2018-12-27 DIAGNOSIS — N40.1 BENIGN PROSTATIC HYPERPLASIA WITH URINARY FREQUENCY: ICD-10-CM

## 2018-12-27 DIAGNOSIS — R35.0 BENIGN PROSTATIC HYPERPLASIA WITH URINARY FREQUENCY: ICD-10-CM

## 2018-12-27 DIAGNOSIS — I10 ESSENTIAL HYPERTENSION: ICD-10-CM

## 2018-12-27 RX ORDER — INDAPAMIDE 2.5 MG/1
2.5 TABLET, FILM COATED ORAL DAILY
Qty: 100 TABLET | Refills: 0 | Status: SHIPPED | OUTPATIENT
Start: 2018-12-27 | End: 2019-04-24 | Stop reason: SDUPTHER

## 2018-12-27 RX ORDER — FERROUS SULFATE 325(65) MG
TABLET ORAL
Qty: 12 TABLET | Refills: 4 | Status: SHIPPED | OUTPATIENT
Start: 2018-12-27 | End: 2019-09-05 | Stop reason: SDUPTHER

## 2018-12-27 RX ORDER — TAMSULOSIN HYDROCHLORIDE 0.4 MG/1
0.4 CAPSULE ORAL DAILY
Qty: 30 CAPSULE | Refills: 4 | Status: SHIPPED | OUTPATIENT
Start: 2018-12-27 | End: 2019-06-03 | Stop reason: SDUPTHER

## 2019-01-26 LAB
25(OH)D3+25(OH)D2 SERPL-MCNC: 44.5 NG/ML (ref 30–100)
ALBUMIN SERPL-MCNC: 4.4 G/DL (ref 3.5–4.7)
ALBUMIN/GLOB SERPL: 1.7 {RATIO} (ref 1.2–2.2)
ALP SERPL-CCNC: 62 IU/L (ref 39–117)
ALT SERPL-CCNC: 22 IU/L (ref 0–44)
APPEARANCE UR: CLEAR
AST SERPL-CCNC: 22 IU/L (ref 0–40)
BACTERIA URNS QL MICRO: NORMAL
BASOPHILS # BLD AUTO: 0.1 X10E3/UL (ref 0–0.2)
BASOPHILS NFR BLD AUTO: 1 %
BILIRUB SERPL-MCNC: 0.7 MG/DL (ref 0–1.2)
BILIRUB UR QL STRIP: NEGATIVE
BUN SERPL-MCNC: 25 MG/DL (ref 8–27)
BUN/CREAT SERPL: 20 (ref 10–24)
CALCIUM SERPL-MCNC: 9.5 MG/DL (ref 8.6–10.2)
CHLORIDE SERPL-SCNC: 100 MMOL/L (ref 96–106)
CHOLEST SERPL-MCNC: 179 MG/DL (ref 100–199)
CO2 SERPL-SCNC: 25 MMOL/L (ref 20–29)
COLOR UR: YELLOW
CREAT SERPL-MCNC: 1.27 MG/DL (ref 0.76–1.27)
EOSINOPHIL # BLD AUTO: 0.3 X10E3/UL (ref 0–0.4)
EOSINOPHIL NFR BLD AUTO: 5 %
EPI CELLS #/AREA URNS HPF: NORMAL /HPF
ERYTHROCYTE [DISTWIDTH] IN BLOOD BY AUTOMATED COUNT: 15.4 % (ref 12.3–15.4)
ERYTHROCYTE [SEDIMENTATION RATE] IN BLOOD BY WESTERGREN METHOD: 10 MM/HR (ref 0–30)
GGT SERPL-CCNC: 22 IU/L (ref 0–65)
GLOBULIN SER-MCNC: 2.6 G/DL (ref 1.5–4.5)
GLUCOSE SERPL-MCNC: 96 MG/DL (ref 65–99)
GLUCOSE UR QL: NEGATIVE
HCT VFR BLD AUTO: 36.9 % (ref 37.5–51)
HDLC SERPL-MCNC: 73 MG/DL
HGB BLD-MCNC: 12.2 G/DL (ref 13–17.7)
HGB UR QL STRIP: NEGATIVE
IMM GRANULOCYTES # BLD: 0 X10E3/UL (ref 0–0.1)
IMM GRANULOCYTES NFR BLD: 0 %
KETONES UR QL STRIP: NEGATIVE
LABCORP COMMENT: NORMAL
LDLC SERPL CALC-MCNC: 89 MG/DL (ref 0–99)
LEUKOCYTE ESTERASE UR QL STRIP: NEGATIVE
LYMPHOCYTES # BLD AUTO: 2 X10E3/UL (ref 0.7–3.1)
LYMPHOCYTES NFR BLD AUTO: 30 %
MAGNESIUM SERPL-MCNC: 1.6 MG/DL (ref 1.6–2.3)
MCH RBC QN AUTO: 28.8 PG (ref 26.6–33)
MCHC RBC AUTO-ENTMCNC: 33.1 G/DL (ref 31.5–35.7)
MCV RBC AUTO: 87 FL (ref 79–97)
MICRO URNS: ABNORMAL
MONOCYTES # BLD AUTO: 0.7 X10E3/UL (ref 0.1–0.9)
MONOCYTES NFR BLD AUTO: 11 %
MUCOUS THREADS URNS QL MICRO: PRESENT
NEUTROPHILS # BLD AUTO: 3.6 X10E3/UL (ref 1.4–7)
NEUTROPHILS NFR BLD AUTO: 53 %
NITRITE UR QL STRIP: NEGATIVE
PH UR STRIP: 5.5 [PH] (ref 5–7.5)
PLATELET # BLD AUTO: 207 X10E3/UL (ref 150–379)
POTASSIUM SERPL-SCNC: 4.6 MMOL/L (ref 3.5–5.2)
PROT SERPL-MCNC: 7 G/DL (ref 6–8.5)
PROT UR QL STRIP: ABNORMAL
RBC # BLD AUTO: 4.23 X10E6/UL (ref 4.14–5.8)
RBC #/AREA URNS HPF: NORMAL /HPF
SL AMB EGFR AFRICAN AMERICAN: 60 ML/MIN/1.73
SL AMB EGFR NON AFRICAN AMERICAN: 52 ML/MIN/1.73
SL AMB URINALYSIS REFLEX: ABNORMAL
SL AMB VLDL CHOLESTEROL CALC: 17 MG/DL (ref 5–40)
SODIUM SERPL-SCNC: 142 MMOL/L (ref 134–144)
SP GR UR: 1.02 (ref 1–1.03)
TRIGL SERPL-MCNC: 85 MG/DL (ref 0–149)
TSH SERPL DL<=0.005 MIU/L-ACNC: 3.59 UIU/ML (ref 0.45–4.5)
UROBILINOGEN UR STRIP-ACNC: 0.2 EU/DL (ref 0.2–1)
WBC # BLD AUTO: 6.6 X10E3/UL (ref 3.4–10.8)
WBC #/AREA URNS HPF: NORMAL /HPF

## 2019-01-28 DIAGNOSIS — K21.9 GASTROESOPHAGEAL REFLUX DISEASE WITHOUT ESOPHAGITIS: ICD-10-CM

## 2019-01-28 RX ORDER — FOLIC ACID 1 MG/1
1000 TABLET ORAL DAILY
Qty: 90 TABLET | Refills: 1 | Status: SHIPPED | OUTPATIENT
Start: 2019-01-28 | End: 2019-09-05 | Stop reason: SDUPTHER

## 2019-01-28 RX ORDER — OMEPRAZOLE 40 MG/1
40 CAPSULE, DELAYED RELEASE ORAL DAILY
Qty: 90 CAPSULE | Refills: 1 | Status: SHIPPED | OUTPATIENT
Start: 2019-01-28 | End: 2019-06-03 | Stop reason: SDUPTHER

## 2019-01-30 ENCOUNTER — OFFICE VISIT (OUTPATIENT)
Dept: INTERNAL MEDICINE CLINIC | Facility: CLINIC | Age: 84
End: 2019-01-30
Payer: COMMERCIAL

## 2019-01-30 VITALS
RESPIRATION RATE: 15 BRPM | HEIGHT: 66 IN | TEMPERATURE: 97.5 F | WEIGHT: 196 LBS | HEART RATE: 61 BPM | SYSTOLIC BLOOD PRESSURE: 160 MMHG | DIASTOLIC BLOOD PRESSURE: 80 MMHG | BODY MASS INDEX: 31.5 KG/M2

## 2019-01-30 DIAGNOSIS — E78.00 PURE HYPERCHOLESTEROLEMIA: ICD-10-CM

## 2019-01-30 DIAGNOSIS — Z86.79 HISTORY OF ABDOMINAL AORTIC ANEURYSM: ICD-10-CM

## 2019-01-30 DIAGNOSIS — I10 ESSENTIAL HYPERTENSION: ICD-10-CM

## 2019-01-30 DIAGNOSIS — I73.9 PERIPHERAL VASCULAR DISEASE (HCC): ICD-10-CM

## 2019-01-30 DIAGNOSIS — E11.9 TYPE 2 DIABETES MELLITUS WITHOUT COMPLICATION, WITHOUT LONG-TERM CURRENT USE OF INSULIN (HCC): Primary | ICD-10-CM

## 2019-01-30 PROCEDURE — 99214 OFFICE O/P EST MOD 30 MIN: CPT | Performed by: INTERNAL MEDICINE

## 2019-01-30 PROCEDURE — 1036F TOBACCO NON-USER: CPT | Performed by: INTERNAL MEDICINE

## 2019-01-30 PROCEDURE — 1160F RVW MEDS BY RX/DR IN RCRD: CPT | Performed by: INTERNAL MEDICINE

## 2019-01-30 RX ORDER — AMLODIPINE BESYLATE 2.5 MG/1
2.5 TABLET ORAL DAILY
Qty: 30 TABLET | Refills: 5 | Status: ON HOLD | OUTPATIENT
Start: 2019-01-30 | End: 2019-07-01

## 2019-01-30 NOTE — ASSESSMENT & PLAN NOTE
No symptoms of claudication had a history of abdominal aortic aneurysm morbidity recent to control the blood pressure better

## 2019-01-30 NOTE — ASSESSMENT & PLAN NOTE
Lab Results   Component Value Date    HGBA1C 5 8 (H) 09/25/2018       No results for input(s): POCGLU in the last 72 hours  Blood Sugar Average: Last 72 hrs:  Diabetes well controlled hemoglobin A1c was 5 8 will repeat that with the next lab work he started on prednisone by the rheumatologist 20 mg tapering by 5 mg every 10-15 days  Blood pressure is a little elevated was elevated the last time he has proteinuria he is already on ramipril 10 mg will going to start him on amlodipine 2 5 mg to augment the blood pressure control
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
## 2019-01-30 NOTE — ASSESSMENT & PLAN NOTE
Not control will start on amlodipine 2 5 mg per day in view of his diabetes and proteinuria continue indapamide 2 5 mg per day bisoprolol 5 mg daily and ramipril 10 mg per day  Will check a microalbumin creatinine ratio with a basic metabolic panel when he comes back    Will add a hemoglobin A1c

## 2019-01-30 NOTE — PATIENT INSTRUCTIONS
Better control the blood pressure will going to do the following  Start the new medication amlodipine 2 5 mg daily  You continue indapamide 2 5 mg per day  Bisoprolol 5 mg daily  Ramipril 10 mg per day  All the other medications the same watch her saltDASH Eating Plan   WHAT YOU NEED TO KNOW:   The DASH (Dietary Approaches to Stop Hypertension) Eating Plan is designed to help prevent or lower high blood pressure  It can also help to lower LDL (bad) cholesterol and decrease your risk of heart disease  The plan is low in sodium, sugar, unhealthy fats, and total fat  It is high in potassium, calcium, magnesium, and fiber  These nutrients are added when you eat more fruits, vegetables, and whole grains  DISCHARGE INSTRUCTIONS:   Your sodium limit each day: Your dietitian will tell you how much sodium is safe for you to have each day  People with high blood pressure should have no more than 1,500 to 2,300 mg of sodium in a day  A teaspoon (tsp) of salt has 2,300 mg of sodium  This may seem like a difficult goal, but small changes to the foods you eat can make a big difference  Your healthcare provider or dietitian can help you create a meal plan that follows your sodium limit  How to limit sodium:   · Read food labels  Food labels can help you choose foods that are low in sodium  The amount of sodium is listed in milligrams (mg)  The % Daily Value (DV) column tells you how much of your daily needs are met by 1 serving of the food for each nutrient listed  Choose foods that have less than 5% of the DV of sodium  These foods are considered low in sodium  Foods that have 20% or more of the DV of sodium are considered high in sodium  Avoid foods that have more than 300 mg of sodium in each serving  Choose foods that say low-sodium, reduced-sodium, or no salt added on the food label  · Avoid salt  Do not salt food at the table, and add very little salt to foods during cooking   Use herbs and spices, such as onions, garlic, and salt-free seasonings to add flavor to foods  Try lemon or lime juice or vinegar to give foods a tart flavor  Use hot peppers or a small amount of hot pepper sauce to add a spicy flavor to foods  · Ask about salt substitutes  Ask your healthcare provider if you may use salt substitutes  Some salt substitutes have ingredients that can be harmful if you have certain health conditions  · Choose foods carefully at restaurants  Meals from restaurants, especially fast food restaurants, are often high in sodium  Some restaurants have nutrition information that tells you the amount of sodium in their foods  Ask to have your food prepared with less, or no salt  What you need to know about fats:   · Include healthy fats  Examples are unsaturated fats and omega-3 fatty acids  Unsaturated fats are found in soybean, canola, olive, or sunflower oil, and liquid and soft tub margarines  Omega-3 fatty acids are found in fatty fish, such as salmon, tuna, mackerel, and sardines  It is also found in flaxseed oil and ground flaxseed  · Avoid unhealthy fats  Do not eat unhealthy fats, such as saturated fats and trans fats  Saturated fats are found in foods that contain fat from animals  Examples are fatty meats, whole milk, butter, cream, and other dairy foods  It is also found in shortening, stick margarine, palm oil, and coconut oil  Trans fats are found in fried foods, crackers, chips, and baked goods made with margarine or shortening  Foods to include: With the DASH eating plan, you need to eat a certain number of servings from each food group  This will help you get enough of certain nutrients and limit others  The amount of servings you should eat depends on how many calories you need  Your dietitian can tell you how many calories you need  The number of servings listed next to the food groups below are for people who need about 2,000 calories each day    · Grains:  6 to 8 servings (3 of these servings should be whole-grain foods)    ¨ 1 slice of whole-grain bread     ¨ 1 ounce of dry cereal    ¨ ½ cup of cooked cereal, pasta, or brown rice    · Vegetables and fruits:  4 to 5 servings of fruits and 4 to 5 servings of vegetables    ¨ 1 medium fruit    ¨ ½ cup of frozen, canned (no added salt), or chopped fresh vegetables     ¨ ½ cup of fresh, frozen, dried, or canned fruit (canned in light syrup or fruit juice)    ¨ ½ cup of vegetable or fruit juice    · Dairy:  2 to 3 servings    ¨ 1 cup of nonfat (skim) or 1% milk    ¨ 1½ ounces of fat-free or low-fat cheese    ¨ 6 ounces of nonfat or low-fat yogurt    · Lean meat, poultry, and fish:  6 ounces or less    [employer] (chicken, turkey) with no skin    ¨ Fish (especially fatty fish, such as salmon, fresh tuna, or mackerel)    ¨ Lean beef and pork (loin, round, extra lean hamburger)    ¨ Egg whites and egg substitutes    · Nuts, seeds, and legumes:  4 to 5 servings each week    ¨ ½ cup of cooked beans and peas    ¨ 1½ ounces of unsalted nuts    ¨ 2 tablespoons of peanut butter or seeds    · Sweets and added sugars:  5 or less each week    ¨ 1 tablespoon of sugar, jelly, or jam    ¨ ½ cup of sorbet or gelatin    ¨ 1 cup of lemonade    · Fats:  2 to 3 servings each week    ¨ 1 teaspoon of soft margarine or vegetable oil    ¨ 1 tablespoon of mayonnaise    ¨ 2 tablespoons of salad dressing  Foods to avoid:   · Grains:      [employer], such as doughnuts, pastries, cookies, and biscuits (high in fat and sugar)    ¨ Mixes for cornbread and biscuits, packaged foods, such as bread stuffing, rice and pasta mixes, macaroni and cheese, and instant cereals (high in sodium)    · Fruits and vegetables:      ¨ Regular, canned vegetables (high in sodium)    ¨ Sauerkraut, pickled vegetables, and other foods prepared in brine (high in sodium)    ¨ Fried vegetables or vegetables in butter or high-fat sauces    ¨ Fruit in cream or butter sauce (high in fat)    · Dairy: ¨ Whole milk, 2% milk, and cream (high in fat)    ¨ Regular cheese and processed cheese (high in fat and sodium)    · Meats and protein foods:      ¨ Smoked or cured meat, such as corned beef, deleon, ham, hot dogs, and sausage (high in fat and sodium)    ¨ Canned beans and canned meats or spreads, such as potted meats, sardines, anchovies, and imitation seafood (high in sodium)    ¨ Deli or lunch meats, such as bologna, ham, turkey, and roast beef (high in sodium)    ¨ High-fat meat (T-bone steak, regular hamburger, and ribs)    ¨ Whole eggs and egg yolks (high in fat)    · Other:      ¨ Seasonings made with salt, such as garlic salt, celery salt, onion salt, seasoned salt, meat tenderizers, and monosodium glutamate (MSG)    ¨ Miso soup and canned or dried soup mixes (high in sodium)    ¨ Regular soy sauce, barbecue sauce, teriyaki sauce, steak sauce, Worcestershire sauce, and most flavored vinegars (high in sodium)    ¨ Regular condiments, such as mustard, ketchup, and salad dressings (high in sodium)    ¨ Gravy and sauces, such as Bob or cheese sauces (high in sodium and fat)    ¨ Drinks high in sugar, such as soda or fruit drinks    [employer] foods, such as salted chips, popcorn, pretzels, pork rinds, salted crackers, and salted nuts    ¨ Frozen foods, such as dinners, entrees, vegetables with sauces, and breaded meats (high in sodium)  Other guidelines to follow:   · Maintain a healthy weight  Your risk for heart disease is higher if you are overweight  Your healthcare provider may suggest that you lose weight if you are overweight  You can lose weight by eating fewer calories and foods that have added sugars and fat  The DASH meal plan can help you do this  Decrease calories by eating smaller portions at each meal and fewer snacks  Ask your healthcare provider for more information about how to lose weight  · Exercise regularly  Regular exercise can help you reach or maintain a healthy weight  Regular exercise can also help decrease your blood pressure and improve your cholesterol levels  Get 30 minutes or more of moderate exercise each day of the week  To lose weight, get at least 60 minutes of exercise  Talk to your healthcare provider about the best exercise program for you  · Limit alcohol  Women should limit alcohol to 1 drink a day  Men should limit alcohol to 2 drinks a day  A drink of alcohol is 12 ounces of beer, 5 ounces of wine, or 1½ ounces of liquor  © 2017 [address] Information is for End User's use only and may not be sold, redistributed or otherwise used for commercial purposes  All illustrations and images included in CareNotes® are the copyrighted property of A D A M , Inc  or [employer]  The above information is an  only  It is not intended as medical advice for individual conditions or treatments  Talk to your doctor, nurse or pharmacist before following any medical regimen to see if it is safe and effective for you

## 2019-02-24 DIAGNOSIS — I10 ESSENTIAL HYPERTENSION: ICD-10-CM

## 2019-02-24 DIAGNOSIS — E78.00 PURE HYPERCHOLESTEROLEMIA: ICD-10-CM

## 2019-02-24 DIAGNOSIS — E55.9 VITAMIN D DEFICIENCY: ICD-10-CM

## 2019-02-24 RX ORDER — BISOPROLOL FUMARATE 5 MG/1
5 TABLET ORAL DAILY
Qty: 90 TABLET | Refills: 0 | Status: SHIPPED | OUTPATIENT
Start: 2019-02-24 | End: 2019-06-03 | Stop reason: SDUPTHER

## 2019-02-24 RX ORDER — ATORVASTATIN CALCIUM 40 MG/1
40 TABLET, FILM COATED ORAL DAILY
Qty: 90 TABLET | Refills: 0 | Status: SHIPPED | OUTPATIENT
Start: 2019-02-24 | End: 2019-06-03 | Stop reason: SDUPTHER

## 2019-02-24 RX ORDER — RAMIPRIL 10 MG/1
CAPSULE ORAL
Qty: 30 CAPSULE | Refills: 6 | Status: ON HOLD | OUTPATIENT
Start: 2019-02-24 | End: 2019-07-02 | Stop reason: SDUPTHER

## 2019-02-24 RX ORDER — BIOTIN 1 MG
TABLET ORAL
Qty: 30 CAPSULE | Refills: 2 | Status: SHIPPED | OUTPATIENT
Start: 2019-02-24 | End: 2019-11-04 | Stop reason: SDUPTHER

## 2019-03-12 ENCOUNTER — TELEPHONE (OUTPATIENT)
Dept: INTERNAL MEDICINE CLINIC | Facility: CLINIC | Age: 84
End: 2019-03-12

## 2019-03-12 NOTE — TELEPHONE ENCOUNTER
The patient's wife called stating that the patient has been in bed for the last 3 days  Running a temperature of 102, deep cough, not eating or drinking much, feeling very run down  Wife concerned that he may be dehydrated  He does not really want to get out of bed  I instructed her to take him to the nearest ER or Urgent Care today  She said she would try to get him out of bed to do so

## 2019-03-13 ENCOUNTER — TELEPHONE (OUTPATIENT)
Dept: INTERNAL MEDICINE CLINIC | Facility: CLINIC | Age: 84
End: 2019-03-13

## 2019-03-13 NOTE — TELEPHONE ENCOUNTER
Wife called to give us an update on Too's condition  He did go to [facility] yesterday  He was diagnosed with Pneumonia  He was given Augmentin 875mg bid for 1 week and an Albuterol inhaler to use  She states that he is doing a little bit better  He has an appointment with us on 3/27  As per Dr Prashant Phan, if she could please call our office on Friday with an update  If he is getting better, he may keep the 3/27 appt  If he is not doing any better we will instruct her on Friday    She understood

## 2019-03-15 ENCOUNTER — TELEPHONE (OUTPATIENT)
Dept: INTERNAL MEDICINE CLINIC | Facility: CLINIC | Age: 84
End: 2019-03-15

## 2019-03-15 NOTE — TELEPHONE ENCOUNTER
Wife called and stated that the patient is feeling much better  He actually ate breakfast today  They will keep the appt on the 27th    She was told to call next week if anything develops

## 2019-03-23 LAB
BUN SERPL-MCNC: 24 MG/DL (ref 8–27)
BUN/CREAT SERPL: 21 (ref 10–24)
CALCIUM SERPL-MCNC: 9.7 MG/DL (ref 8.6–10.2)
CHLORIDE SERPL-SCNC: 104 MMOL/L (ref 96–106)
CO2 SERPL-SCNC: 24 MMOL/L (ref 20–29)
CREAT SERPL-MCNC: 1.14 MG/DL (ref 0.76–1.27)
GLUCOSE SERPL-MCNC: 118 MG/DL (ref 65–99)
HBA1C MFR BLD: 6.4 % (ref 4.8–5.6)
LABCORP COMMENT: NORMAL
MAGNESIUM SERPL-MCNC: 1.6 MG/DL (ref 1.6–2.3)
POTASSIUM SERPL-SCNC: 5.5 MMOL/L (ref 3.5–5.2)
SL AMB EGFR AFRICAN AMERICAN: 68 ML/MIN/1.73
SL AMB EGFR NON AFRICAN AMERICAN: 59 ML/MIN/1.73
SODIUM SERPL-SCNC: 144 MMOL/L (ref 134–144)

## 2019-03-27 ENCOUNTER — OFFICE VISIT (OUTPATIENT)
Dept: INTERNAL MEDICINE CLINIC | Facility: CLINIC | Age: 84
End: 2019-03-27
Payer: COMMERCIAL

## 2019-03-27 VITALS
BODY MASS INDEX: 30.53 KG/M2 | TEMPERATURE: 98.1 F | HEART RATE: 65 BPM | HEIGHT: 66 IN | WEIGHT: 190 LBS | RESPIRATION RATE: 14 BRPM | SYSTOLIC BLOOD PRESSURE: 140 MMHG | DIASTOLIC BLOOD PRESSURE: 80 MMHG

## 2019-03-27 DIAGNOSIS — Z86.79 HISTORY OF ABDOMINAL AORTIC ANEURYSM: ICD-10-CM

## 2019-03-27 DIAGNOSIS — E78.00 PURE HYPERCHOLESTEROLEMIA: ICD-10-CM

## 2019-03-27 DIAGNOSIS — I10 ESSENTIAL HYPERTENSION: ICD-10-CM

## 2019-03-27 DIAGNOSIS — I73.9 PERIPHERAL VASCULAR DISEASE (HCC): ICD-10-CM

## 2019-03-27 DIAGNOSIS — E11.9 TYPE 2 DIABETES MELLITUS WITHOUT COMPLICATION, WITHOUT LONG-TERM CURRENT USE OF INSULIN (HCC): Primary | ICD-10-CM

## 2019-03-27 PROCEDURE — 1160F RVW MEDS BY RX/DR IN RCRD: CPT | Performed by: INTERNAL MEDICINE

## 2019-03-27 PROCEDURE — 1036F TOBACCO NON-USER: CPT | Performed by: INTERNAL MEDICINE

## 2019-03-27 PROCEDURE — 99214 OFFICE O/P EST MOD 30 MIN: CPT | Performed by: INTERNAL MEDICINE

## 2019-03-27 RX ORDER — PREDNISONE 10 MG/1
10 TABLET ORAL DAILY
Start: 2019-03-27 | End: 2019-06-07 | Stop reason: ALTCHOICE

## 2019-03-27 NOTE — PROGRESS NOTES
Assessment/Plan:  Blood pressure stable refer to vascular arterial Doppler and an ultrasound of the aorta order potassium elevated will repeat will see him back in 3 months    Type 2 diabetes mellitus without complication (HCC)  Lab Results   Component Value Date    HGBA1C 6 4 (H) 03/22/2019       No results for input(s): POCGLU in the last 72 hours  Blood Sugar Average: Last 72 hrs: Well control even with the prednisone of 10 mg per day which she had to go up since his pneumonia      Essential hypertension  Fairly well control  Continue same medication he is on indapamide and ramipril and bisoprolol and amlodipine did not make any medication changes I got 140/80    Peripheral vascular disease (HCC)  Decreasing pedal pulses during my foot examination will check an arterial Doppler    History of abdominal aortic aneurysm  Follow-up with vascular doctor lakshmi  is the retiring    Pure hypercholesterolemia  Continue Lipitor 40 mg per day       Diagnoses and all orders for this visit:    Type 2 diabetes mellitus without complication, without long-term current use of insulin ([address] )  -     Basic metabolic panel; Future  -     CBC and differential; Future  -     TSH, 3rd generation with Free T4 reflex; Future  -     predniSONE 10 mg tablet; Take 1 tablet (10 mg total) by mouth daily  -     VAS lower limb arterial duplex, complete bilateral; Future  -     US abdominal aorta; Future  -     Ambulatory referral to Vascular Surgery; Future    Essential hypertension  -     Basic metabolic panel; Future  -     CBC and differential; Future  -     TSH, 3rd generation with Free T4 reflex; Future  -     predniSONE 10 mg tablet; Take 1 tablet (10 mg total) by mouth daily  -     VAS lower limb arterial duplex, complete bilateral; Future  -     US abdominal aorta; Future  -     Ambulatory referral to Vascular Surgery; Future    Pure hypercholesterolemia  -     Basic metabolic panel;  Future  -     CBC and differential; Future  - TSH, 3rd generation with Free T4 reflex; Future  -     predniSONE 10 mg tablet; Take 1 tablet (10 mg total) by mouth daily  -     VAS lower limb arterial duplex, complete bilateral; Future  -     US abdominal aorta; Future  -     Ambulatory referral to Vascular Surgery; Future    History of abdominal aortic aneurysm  -     Basic metabolic panel; Future  -     CBC and differential; Future  -     TSH, 3rd generation with Free T4 reflex; Future  -     predniSONE 10 mg tablet; Take 1 tablet (10 mg total) by mouth daily  -     VAS lower limb arterial duplex, complete bilateral; Future  -     US abdominal aorta; Future  -     Ambulatory referral to Vascular Surgery; Future    Peripheral vascular disease (HCC)  -     Basic metabolic panel; Future  -     CBC and differential; Future  -     TSH, 3rd generation with Free T4 reflex; Future  -     predniSONE 10 mg tablet; Take 1 tablet (10 mg total) by mouth daily  -     VAS lower limb arterial duplex, complete bilateral; Future  -     US abdominal aorta; Future  -     Ambulatory referral to Vascular Surgery; Future          Subjective:      Patient ID: Parminder Fields is a 81 y o  male      Chief Complaint   Patient presents with    Follow-up     Needs foot exam          Current Outpatient Medications:     aspirin 81 MG tablet, Take 1 tablet by mouth daily, Disp: , Rfl:     atorvastatin (LIPITOR) 40 mg tablet, TAKE 1 TABLET (40 MG TOTAL) BY MOUTH DAILY, Disp: 90 tablet, Rfl: 0    bisoprolol (ZEBETA) 5 mg tablet, TAKE 1 TABLET (5 MG TOTAL) BY MOUTH DAILY, Disp: 90 tablet, Rfl: 0    Cholecalciferol (VITAMIN D3) 1000 units CAPS, TAKE 1 CAPSULE MONDAY, WEDNESDAY AND FRIDAY, Disp: 30 capsule, Rfl: 2    ferrous sulfate 325 (65 Fe) mg tablet, Take 1 tablet on Monday, Wednesday & Friday, Disp: 12 tablet, Rfl: 4    folic acid (FOLVITE) 1 mg tablet, Take 1 tablet (1,000 mcg total) by mouth daily, Disp: 90 tablet, Rfl: 1    glucose blood (TRUETRACK TEST) test strip, by In Vitro route 2 (two) times a day, Disp: , Rfl:     indapamide (LOZOL) 2 5 mg tablet, Take 1 tablet (2 5 mg total) by mouth daily, Disp: 100 tablet, Rfl: 0    Lysine HCl 1000 MG TABS, Take by mouth, Disp: , Rfl:     magnesium chloride-calcium (MAG-SR PLUS CALCIUM)  mg, Take 2 tablets by mouth daily, Disp: , Rfl:     multivitamin-minerals (CENTRUM ADULTS) tablet, Take 1 tablet by mouth daily, Disp: , Rfl:     omeprazole (PriLOSEC) 40 MG capsule, Take 1 capsule (40 mg total) by mouth daily for 90 days, Disp: 90 capsule, Rfl: 1    ramipril (ALTACE) 10 MG capsule, take 1 capsule by mouth once daily, Disp: 30 capsule, Rfl: 6    tamsulosin (FLOMAX) 0 4 mg, Take 1 capsule (0 4 mg total) by mouth daily, Disp: 30 capsule, Rfl: 4    VIAGRA 50 MG tablet, Take 50 mg by mouth as needed Take 1 hour before needed, Disp: , Rfl: 0    amLODIPine (NORVASC) 2 5 mg tablet, Take 1 tablet (2 5 mg total) by mouth daily for 30 days, Disp: 30 tablet, Rfl: 5    predniSONE 10 mg tablet, Take 1 tablet (10 mg total) by mouth daily, Disp: , Rfl:     Problem 1  High blood pressure we added indapamide when he came in the last time blood pressure is better controlled  Denies any chest pain palpitation shortness of breath  Indapamide 2 5 ramipril 10 mg he is on bisoprolol 10 mg and indapamide 2 5 mg renal function is stable as well as electrolytes potassium was 5 5 may be a lab issue will repeat the potassium again before the next office visit  Problem 2  Diabetes well controlled hemoglobin A1c 6 4 no polyuria polydipsia continue with behavior modification     Problem 3   Polymyalgia rheumatica is on prednisone now 10 mg per day as follows up with Rheumatology is going to see him soon    Hyperlipidemia on atorvastatin tolerating the medications without any myalgias    History of peripheral vascular disease his vascular surgeon is retiring will refer him to the [facility] an ultrasound of the aorta and an arterial Doppler of the lower extremities has been ordered  The following portions of the patient's history were reviewed and updated as appropriate: allergies, current medications, past family history, past medical history, past social history, past surgical history and problem list     Review of Systems   Constitutional: Negative  Negative for activity change, appetite change, fatigue, fever and unexpected weight change  HENT: Negative for congestion, ear pain, hearing loss, mouth sores, postnasal drip, rhinorrhea, sore throat, trouble swallowing and voice change  Eyes: Negative for pain, redness and visual disturbance  Respiratory: Negative for cough, chest tightness, shortness of breath and wheezing  Cardiovascular: Negative for chest pain, palpitations and leg swelling  Gastrointestinal: Negative for abdominal distention, abdominal pain, blood in stool, constipation, diarrhea and nausea  Endocrine: Negative for cold intolerance, heat intolerance, polydipsia, polyphagia and polyuria  Genitourinary: Negative for difficulty urinating, dysuria, flank pain, frequency, hematuria and urgency  Musculoskeletal: Negative for arthralgias, back pain, gait problem, joint swelling and myalgias  Skin: Negative for color change and pallor  Neurological: Negative for dizziness, tremors, seizures, syncope, weakness, numbness and headaches  Hematological: Negative for adenopathy  Does not bruise/bleed easily  Psychiatric/Behavioral: Negative  Negative for sleep disturbance  The patient is not nervous/anxious  Objective:    Patient's shoes and socks removed  Right Foot/Ankle   Right Foot Inspection  Skin Exam: skin normal skin not intact, no dry skin, no warmth, no callus, no erythema, no maceration, no abnormal color, no pre-ulcer, no ulcer and no callus                          Toe Exam: ROM and strength within normal limitsno swelling, no tenderness, erythema and  no right toe deformity  Sensory   Vibration: absent and intact  Proprioception: intact   Monofilament testing: intact  Vascular    The right DP pulse is 0  Left Foot/Ankle  Left Foot Inspection  Skin Exam: skin normalskin not intact, no dry skin, no warmth, no erythema, no maceration, normal color, no pre-ulcer, no ulcer and no callus                         Toe Exam: ROM and strength within normal limitsno swelling, no tenderness, no erythema and no left toe deformity                   Sensory   Vibration: absent  Proprioception: intact  Monofilament: intact  Vascular    The left DP pulse is 0  Assign Risk Category:  No deformity present; No loss of protective sensation; No weak pulses       Risk: 2      Results for orders placed or performed in visit on 23/89/49   Basic metabolic panel   Result Value Ref Range    Glucose, Random 118 (H) 65 - 99 mg/dL    BUN 24 8 - 27 mg/dL    Creatinine 1 14 0 76 - 1 27 mg/dL    eGFR Non African American 59 (L) >59 mL/min/1 73    eGFR  68 >59 mL/min/1 73    SL AMB BUN/CREATININE RATIO 21 10 - 24    Sodium 144 134 - 144 mmol/L    Potassium 5 5 (H) 3 5 - 5 2 mmol/L    Chloride 104 96 - 106 mmol/L    CO2 24 20 - 29 mmol/L    CALCIUM 9 7 8 6 - 10 2 mg/dL   Hemoglobin A1c (w/out EAG)   Result Value Ref Range    Hemoglobin A1C 6 4 (H) 4 8 - 5 6 %   Magnesium   Result Value Ref Range    Magnesium, Serum 1 6 1 6 - 2 3 mg/dL   Specimen Status Report   Result Value Ref Range    Comment Comment        /80 (BP Location: Left arm, Patient Position: Sitting)   Pulse 65   Temp 98 1 °F (36 7 °C)   Resp 14   Ht 5' 6" (1 676 m)   Wt 86 2 kg (190 lb)   BMI 30 67 kg/m²      Physical Exam   Constitutional: He is oriented to person, place, and time  He appears well-developed and well-nourished  HENT:   Head: Normocephalic  Right Ear: External ear normal    Left Ear: External ear normal    Nose: Nose normal    Mouth/Throat: Oropharynx is clear and moist  No oropharyngeal exudate     Eyes: Pupils are equal, round, and reactive to light  Conjunctivae and EOM are normal    Neck: Normal range of motion  Neck supple  No thyromegaly present  Cardiovascular: Normal rate, regular rhythm, normal heart sounds and intact distal pulses  Exam reveals no gallop and no friction rub  Pulses are no weak pulses  No murmur heard  Pulses:       Dorsalis pedis pulses are 0 on the right side, and 0 on the left side  S1-S2 regular rhythm  Extremities no edema  Blood pressure 140/80 sitting and standing   Pulmonary/Chest: Effort normal and breath sounds normal  No respiratory distress  He has no wheezes  He has no rales  Lungs are clear no wheezing rales or rhonchi   Abdominal: Soft  Bowel sounds are normal  He exhibits no distension and no mass  There is no tenderness  There is no rebound and no guarding  Abdomen obese soft nontender   Musculoskeletal: Normal range of motion  Feet:   Right Foot:   Skin Integrity: Negative for ulcer, skin breakdown, erythema, warmth, callus or dry skin  Left Foot:   Skin Integrity: Negative for ulcer, skin breakdown, erythema, warmth, callus or dry skin  Lymphadenopathy:     He has no cervical adenopathy  Neurological: He is alert and oriented to person, place, and time  Skin: Skin is warm and dry  Psychiatric: He has a normal mood and affect  His behavior is normal  Judgment normal    Nursing note and vitals reviewed

## 2019-03-27 NOTE — LETTER
March 27, 2019     Berna Fontaine MD  [address] [place] [address]    Patient: Lauren Richey   YOB: 1935   Date of Visit: 3/27/2019       Dear Dr Burns :   Sloane Mosquera     If you have questions, please do not hesitate to call me  I look forward to following your patient along with you  Good afternoon his vascular surgeon is retiring at [facility] I refer him to you he is a man that had aortic aneurysm repair years ago and has peripheral vascular disease    Thank you for your attention    Lissett    Sincerely,        Delgado Fisher MD        CC: No Recipients  Delgado Fisher MD  3/27/2019  2:56 PM  Sign at close encounter  Assessment/Plan:  Blood pressure stable refer to vascular arterial Doppler and an ultrasound of the aorta order potassium elevated will repeat will see him back in 3 months    Type 2 diabetes mellitus without complication ([facility] )  Lab Results   Component Value Date    HGBA1C 6 4 (H) 03/22/2019       No results for input(s): POCGLU in the last 72 hours  Blood Sugar Average: Last 72 hrs: Well control even with the prednisone of 10 mg per day which she had to go up since his pneumonia      Essential hypertension  Fairly well control  Continue same medication he is on indapamide and ramipril and bisoprolol and amlodipine did not make any medication changes I got 140/80    Peripheral vascular disease (HCC)  Decreasing pedal pulses during my foot examination will check an arterial Doppler    History of abdominal aortic aneurysm  Follow-up with vascular doctor lakshmi  is the retiring    Pure hypercholesterolemia  Continue Lipitor 40 mg per day       Diagnoses and all orders for this visit:    Type 2 diabetes mellitus without complication, without long-term current use of insulin ([facility] [address] )  -     Basic metabolic panel; Future  -     CBC and differential; Future  -     TSH, 3rd generation with Free T4 reflex; Future  -     predniSONE 10 mg tablet;  Take 1 tablet (10 mg total) by mouth daily  -     VAS lower limb arterial duplex, complete bilateral; Future  -     US abdominal aorta; Future  -     Ambulatory referral to Vascular Surgery; Future    Essential hypertension  -     Basic metabolic panel; Future  -     CBC and differential; Future  -     TSH, 3rd generation with Free T4 reflex; Future  -     predniSONE 10 mg tablet; Take 1 tablet (10 mg total) by mouth daily  -     VAS lower limb arterial duplex, complete bilateral; Future  -     US abdominal aorta; Future  -     Ambulatory referral to Vascular Surgery; Future    Pure hypercholesterolemia  -     Basic metabolic panel; Future  -     CBC and differential; Future  -     TSH, 3rd generation with Free T4 reflex; Future  -     predniSONE 10 mg tablet; Take 1 tablet (10 mg total) by mouth daily  -     VAS lower limb arterial duplex, complete bilateral; Future  -     US abdominal aorta; Future  -     Ambulatory referral to Vascular Surgery; Future    History of abdominal aortic aneurysm  -     Basic metabolic panel; Future  -     CBC and differential; Future  -     TSH, 3rd generation with Free T4 reflex; Future  -     predniSONE 10 mg tablet; Take 1 tablet (10 mg total) by mouth daily  -     VAS lower limb arterial duplex, complete bilateral; Future  -     US abdominal aorta; Future  -     Ambulatory referral to Vascular Surgery; Future    Peripheral vascular disease (HCC)  -     Basic metabolic panel; Future  -     CBC and differential; Future  -     TSH, 3rd generation with Free T4 reflex; Future  -     predniSONE 10 mg tablet; Take 1 tablet (10 mg total) by mouth daily  -     VAS lower limb arterial duplex, complete bilateral; Future  -     US abdominal aorta; Future  -     Ambulatory referral to Vascular Surgery; Future          Subjective:      Patient ID: Isabel Fisher is a 81 y o  male      Chief Complaint   Patient presents with    Follow-up     Needs foot exam          Current Outpatient Medications:     aspirin 81 MG tablet, r/o ectopic pregnancy Take 1 tablet by mouth daily, Disp: , Rfl:     atorvastatin (LIPITOR) 40 mg tablet, TAKE 1 TABLET (40 MG TOTAL) BY MOUTH DAILY, Disp: 90 tablet, Rfl: 0    bisoprolol (ZEBETA) 5 mg tablet, TAKE 1 TABLET (5 MG TOTAL) BY MOUTH DAILY, Disp: 90 tablet, Rfl: 0    Cholecalciferol (VITAMIN D3) 1000 units CAPS, TAKE 1 CAPSULE MONDAY, WEDNESDAY AND FRIDAY, Disp: 30 capsule, Rfl: 2    ferrous sulfate 325 (65 Fe) mg tablet, Take 1 tablet on Monday, Wednesday & Friday, Disp: 12 tablet, Rfl: 4    folic acid (FOLVITE) 1 mg tablet, Take 1 tablet (1,000 mcg total) by mouth daily, Disp: 90 tablet, Rfl: 1    glucose blood (TRUETRACK TEST) test strip, by In Vitro route 2 (two) times a day, Disp: , Rfl:     indapamide (LOZOL) 2 5 mg tablet, Take 1 tablet (2 5 mg total) by mouth daily, Disp: 100 tablet, Rfl: 0    Lysine HCl 1000 MG TABS, Take by mouth, Disp: , Rfl:     magnesium chloride-calcium (MAG-SR PLUS CALCIUM)  mg, Take 2 tablets by mouth daily, Disp: , Rfl:     multivitamin-minerals (CENTRUM ADULTS) tablet, Take 1 tablet by mouth daily, Disp: , Rfl:     omeprazole (PriLOSEC) 40 MG capsule, Take 1 capsule (40 mg total) by mouth daily for 90 days, Disp: 90 capsule, Rfl: 1    ramipril (ALTACE) 10 MG capsule, take 1 capsule by mouth once daily, Disp: 30 capsule, Rfl: 6    tamsulosin (FLOMAX) 0 4 mg, Take 1 capsule (0 4 mg total) by mouth daily, Disp: 30 capsule, Rfl: 4    VIAGRA 50 MG tablet, Take 50 mg by mouth as needed Take 1 hour before needed, Disp: , Rfl: 0    amLODIPine (NORVASC) 2 5 mg tablet, Take 1 tablet (2 5 mg total) by mouth daily for 30 days, Disp: 30 tablet, Rfl: 5    predniSONE 10 mg tablet, Take 1 tablet (10 mg total) by mouth daily, Disp: , Rfl:     Problem 1  High blood pressure we added indapamide when he came in the last time blood pressure is better controlled  Denies any chest pain palpitation shortness of breath    Indapamide 2 5 ramipril 10 mg he is on bisoprolol 10 mg and indapamide 2 5 mg renal function is stable as well as electrolytes potassium was 5 5 may be a lab issue will repeat the potassium again before the next office visit  Problem 2  Diabetes well controlled hemoglobin A1c 6 4 no polyuria polydipsia continue with behavior modification     Problem 3  Polymyalgia rheumatica is on prednisone now 10 mg per day as follows up with Rheumatology is going to see him soon    Hyperlipidemia on atorvastatin tolerating the medications without any myalgias    History of peripheral vascular disease his vascular surgeon is retiring will refer him to the [facility] an ultrasound of the aorta and an arterial Doppler of the lower extremities has been ordered  The following portions of the patient's history were reviewed and updated as appropriate: allergies, current medications, past family history, past medical history, past social history, past surgical history and problem list     Review of Systems   Constitutional: Negative  Negative for activity change, appetite change, fatigue, fever and unexpected weight change  HENT: Negative for congestion, ear pain, hearing loss, mouth sores, postnasal drip, rhinorrhea, sore throat, trouble swallowing and voice change  Eyes: Negative for pain, redness and visual disturbance  Respiratory: Negative for cough, chest tightness, shortness of breath and wheezing  Cardiovascular: Negative for chest pain, palpitations and leg swelling  Gastrointestinal: Negative for abdominal distention, abdominal pain, blood in stool, constipation, diarrhea and nausea  Endocrine: Negative for cold intolerance, heat intolerance, polydipsia, polyphagia and polyuria  Genitourinary: Negative for difficulty urinating, dysuria, flank pain, frequency, hematuria and urgency  Musculoskeletal: Negative for arthralgias, back pain, gait problem, joint swelling and myalgias  Skin: Negative for color change and pallor     Neurological: Negative for dizziness, tremors, seizures, syncope, weakness, numbness and headaches  Hematological: Negative for adenopathy  Does not bruise/bleed easily  Psychiatric/Behavioral: Negative  Negative for sleep disturbance  The patient is not nervous/anxious  Objective:    Patient's shoes and socks removed  Right Foot/Ankle   Right Foot Inspection  Skin Exam: skin normal skin not intact, no dry skin, no warmth, no callus, no erythema, no maceration, no abnormal color, no pre-ulcer, no ulcer and no callus                          Toe Exam: ROM and strength within normal limitsno swelling, no tenderness, erythema and  no right toe deformity  Sensory   Vibration: absent and intact  Proprioception: intact   Monofilament testing: intact  Vascular    The right DP pulse is 0  Left Foot/Ankle  Left Foot Inspection  Skin Exam: skin normalskin not intact, no dry skin, no warmth, no erythema, no maceration, normal color, no pre-ulcer, no ulcer and no callus                         Toe Exam: ROM and strength within normal limitsno swelling, no tenderness, no erythema and no left toe deformity                   Sensory   Vibration: absent  Proprioception: intact  Monofilament: intact  Vascular    The left DP pulse is 0  Assign Risk Category:  No deformity present; No loss of protective sensation;  No weak pulses       Risk: 2      Results for orders placed or performed in visit on 43/78/31   Basic metabolic panel   Result Value Ref Range    Glucose, Random 118 (H) 65 - 99 mg/dL    BUN 24 8 - 27 mg/dL    Creatinine 1 14 0 76 - 1 27 mg/dL    eGFR Non African American 59 (L) >59 mL/min/1 73    eGFR  68 >59 mL/min/1 73    SL AMB BUN/CREATININE RATIO 21 10 - 24    Sodium 144 134 - 144 mmol/L    Potassium 5 5 (H) 3 5 - 5 2 mmol/L    Chloride 104 96 - 106 mmol/L    CO2 24 20 - 29 mmol/L    CALCIUM 9 7 8 6 - 10 2 mg/dL   Hemoglobin A1c (w/out EAG)   Result Value Ref Range    Hemoglobin A1C 6 4 (H) 4 8 - 5 6 %   Magnesium Result Value Ref Range    Magnesium, Serum 1 6 1 6 - 2 3 mg/dL   Specimen Status Report   Result Value Ref Range    Comment Comment        /80 (BP Location: Left arm, Patient Position: Sitting)   Pulse 65   Temp 98 1 °F (36 7 °C)   Resp 14   Ht 5' 6" (1 676 m)   Wt 86 2 kg (190 lb)   BMI 30 67 kg/m²       Physical Exam   Constitutional: He is oriented to person, place, and time  He appears well-developed and well-nourished  HENT:   Head: Normocephalic  Right Ear: External ear normal    Left Ear: External ear normal    Nose: Nose normal    Mouth/Throat: Oropharynx is clear and moist  No oropharyngeal exudate  Eyes: Pupils are equal, round, and reactive to light  Conjunctivae and EOM are normal    Neck: Normal range of motion  Neck supple  No thyromegaly present  Cardiovascular: Normal rate, regular rhythm, normal heart sounds and intact distal pulses  Exam reveals no gallop and no friction rub  Pulses are no weak pulses  No murmur heard  Pulses:       Dorsalis pedis pulses are 0 on the right side, and 0 on the left side  S1-S2 regular rhythm  Extremities no edema  Blood pressure 140/80 sitting and standing   Pulmonary/Chest: Effort normal and breath sounds normal  No respiratory distress  He has no wheezes  He has no rales  Lungs are clear no wheezing rales or rhonchi   Abdominal: Soft  Bowel sounds are normal  He exhibits no distension and no mass  There is no tenderness  There is no rebound and no guarding  Abdomen obese soft nontender   Musculoskeletal: Normal range of motion  Feet:   Right Foot:   Skin Integrity: Negative for ulcer, skin breakdown, erythema, warmth, callus or dry skin  Left Foot:   Skin Integrity: Negative for ulcer, skin breakdown, erythema, warmth, callus or dry skin  Lymphadenopathy:     He has no cervical adenopathy  Neurological: He is alert and oriented to person, place, and time  Skin: Skin is warm and dry     Psychiatric: He has a normal mood and affect  His behavior is normal  Judgment normal    Nursing note and vitals reviewed

## 2019-03-27 NOTE — ASSESSMENT & PLAN NOTE
Lab Results   Component Value Date    HGBA1C 6 4 (H) 03/22/2019       No results for input(s): POCGLU in the last 72 hours  Blood Sugar Average: Last 72 hrs:   Well control even with the prednisone of 10 mg per day which she had to go up since his pneumonia

## 2019-03-27 NOTE — ASSESSMENT & PLAN NOTE
Fairly well control    Continue same medication he is on indapamide and ramipril and bisoprolol and amlodipine did not make any medication changes I got 140/80

## 2019-03-27 NOTE — PATIENT INSTRUCTIONS
Make an appointment with the vascular surgeons at [facility]  Will order an ultrasound of the aorta and an ultrasound on your circulation in lower extremities  Your blood pressure is control  Will check her labs to check her thyroid function as well as your potassium  Will see her back in a few months

## 2019-04-03 ENCOUNTER — HOSPITAL ENCOUNTER (OUTPATIENT)
Dept: ULTRASOUND IMAGING | Facility: HOSPITAL | Age: 84
Discharge: HOME | End: 2019-04-03
Attending: INTERNAL MEDICINE

## 2019-04-03 DIAGNOSIS — I73.9 PERIPHERAL VASCULAR DISEASE (HCC): ICD-10-CM

## 2019-04-03 DIAGNOSIS — Z86.79 HISTORY OF ABDOMINAL AORTIC ANEURYSM: Primary | ICD-10-CM

## 2019-04-03 DIAGNOSIS — I10 ESSENTIAL HYPERTENSION: ICD-10-CM

## 2019-04-03 DIAGNOSIS — Z86.79 HISTORY OF ABDOMINAL AORTIC ANEURYSM: ICD-10-CM

## 2019-04-03 DIAGNOSIS — E11.9 TYPE 2 DIABETES MELLITUS WITHOUT COMPLICATION, WITHOUT LONG-TERM CURRENT USE OF INSULIN (HCC): ICD-10-CM

## 2019-04-03 DIAGNOSIS — E78.00 PURE HYPERCHOLESTEROLEMIA: ICD-10-CM

## 2019-04-18 ENCOUNTER — HOSPITAL ENCOUNTER (OUTPATIENT)
Dept: NON INVASIVE DIAGNOSTICS | Facility: CLINIC | Age: 84
Discharge: HOME | End: 2019-04-18
Payer: COMMERCIAL

## 2019-04-18 DIAGNOSIS — Z86.79 HISTORY OF ABDOMINAL AORTIC ANEURYSM: ICD-10-CM

## 2019-04-18 DIAGNOSIS — E11.9 TYPE 2 DIABETES MELLITUS WITHOUT COMPLICATION, WITHOUT LONG-TERM CURRENT USE OF INSULIN (HCC): ICD-10-CM

## 2019-04-18 DIAGNOSIS — I73.9 PERIPHERAL VASCULAR DISEASE (HCC): ICD-10-CM

## 2019-04-18 DIAGNOSIS — E78.00 PURE HYPERCHOLESTEROLEMIA: ICD-10-CM

## 2019-04-18 DIAGNOSIS — I10 ESSENTIAL HYPERTENSION: ICD-10-CM

## 2019-04-18 PROCEDURE — 93925 LOWER EXTREMITY STUDY: CPT

## 2019-04-18 PROCEDURE — 93923 UPR/LXTR ART STDY 3+ LVLS: CPT

## 2019-04-18 PROCEDURE — 93922 UPR/L XTREMITY ART 2 LEVELS: CPT | Performed by: SURGERY

## 2019-04-18 PROCEDURE — 93978 VASCULAR STUDY: CPT

## 2019-04-18 PROCEDURE — 93925 LOWER EXTREMITY STUDY: CPT | Performed by: SURGERY

## 2019-04-18 PROCEDURE — 93978 VASCULAR STUDY: CPT | Performed by: SURGERY

## 2019-04-19 ENCOUNTER — TELEPHONE (OUTPATIENT)
Dept: INTERNAL MEDICINE CLINIC | Facility: CLINIC | Age: 84
End: 2019-04-19

## 2019-04-24 DIAGNOSIS — I10 ESSENTIAL HYPERTENSION: ICD-10-CM

## 2019-04-24 RX ORDER — INDAPAMIDE 2.5 MG/1
TABLET, FILM COATED ORAL
Qty: 100 TABLET | Refills: 0 | Status: ON HOLD | OUTPATIENT
Start: 2019-04-24 | End: 2019-07-02 | Stop reason: SDUPTHER

## 2019-05-02 ENCOUNTER — CONSULT (OUTPATIENT)
Dept: VASCULAR SURGERY | Facility: CLINIC | Age: 84
End: 2019-05-02
Payer: COMMERCIAL

## 2019-05-02 VITALS — DIASTOLIC BLOOD PRESSURE: 80 MMHG | TEMPERATURE: 98.4 F | SYSTOLIC BLOOD PRESSURE: 134 MMHG | HEART RATE: 70 BPM

## 2019-05-02 DIAGNOSIS — Z86.79 HISTORY OF ABDOMINAL AORTIC ANEURYSM: Primary | ICD-10-CM

## 2019-05-02 DIAGNOSIS — I10 ESSENTIAL HYPERTENSION: ICD-10-CM

## 2019-05-02 DIAGNOSIS — E78.00 PURE HYPERCHOLESTEROLEMIA: ICD-10-CM

## 2019-05-02 DIAGNOSIS — I73.9 PERIPHERAL VASCULAR DISEASE (HCC): ICD-10-CM

## 2019-05-02 DIAGNOSIS — E11.9 TYPE 2 DIABETES MELLITUS WITHOUT COMPLICATION, WITHOUT LONG-TERM CURRENT USE OF INSULIN (HCC): ICD-10-CM

## 2019-05-02 PROCEDURE — 99214 OFFICE O/P EST MOD 30 MIN: CPT | Performed by: NURSE PRACTITIONER

## 2019-05-09 RX ORDER — SODIUM CHLORIDE 9 MG/ML
258.6 INJECTION, SOLUTION INTRAVENOUS ONCE
Status: COMPLETED | OUTPATIENT
Start: 2019-05-10 | End: 2019-05-10

## 2019-05-09 RX ORDER — SODIUM CHLORIDE 9 MG/ML
20 INJECTION, SOLUTION INTRAVENOUS ONCE
Status: DISCONTINUED | OUTPATIENT
Start: 2019-05-10 | End: 2019-05-14 | Stop reason: HOSPADM

## 2019-05-09 RX ORDER — SODIUM CHLORIDE 9 MG/ML
107.75 INJECTION, SOLUTION INTRAVENOUS CONTINUOUS
Status: DISCONTINUED | OUTPATIENT
Start: 2019-05-10 | End: 2019-05-10 | Stop reason: HOSPADM

## 2019-05-10 ENCOUNTER — HOSPITAL ENCOUNTER (OUTPATIENT)
Dept: CT IMAGING | Facility: HOSPITAL | Age: 84
Discharge: HOME | End: 2019-05-10
Payer: COMMERCIAL

## 2019-05-10 ENCOUNTER — HOSPITAL ENCOUNTER (OUTPATIENT)
Dept: INFUSION CENTER | Facility: HOSPITAL | Age: 84
Discharge: HOME | End: 2019-05-10
Payer: COMMERCIAL

## 2019-05-10 VITALS
WEIGHT: 191.36 LBS | SYSTOLIC BLOOD PRESSURE: 156 MMHG | HEIGHT: 66 IN | TEMPERATURE: 98.6 F | HEART RATE: 88 BPM | BODY MASS INDEX: 30.75 KG/M2 | RESPIRATION RATE: 18 BRPM | DIASTOLIC BLOOD PRESSURE: 75 MMHG

## 2019-05-10 DIAGNOSIS — Z86.79 HISTORY OF ABDOMINAL AORTIC ANEURYSM: ICD-10-CM

## 2019-05-10 PROCEDURE — 74174 CTA ABD&PLVS W/CONTRAST: CPT

## 2019-05-10 RX ADMIN — SODIUM CHLORIDE 258.6 ML/HR: 9 INJECTION, SOLUTION INTRAVENOUS at 08:30

## 2019-05-10 RX ADMIN — IOHEXOL 100 ML: 350 INJECTION, SOLUTION INTRAVENOUS at 10:08

## 2019-05-10 RX ADMIN — SODIUM CHLORIDE 107.75 ML/HR: 9 INJECTION, SOLUTION INTRAVENOUS at 10:23

## 2019-05-10 NOTE — PROGRESS NOTES
Pt hydrated x 1 hour pre ct scan  #20 jelco left in right ac for xray dept  Pt taken by volunteer to xray waiting room

## 2019-05-13 NOTE — ASSESSMENT & PLAN NOTE
Being followed by vascular diminished pedal pulses I did a foot examination today he also had a history of abdominal aortic aneurysm that was resected stent caffeine

## 2019-05-14 ENCOUNTER — TELEPHONE (OUTPATIENT)
Dept: VASCULAR SURGERY | Facility: CLINIC | Age: 84
End: 2019-05-14

## 2019-05-16 ENCOUNTER — HOSPITAL ENCOUNTER (OUTPATIENT)
Dept: NON INVASIVE DIAGNOSTICS | Facility: CLINIC | Age: 84
Discharge: HOME | End: 2019-05-16
Payer: COMMERCIAL

## 2019-05-16 DIAGNOSIS — I10 ESSENTIAL HYPERTENSION: ICD-10-CM

## 2019-05-16 DIAGNOSIS — E78.00 PURE HYPERCHOLESTEROLEMIA: ICD-10-CM

## 2019-05-16 DIAGNOSIS — I73.9 PERIPHERAL VASCULAR DISEASE (HCC): ICD-10-CM

## 2019-05-16 PROCEDURE — 93880 EXTRACRANIAL BILAT STUDY: CPT | Performed by: SURGERY

## 2019-05-16 PROCEDURE — 93880 EXTRACRANIAL BILAT STUDY: CPT

## 2019-06-03 DIAGNOSIS — K21.9 GASTROESOPHAGEAL REFLUX DISEASE WITHOUT ESOPHAGITIS: ICD-10-CM

## 2019-06-03 DIAGNOSIS — E78.00 PURE HYPERCHOLESTEROLEMIA: ICD-10-CM

## 2019-06-03 DIAGNOSIS — I10 ESSENTIAL HYPERTENSION: ICD-10-CM

## 2019-06-03 DIAGNOSIS — N40.1 BENIGN PROSTATIC HYPERPLASIA WITH URINARY FREQUENCY: ICD-10-CM

## 2019-06-03 DIAGNOSIS — R35.0 BENIGN PROSTATIC HYPERPLASIA WITH URINARY FREQUENCY: ICD-10-CM

## 2019-06-03 RX ORDER — OMEPRAZOLE 40 MG/1
CAPSULE, DELAYED RELEASE ORAL
Qty: 90 CAPSULE | Refills: 1 | Status: SHIPPED | OUTPATIENT
Start: 2019-06-03 | End: 2020-02-04

## 2019-06-03 RX ORDER — ATORVASTATIN CALCIUM 40 MG/1
40 TABLET, FILM COATED ORAL DAILY
Qty: 90 TABLET | Refills: 0 | Status: SHIPPED | OUTPATIENT
Start: 2019-06-03 | End: 2019-09-05 | Stop reason: SDUPTHER

## 2019-06-03 RX ORDER — BISOPROLOL FUMARATE 5 MG/1
TABLET ORAL
Qty: 90 TABLET | Refills: 0 | Status: SHIPPED | OUTPATIENT
Start: 2019-06-03 | End: 2019-09-05 | Stop reason: SDUPTHER

## 2019-06-03 RX ORDER — TAMSULOSIN HYDROCHLORIDE 0.4 MG/1
CAPSULE ORAL
Qty: 30 CAPSULE | Refills: 4 | Status: SHIPPED | OUTPATIENT
Start: 2019-06-03 | End: 2019-11-04 | Stop reason: SDUPTHER

## 2019-06-06 ENCOUNTER — DOCUMENTATION (OUTPATIENT)
Dept: VASCULAR SURGERY | Facility: CLINIC | Age: 84
End: 2019-06-06

## 2019-06-06 ENCOUNTER — OFFICE VISIT (OUTPATIENT)
Dept: VASCULAR SURGERY | Facility: CLINIC | Age: 84
End: 2019-06-06
Payer: COMMERCIAL

## 2019-06-06 ENCOUNTER — TELEPHONE (OUTPATIENT)
Dept: ADMINISTRATIVE | Facility: HOSPITAL | Age: 84
End: 2019-06-06

## 2019-06-06 VITALS
HEIGHT: 66 IN | HEART RATE: 63 BPM | DIASTOLIC BLOOD PRESSURE: 72 MMHG | WEIGHT: 193 LBS | SYSTOLIC BLOOD PRESSURE: 130 MMHG | BODY MASS INDEX: 31.02 KG/M2

## 2019-06-06 DIAGNOSIS — IMO0001 ENDOLEAK POST (EVAR) ENDOVASCULAR ANEURYSM REPAIR, INITIAL ENCOUNTER: Chronic | ICD-10-CM

## 2019-06-06 DIAGNOSIS — I71.4 AAA (ABDOMINAL AORTIC ANEURYSM) WITHOUT RUPTURE (HCC): Primary | Chronic | ICD-10-CM

## 2019-06-06 PROBLEM — I71.40 AAA (ABDOMINAL AORTIC ANEURYSM) WITHOUT RUPTURE: Chronic | Status: ACTIVE | Noted: 2018-02-21

## 2019-06-06 PROCEDURE — 99215 OFFICE O/P EST HI 40 MIN: CPT | Performed by: SURGERY

## 2019-06-06 RX ORDER — CEFAZOLIN SODIUM 2 G/50ML
2000 SOLUTION INTRAVENOUS ONCE
Status: CANCELLED | OUTPATIENT
Start: 2019-06-06 | End: 2019-06-06

## 2019-06-06 NOTE — H&P (VIEW-ONLY)
Assessment/Plan:    Endoleak post (EVAR) endovascular aneurysm repair, initial encounter [facility])  History of abdominal aortic aneurysm status post endovascular repair with AneuRx stent graft placed 2005  This graft has now migrated with marked increase in aortic sac size  We discussed at length the findings on CT angiogram along with the pathophysiology of aneurysmal growth and risk of rupture  We discussed the treatment options available and will plan on proceeding with revision aortic stent graft with cuff extension following cardiac clearance  Diagnoses and all orders for this visit:    AAA (abdominal aortic aneurysm) without rupture ([facility] [address] )    Endoleak post (EVAR) endovascular aneurysm repair, initial encounter ([facility] [address] )          Subjective:      Patient ID: Casey Patel is a 81 y o  male  Pt is here to review results of CTA ABD/Pelvis done 5/10/2019 and a CV done 5/16/2019  Pt has a hx of EVAR done 2007 by Dr Kyree Braden  Pt denies any abd pain or any abd pain after eating  He denies any low back pain  Pt also denies sudden loss of vision, headaches, dizziness,problems swallowing TIA or stroke symptoms  Pt is currently taking ASA 81 mg and Atorvastatin 40 mg  He is a former smoker  81-year-old with history of aortic aneurysm repair in 2005 at [facility] by Dr Kyree Braden  He now presents for follow-up of this aneurysm repair  On evaluation today he has no complaints other than some chronic back pain  He specifically denies chest pain, shortness of breath or claudication symptoms  He denies abdominal pain  Multiple CT scans are reviewed to include the most recent study 05/10/2019 which shows marked expansion of his aortic sac from 5 6 centimeters on 06/18/2016 to 6 7 centimeters on current CT scan  On extensive review of the scan there is migration of the stent graft into the aortic sac  There is approximately 4 centimeters from the lowest renal artery to the flow divider        The following portions of the patient's history were reviewed and updated as appropriate: allergies, current medications, past family history, past medical history, past social history, past surgical history and problem list     The ROS as maria c was reviewed and changes made as indictated       Review of Systems   Constitutional: Negative  HENT: Negative  Eyes: Negative  Respiratory: Negative  Cardiovascular: Negative  Gastrointestinal: Negative  Endocrine: Negative  Genitourinary: Negative  Musculoskeletal: Positive for arthralgias and back pain  Skin: Negative  Allergic/Immunologic: Negative  Neurological: Negative  Hematological: Bruises/bleeds easily  Psychiatric/Behavioral: Negative  Objective:      /72 (BP Location: Left arm, Patient Position: Sitting, Cuff Size: Standard)   Pulse 63   Ht 5' 6" (1 676 m)   Wt 87 5 kg (193 lb)   BMI 31 15 kg/m²          Physical Exam   Constitutional: He is oriented to person, place, and time  He appears well-developed and well-nourished  HENT:   Head: Normocephalic and atraumatic  Eyes: Conjunctivae and EOM are normal    Neck: Normal range of motion  Neck supple  No JVD present  Carotid bruit is not present  Cardiovascular: Normal rate, regular rhythm, S1 normal, S2 normal and normal heart sounds  No murmur heard  Pulses:       Carotid pulses are 2+ on the right side, and 2+ on the left side  Radial pulses are 2+ on the right side, and 2+ on the left side  Femoral pulses are 2+ on the right side, and 2+ on the left side  Popliteal pulses are 2+ on the right side, and 2+ on the left side  Dorsalis pedis pulses are 2+ on the right side, and 2+ on the left side  Posterior tibial pulses are 2+ on the right side, and 2+ on the left side  Pulmonary/Chest: Effort normal and breath sounds normal    Abdominal: Soft  Normal appearance   He exhibits pulsatile midline mass (Pulsatile midline mass consistent with aortic aneurysm with endoleak)  He exhibits no abdominal bruit  There is no tenderness  No hernia  Musculoskeletal: Normal range of motion  He exhibits no edema, tenderness or deformity  Neurological: He is alert and oriented to person, place, and time  He has normal strength  No sensory deficit  Skin: Skin is warm, dry and intact  No pallor  Psychiatric: He has a normal mood and affect   His speech is normal and behavior is normal        Operative Scheduling Information:    Hospital:  [facility]    Physician:  [address] [place] [address]    Surgery:  Revision EVAR with aortic cuff placement    Urgency:  Standard    Case Length:  Normal    Post-op Bed:  Stepdown    OR Table:  Discovery    Equipment Needs:  Rep:  Wing Anderson RepJimy Dojulian    Medication Instructions:  Aspirin:   Continue (do not hold)    Hydration:  No

## 2019-06-06 NOTE — TELEPHONE ENCOUNTER
Patient was seen today by Dr Vanessa Severin and needs cardiac clearance for Revise EVAR with Aortic  Patient is scheduled on 06/07/19 with Dr Micaela Avila aat the [facility]  Form were [email or web address]

## 2019-06-07 ENCOUNTER — CONSULT (OUTPATIENT)
Dept: CARDIOLOGY CLINIC | Facility: CLINIC | Age: 84
End: 2019-06-07
Payer: COMMERCIAL

## 2019-06-07 VITALS
DIASTOLIC BLOOD PRESSURE: 80 MMHG | BODY MASS INDEX: 31.12 KG/M2 | SYSTOLIC BLOOD PRESSURE: 150 MMHG | HEIGHT: 66 IN | WEIGHT: 193.6 LBS | HEART RATE: 58 BPM

## 2019-06-07 DIAGNOSIS — I10 ESSENTIAL HYPERTENSION: ICD-10-CM

## 2019-06-07 DIAGNOSIS — E78.00 PURE HYPERCHOLESTEROLEMIA: ICD-10-CM

## 2019-06-07 DIAGNOSIS — Z01.810 PREOP CARDIOVASCULAR EXAM: Primary | ICD-10-CM

## 2019-06-07 DIAGNOSIS — IMO0001 ENDOLEAK POST (EVAR) ENDOVASCULAR ANEURYSM REPAIR, INITIAL ENCOUNTER: Chronic | ICD-10-CM

## 2019-06-07 PROBLEM — I25.2 HISTORY OF ACUTE INFERIOR WALL MI: Status: ACTIVE | Noted: 2019-06-07

## 2019-06-07 PROCEDURE — 99214 OFFICE O/P EST MOD 30 MIN: CPT | Performed by: INTERNAL MEDICINE

## 2019-06-07 PROCEDURE — 93000 ELECTROCARDIOGRAM COMPLETE: CPT | Performed by: INTERNAL MEDICINE

## 2019-06-10 DIAGNOSIS — I71.4 AAA (ABDOMINAL AORTIC ANEURYSM) WITHOUT RUPTURE (HCC): Primary | Chronic | ICD-10-CM

## 2019-06-12 ENCOUNTER — TELEPHONE (OUTPATIENT)
Dept: VASCULAR SURGERY | Facility: CLINIC | Age: 84
End: 2019-06-12

## 2019-06-25 ENCOUNTER — PREP FOR PROCEDURE (OUTPATIENT)
Dept: VASCULAR SURGERY | Facility: CLINIC | Age: 84
End: 2019-06-25

## 2019-06-25 ENCOUNTER — APPOINTMENT (OUTPATIENT)
Dept: LAB | Facility: HOSPITAL | Age: 84
DRG: 269 | End: 2019-06-25
Attending: SURGERY
Payer: COMMERCIAL

## 2019-06-25 DIAGNOSIS — IMO0001 ENDOLEAK POST (EVAR) ENDOVASCULAR ANEURYSM REPAIR, INITIAL ENCOUNTER: Chronic | ICD-10-CM

## 2019-06-25 DIAGNOSIS — I71.4 AAA (ABDOMINAL AORTIC ANEURYSM) WITHOUT RUPTURE (HCC): Chronic | ICD-10-CM

## 2019-06-25 LAB
ABO GROUP BLD: NORMAL
ANION GAP SERPL CALCULATED.3IONS-SCNC: 6 MMOL/L (ref 4–13)
BLD GP AB SCN SERPL QL: NEGATIVE
BUN SERPL-MCNC: 24 MG/DL (ref 5–25)
CALCIUM SERPL-MCNC: 9.1 MG/DL (ref 8.3–10.1)
CHLORIDE SERPL-SCNC: 106 MMOL/L (ref 100–108)
CO2 SERPL-SCNC: 27 MMOL/L (ref 21–32)
CREAT SERPL-MCNC: 1.16 MG/DL (ref 0.6–1.3)
ERYTHROCYTE [DISTWIDTH] IN BLOOD BY AUTOMATED COUNT: 14.7 % (ref 11.6–15.1)
EST. AVERAGE GLUCOSE BLD GHB EST-MCNC: 123 MG/DL
GFR SERPL CREATININE-BSD FRML MDRD: 58 ML/MIN/1.73SQ M
GLUCOSE P FAST SERPL-MCNC: 116 MG/DL (ref 65–99)
HBA1C MFR BLD: 5.9 % (ref 4.2–6.3)
HCT VFR BLD AUTO: 34.6 % (ref 36.5–49.3)
HGB BLD-MCNC: 11.3 G/DL (ref 12–17)
INR PPP: 1.1 (ref 0.84–1.19)
MCH RBC QN AUTO: 30.3 PG (ref 26.8–34.3)
MCHC RBC AUTO-ENTMCNC: 32.7 G/DL (ref 31.4–37.4)
MCV RBC AUTO: 93 FL (ref 82–98)
PLATELET # BLD AUTO: 190 THOUSANDS/UL (ref 149–390)
PMV BLD AUTO: 11 FL (ref 8.9–12.7)
POTASSIUM SERPL-SCNC: 4.4 MMOL/L (ref 3.5–5.3)
PROTHROMBIN TIME: 13.8 SECONDS (ref 11.6–14.5)
RBC # BLD AUTO: 3.73 MILLION/UL (ref 3.88–5.62)
RH BLD: NEGATIVE
SODIUM SERPL-SCNC: 139 MMOL/L (ref 136–145)
SPECIMEN EXPIRATION DATE: NORMAL
WBC # BLD AUTO: 5.69 THOUSAND/UL (ref 4.31–10.16)

## 2019-06-25 PROCEDURE — 86850 RBC ANTIBODY SCREEN: CPT

## 2019-06-25 PROCEDURE — 83036 HEMOGLOBIN GLYCOSYLATED A1C: CPT

## 2019-06-25 PROCEDURE — 85027 COMPLETE CBC AUTOMATED: CPT

## 2019-06-25 PROCEDURE — 36415 COLL VENOUS BLD VENIPUNCTURE: CPT

## 2019-06-25 PROCEDURE — 86901 BLOOD TYPING SEROLOGIC RH(D): CPT

## 2019-06-25 PROCEDURE — 85610 PROTHROMBIN TIME: CPT

## 2019-06-25 PROCEDURE — 80048 BASIC METABOLIC PNL TOTAL CA: CPT

## 2019-06-25 PROCEDURE — 86900 BLOOD TYPING SEROLOGIC ABO: CPT

## 2019-06-27 ENCOUNTER — ANESTHESIA EVENT (INPATIENT)
Dept: PERIOP | Facility: HOSPITAL | Age: 84
DRG: 269 | End: 2019-06-27
Payer: COMMERCIAL

## 2019-06-28 NOTE — ANESTHESIA PREPROCEDURE EVALUATION
Review of Systems/Medical History  Patient summary reviewed  Chart reviewed  History of anesthetic complications PONV    Cardiovascular  Hyperlipidemia, Hypertension , Past MI , CAD ,   Comment: REMOTE MI, FULLY INDEPENDENT ADLs; NO ANGINA, SOB,  Pulmonary  No asthma , No recent URI ,   Comment: SINUS DRAINAGE     GI/Hepatic     Hiatal hernia,             Endo/Other  Diabetes ,      GYN       Hematology  Anemia ,     Musculoskeletal    Comment: PREDNISONE 1 MG DAILY FOR PMR; [facility] Arthritis     Neurology   Psychology         Lab Results   Component Value Date    WBC 5 69 06/25/2019    HGB 11 3 (L) 06/25/2019     06/25/2019     Lab Results   Component Value Date    SODIUM 139 06/25/2019    K 4 4 06/25/2019    BUN 24 06/25/2019    CREATININE 1 16 06/25/2019    GLUCOSE 104 (H) 11/14/2017     No results found for: PTT   Lab Results   Component Value Date    INR 1 10 06/25/2019       Blood type A    Lab Results   Component Value Date    HGBA1C 5 9 06/25/2019         Physical Exam    Airway    Mallampati score: II  TM Distance: >3 FB       Dental       Cardiovascular      Pulmonary      Other Findings        Anesthesia Plan  ASA Score- 3     Anesthesia Type- general with ASA Monitors  Additional Monitors: arterial line  Airway Plan: ETT  Comment: Needs B-blocker  MAP targeting 70s or greater per surgeon need for DEBORA prevention  1L NS fluid bolus pre-dye load  Discussed risk of POCD    Slime Chan MD  June 28, 2019  9:15 AM          Plan Factors- Patient instructed to abstain from smoking on day of procedure  Patient did not smoke on day of surgery  Induction- intravenous  Postoperative Plan- Plan for postoperative opioid use  Planned trial extubation    Informed Consent- Anesthetic plan and risks discussed with patient  I personally reviewed this patient with the CRNA  Discussed and agreed on the Anesthesia Plan with the CRNA  Hemanth Walsh

## 2019-07-01 ENCOUNTER — APPOINTMENT (OUTPATIENT)
Dept: RADIOLOGY | Facility: HOSPITAL | Age: 84
DRG: 269 | End: 2019-07-01
Attending: SURGERY
Payer: COMMERCIAL

## 2019-07-01 ENCOUNTER — HOSPITAL ENCOUNTER (INPATIENT)
Facility: HOSPITAL | Age: 84
LOS: 1 days | Discharge: HOME | DRG: 269 | End: 2019-07-02
Attending: SURGERY | Admitting: SURGERY
Payer: COMMERCIAL

## 2019-07-01 ENCOUNTER — ANESTHESIA (INPATIENT)
Dept: PERIOP | Facility: HOSPITAL | Age: 84
DRG: 269 | End: 2019-07-01
Payer: COMMERCIAL

## 2019-07-01 DIAGNOSIS — I71.4 AAA (ABDOMINAL AORTIC ANEURYSM) WITHOUT RUPTURE (HCC): Primary | ICD-10-CM

## 2019-07-01 DIAGNOSIS — IMO0001 ENDOLEAK POST (EVAR) ENDOVASCULAR ANEURYSM REPAIR, INITIAL ENCOUNTER: ICD-10-CM

## 2019-07-01 DIAGNOSIS — I10 ESSENTIAL HYPERTENSION: ICD-10-CM

## 2019-07-01 LAB
GLUCOSE SERPL-MCNC: 115 MG/DL (ref 65–140)
GLUCOSE SERPL-MCNC: 145 MG/DL (ref 65–140)
KCT BLD-ACNC: 172 SEC (ref 89–137)
KCT BLD-ACNC: 177 SEC (ref 89–137)
KCT BLD-ACNC: 189 SEC (ref 89–137)
KCT BLD-ACNC: 206 SEC (ref 89–137)
KCT BLD-ACNC: 212 SEC (ref 89–137)
SPECIMEN SOURCE: ABNORMAL

## 2019-07-01 PROCEDURE — C1769 GUIDE WIRE: HCPCS | Performed by: SURGERY

## 2019-07-01 PROCEDURE — 047934Z DILATION OF RIGHT RENAL ARTERY WITH DRUG-ELUTING INTRALUMINAL DEVICE, PERCUTANEOUS APPROACH: ICD-10-PCS | Performed by: SURGERY

## 2019-07-01 PROCEDURE — 04H93DZ INSERTION OF INTRALUMINAL DEVICE INTO RIGHT RENAL ARTERY, PERCUTANEOUS APPROACH: ICD-10-PCS | Performed by: SURGERY

## 2019-07-01 PROCEDURE — C1874 STENT, COATED/COV W/DEL SYS: HCPCS | Performed by: SURGERY

## 2019-07-01 PROCEDURE — 04703Z6: ICD-10-PCS | Performed by: SURGERY

## 2019-07-01 PROCEDURE — 85347 COAGULATION TIME ACTIVATED: CPT

## 2019-07-01 PROCEDURE — 34710 DLYD PLMT XTN PROSTH 1ST VSL: CPT | Performed by: SURGERY

## 2019-07-01 PROCEDURE — 34713 PERQ ACCESS & CLSR FEM ART: CPT | Performed by: RADIOLOGY

## 2019-07-01 PROCEDURE — C1894 INTRO/SHEATH, NON-LASER: HCPCS | Performed by: SURGERY

## 2019-07-01 PROCEDURE — 04V03D6 RESTRICT ABD AORTA, BIFURC, W INTRALUM DEV, PERC: ICD-10-PCS | Performed by: SURGERY

## 2019-07-01 PROCEDURE — 04V03DZ RESTRICTION OF ABDOMINAL AORTA WITH INTRALUMINAL DEVICE, PERCUTANEOUS APPROACH: ICD-10-PCS | Performed by: SURGERY

## 2019-07-01 PROCEDURE — 37236 OPEN/PERQ PLACE STENT 1ST: CPT | Performed by: RADIOLOGY

## 2019-07-01 PROCEDURE — 76937 US GUIDE VASCULAR ACCESS: CPT

## 2019-07-01 PROCEDURE — 37236 OPEN/PERQ PLACE STENT 1ST: CPT | Performed by: SURGERY

## 2019-07-01 PROCEDURE — 99222 1ST HOSP IP/OBS MODERATE 55: CPT | Performed by: INTERNAL MEDICINE

## 2019-07-01 PROCEDURE — C1760 CLOSURE DEV, VASC: HCPCS | Performed by: SURGERY

## 2019-07-01 PROCEDURE — 34710 DLYD PLMT XTN PROSTH 1ST VSL: CPT | Performed by: RADIOLOGY

## 2019-07-01 PROCEDURE — 34713 PERQ ACCESS & CLSR FEM ART: CPT | Performed by: SURGERY

## 2019-07-01 PROCEDURE — 36245 INS CATH ABD/L-EXT ART 1ST: CPT | Performed by: SURGERY

## 2019-07-01 PROCEDURE — 82948 REAGENT STRIP/BLOOD GLUCOSE: CPT

## 2019-07-01 DEVICE — ICAST COVERED STENT, 6MMX16MMX120CM
Type: IMPLANTABLE DEVICE | Site: KIDNEY | Status: FUNCTIONAL
Brand: ICAST

## 2019-07-01 DEVICE — IMPLANTABLE DEVICE: Type: IMPLANTABLE DEVICE | Site: AORTA | Status: FUNCTIONAL

## 2019-07-01 DEVICE — PERCLOSE PROGLIDE™ SUTURE-MEDIATED CLOSURE SYSTEM
Type: IMPLANTABLE DEVICE | Site: ARTERIAL | Status: FUNCTIONAL
Brand: PERCLOSE PROGLIDE™

## 2019-07-01 DEVICE — STARCLOSE SE VASCULAR CLOSURE SYSTEM
Type: IMPLANTABLE DEVICE | Site: KIDNEY | Status: FUNCTIONAL
Brand: STARCLOSE SE

## 2019-07-01 RX ORDER — DEXAMETHASONE SODIUM PHOSPHATE 10 MG/ML
INJECTION, SOLUTION INTRAMUSCULAR; INTRAVENOUS AS NEEDED
Status: DISCONTINUED | OUTPATIENT
Start: 2019-07-01 | End: 2019-07-01 | Stop reason: SURG

## 2019-07-01 RX ORDER — ONDANSETRON 2 MG/ML
INJECTION INTRAMUSCULAR; INTRAVENOUS AS NEEDED
Status: DISCONTINUED | OUTPATIENT
Start: 2019-07-01 | End: 2019-07-01 | Stop reason: SURG

## 2019-07-01 RX ORDER — LIDOCAINE HYDROCHLORIDE 10 MG/ML
INJECTION, SOLUTION INFILTRATION; PERINEURAL AS NEEDED
Status: DISCONTINUED | OUTPATIENT
Start: 2019-07-01 | End: 2019-07-01 | Stop reason: SURG

## 2019-07-01 RX ORDER — HYDROCODONE BITARTRATE AND ACETAMINOPHEN 5; 325 MG/1; MG/1
1 TABLET ORAL EVERY 4 HOURS PRN
Status: DISCONTINUED | OUTPATIENT
Start: 2019-07-01 | End: 2019-07-02 | Stop reason: HOSPADM

## 2019-07-01 RX ORDER — SODIUM CHLORIDE 9 MG/ML
INJECTION, SOLUTION INTRAVENOUS CONTINUOUS PRN
Status: DISCONTINUED | OUTPATIENT
Start: 2019-07-01 | End: 2019-07-01 | Stop reason: SURG

## 2019-07-01 RX ORDER — SODIUM CHLORIDE, SODIUM LACTATE, POTASSIUM CHLORIDE, CALCIUM CHLORIDE 600; 310; 30; 20 MG/100ML; MG/100ML; MG/100ML; MG/100ML
INJECTION, SOLUTION INTRAVENOUS CONTINUOUS PRN
Status: DISCONTINUED | OUTPATIENT
Start: 2019-07-01 | End: 2019-07-01 | Stop reason: SURG

## 2019-07-01 RX ORDER — HEPARIN SODIUM 1000 [USP'U]/ML
INJECTION, SOLUTION INTRAVENOUS; SUBCUTANEOUS AS NEEDED
Status: DISCONTINUED | OUTPATIENT
Start: 2019-07-01 | End: 2019-07-01 | Stop reason: SURG

## 2019-07-01 RX ORDER — GLYCOPYRROLATE 0.2 MG/ML
INJECTION INTRAMUSCULAR; INTRAVENOUS AS NEEDED
Status: DISCONTINUED | OUTPATIENT
Start: 2019-07-01 | End: 2019-07-01 | Stop reason: SURG

## 2019-07-01 RX ORDER — AMLODIPINE BESYLATE 2.5 MG/1
5 TABLET ORAL DAILY
COMMUNITY
End: 2019-07-08 | Stop reason: SDUPTHER

## 2019-07-01 RX ORDER — NEOSTIGMINE METHYLSULFATE 1 MG/ML
INJECTION INTRAVENOUS AS NEEDED
Status: DISCONTINUED | OUTPATIENT
Start: 2019-07-01 | End: 2019-07-01 | Stop reason: SURG

## 2019-07-01 RX ORDER — ATORVASTATIN CALCIUM 40 MG/1
40 TABLET, FILM COATED ORAL DAILY
Status: DISCONTINUED | OUTPATIENT
Start: 2019-07-02 | End: 2019-07-02 | Stop reason: HOSPADM

## 2019-07-01 RX ORDER — PROPOFOL 10 MG/ML
INJECTION, EMULSION INTRAVENOUS CONTINUOUS PRN
Status: DISCONTINUED | OUTPATIENT
Start: 2019-07-01 | End: 2019-07-01 | Stop reason: SURG

## 2019-07-01 RX ORDER — SODIUM CHLORIDE 9 MG/ML
75 INJECTION, SOLUTION INTRAVENOUS CONTINUOUS
Status: DISCONTINUED | OUTPATIENT
Start: 2019-07-01 | End: 2019-07-02

## 2019-07-01 RX ORDER — PANTOPRAZOLE SODIUM 40 MG/1
40 TABLET, DELAYED RELEASE ORAL
Status: DISCONTINUED | OUTPATIENT
Start: 2019-07-02 | End: 2019-07-02 | Stop reason: HOSPADM

## 2019-07-01 RX ORDER — HEPARIN SODIUM 200 [USP'U]/100ML
INJECTION, SOLUTION INTRAVENOUS
Status: COMPLETED | OUTPATIENT
Start: 2019-07-01 | End: 2019-07-01

## 2019-07-01 RX ORDER — CEFAZOLIN SODIUM 2 G/50ML
2000 SOLUTION INTRAVENOUS ONCE
Status: COMPLETED | OUTPATIENT
Start: 2019-07-01 | End: 2019-07-01

## 2019-07-01 RX ORDER — EPHEDRINE SULFATE 50 MG/ML
INJECTION INTRAVENOUS AS NEEDED
Status: DISCONTINUED | OUTPATIENT
Start: 2019-07-01 | End: 2019-07-01 | Stop reason: SURG

## 2019-07-01 RX ORDER — CHLORHEXIDINE GLUCONATE 0.12 MG/ML
15 RINSE ORAL ONCE
Status: COMPLETED | OUTPATIENT
Start: 2019-07-01 | End: 2019-07-01

## 2019-07-01 RX ORDER — BISOPROLOL FUMARATE 5 MG/1
5 TABLET ORAL DAILY
Status: DISCONTINUED | OUTPATIENT
Start: 2019-07-02 | End: 2019-07-02 | Stop reason: HOSPADM

## 2019-07-01 RX ORDER — ROCURONIUM BROMIDE 10 MG/ML
INJECTION, SOLUTION INTRAVENOUS AS NEEDED
Status: DISCONTINUED | OUTPATIENT
Start: 2019-07-01 | End: 2019-07-01 | Stop reason: SURG

## 2019-07-01 RX ORDER — FENTANYL CITRATE 50 UG/ML
INJECTION, SOLUTION INTRAMUSCULAR; INTRAVENOUS AS NEEDED
Status: DISCONTINUED | OUTPATIENT
Start: 2019-07-01 | End: 2019-07-01 | Stop reason: SURG

## 2019-07-01 RX ORDER — PROPOFOL 10 MG/ML
INJECTION, EMULSION INTRAVENOUS AS NEEDED
Status: DISCONTINUED | OUTPATIENT
Start: 2019-07-01 | End: 2019-07-01 | Stop reason: SURG

## 2019-07-01 RX ORDER — ASPIRIN 81 MG/1
81 TABLET, CHEWABLE ORAL DAILY
Status: DISCONTINUED | OUTPATIENT
Start: 2019-07-02 | End: 2019-07-02 | Stop reason: HOSPADM

## 2019-07-01 RX ORDER — METOPROLOL TARTRATE 5 MG/5ML
INJECTION INTRAVENOUS AS NEEDED
Status: DISCONTINUED | OUTPATIENT
Start: 2019-07-01 | End: 2019-07-01 | Stop reason: SURG

## 2019-07-01 RX ORDER — FENTANYL CITRATE/PF 50 MCG/ML
25 SYRINGE (ML) INJECTION
Status: DISCONTINUED | OUTPATIENT
Start: 2019-07-01 | End: 2019-07-01 | Stop reason: HOSPADM

## 2019-07-01 RX ORDER — HYDROMORPHONE HCL/PF 1 MG/ML
0.2 SYRINGE (ML) INJECTION
Status: DISCONTINUED | OUTPATIENT
Start: 2019-07-01 | End: 2019-07-01 | Stop reason: HOSPADM

## 2019-07-01 RX ORDER — AMLODIPINE BESYLATE 5 MG/1
5 TABLET ORAL DAILY
Status: DISCONTINUED | OUTPATIENT
Start: 2019-07-02 | End: 2019-07-02 | Stop reason: HOSPADM

## 2019-07-01 RX ORDER — ONDANSETRON 2 MG/ML
4 INJECTION INTRAMUSCULAR; INTRAVENOUS ONCE AS NEEDED
Status: DISCONTINUED | OUTPATIENT
Start: 2019-07-01 | End: 2019-07-01 | Stop reason: HOSPADM

## 2019-07-01 RX ORDER — TAMSULOSIN HYDROCHLORIDE 0.4 MG/1
0.4 CAPSULE ORAL DAILY
Status: DISCONTINUED | OUTPATIENT
Start: 2019-07-02 | End: 2019-07-02 | Stop reason: HOSPADM

## 2019-07-01 RX ADMIN — EPHEDRINE SULFATE 5 MG: 50 INJECTION, SOLUTION INTRAVENOUS at 08:47

## 2019-07-01 RX ADMIN — FENTANYL CITRATE 50 MCG: 50 INJECTION, SOLUTION INTRAMUSCULAR; INTRAVENOUS at 08:20

## 2019-07-01 RX ADMIN — METOPROLOL TARTRATE 2.5 MG: 1 INJECTION, SOLUTION INTRAVENOUS at 08:14

## 2019-07-01 RX ADMIN — CHLORHEXIDINE GLUCONATE 15 ML: 1.2 RINSE ORAL at 06:51

## 2019-07-01 RX ADMIN — HEPARIN SODIUM 2000 UNITS: 1000 INJECTION INTRAVENOUS; SUBCUTANEOUS at 09:31

## 2019-07-01 RX ADMIN — HEPARIN SODIUM 6000 UNITS: 1000 INJECTION INTRAVENOUS; SUBCUTANEOUS at 08:31

## 2019-07-01 RX ADMIN — PROPOFOL 150 MG: 10 INJECTION, EMULSION INTRAVENOUS at 07:54

## 2019-07-01 RX ADMIN — PROPOFOL 100 MCG/KG/MIN: 10 INJECTION, EMULSION INTRAVENOUS at 07:59

## 2019-07-01 RX ADMIN — CEFAZOLIN SODIUM 2000 MG: 2 SOLUTION INTRAVENOUS at 08:02

## 2019-07-01 RX ADMIN — SODIUM CHLORIDE: 0.9 INJECTION, SOLUTION INTRAVENOUS at 08:00

## 2019-07-01 RX ADMIN — PROPOFOL 40 MG: 10 INJECTION, EMULSION INTRAVENOUS at 09:52

## 2019-07-01 RX ADMIN — DEXAMETHASONE SODIUM PHOSPHATE 5 MG: 10 INJECTION, SOLUTION INTRAMUSCULAR; INTRAVENOUS at 08:04

## 2019-07-01 RX ADMIN — HEPARIN SODIUM 2000 UNITS: 1000 INJECTION INTRAVENOUS; SUBCUTANEOUS at 09:13

## 2019-07-01 RX ADMIN — ONDANSETRON 4 MG: 2 INJECTION INTRAMUSCULAR; INTRAVENOUS at 08:04

## 2019-07-01 RX ADMIN — HEPARIN SODIUM 2000 UNITS: 1000 INJECTION INTRAVENOUS; SUBCUTANEOUS at 08:43

## 2019-07-01 RX ADMIN — HEPARIN SODIUM 1000 UNITS: 1000 INJECTION INTRAVENOUS; SUBCUTANEOUS at 08:58

## 2019-07-01 RX ADMIN — LIDOCAINE HYDROCHLORIDE 50 MG: 10 INJECTION, SOLUTION INFILTRATION; PERINEURAL at 07:54

## 2019-07-01 RX ADMIN — PHENYLEPHRINE HYDROCHLORIDE 50 MCG: 10 INJECTION INTRAVENOUS at 07:54

## 2019-07-01 RX ADMIN — PHENYLEPHRINE HYDROCHLORIDE 50 MCG/MIN: 10 INJECTION INTRAVENOUS at 08:37

## 2019-07-01 RX ADMIN — PHENYLEPHRINE HYDROCHLORIDE 100 MCG: 10 INJECTION INTRAVENOUS at 08:49

## 2019-07-01 RX ADMIN — SODIUM CHLORIDE, SODIUM LACTATE, POTASSIUM CHLORIDE, AND CALCIUM CHLORIDE: .6; .31; .03; .02 INJECTION, SOLUTION INTRAVENOUS at 07:20

## 2019-07-01 RX ADMIN — PHENYLEPHRINE HYDROCHLORIDE 100 MCG: 10 INJECTION INTRAVENOUS at 08:42

## 2019-07-01 RX ADMIN — ROCURONIUM BROMIDE 15 MG: 10 INJECTION INTRAVENOUS at 08:19

## 2019-07-01 RX ADMIN — SODIUM CHLORIDE 75 ML/HR: 0.9 INJECTION, SOLUTION INTRAVENOUS at 13:06

## 2019-07-01 RX ADMIN — GLYCOPYRROLATE 0.4 MG: 0.2 INJECTION, SOLUTION INTRAMUSCULAR; INTRAVENOUS at 09:56

## 2019-07-01 RX ADMIN — FENTANYL CITRATE 50 MCG: 50 INJECTION, SOLUTION INTRAMUSCULAR; INTRAVENOUS at 07:54

## 2019-07-01 RX ADMIN — NEOSTIGMINE METHYLSULFATE 3 MG: 1 INJECTION, SOLUTION INTRAVENOUS at 09:56

## 2019-07-01 RX ADMIN — ROCURONIUM BROMIDE 40 MG: 10 INJECTION INTRAVENOUS at 07:55

## 2019-07-01 NOTE — CONSULTS
Consultation - Nephrology   Constantin Prado 81 y o  male MRN: [id number]  Unit/Bed#: [facility] 502-01 Encounter: [id number]    ASSESSMENT and PLAN:  1  Chronic kidney disease stage IIIA with baseline creatinine around 1 1-1 2 back since 2013  Most recent creatinine 1 16 on 06/25/2019  Avoid hypotension, avoid nephrotoxic, continue with gentle intravenously fluids, follow serial labs  Hold ramipril for now    2  AAA without rupture status post revision EVAR  Postsurgical management per vascular surgery  3  Hypertension, blood pressure well controlled  As above hold ramipril for now  4  History of coronary artery disease, inferior MI 30 years ago  5  [address] of [address]:  Continue with intravenously fluids  Avoid hypotension  Hold ramipril for now  Follow serial labs  Avoid nephrotoxic  HISTORY OF PRESENT ILLNESS:  Requesting Physician: Alvino Amaya MD  Reason for Consult:  CKD, status post EVAR    Constantin Prado is a 81 y o  male who was admitted to [facility] for an elective abdominal aortic aneurysm status post endovascular repair with revision aortic stent graft  A renal consultation is requested today for assistance in the management of CKD  Patient with known history of chronic kidney disease stage IIIA previous creatinine 1 1-1 2, hypertension, history of AAA status post repair in 2005 who was admitted for an elective revision EVAR  Nephrology was consulted for comanagement given underlying CKD  During my evaluation patient lying in bed post procedure, awake, denies any complaints, patient wife at bedside  Patient denies any chest pain or shortness of breath, no abdominal pain, no urinary problems (had a Velasco catheter in place), denies any nausea, no vomiting, no diarrhea constipation  Denies NSAID use        PAST MEDICAL HISTORY:  Past Medical History:   Diagnosis Date    Allergic rhinitis resolved 12/15/2017    Anemia     resolved 12/15/2017    Arthritis     Cataract     resolved 12/15/2017    Coronary artery disease     Hearing problem     Heart attack ([facility] [address] )     Hiatal hernia     Hyperlipidemia     Hypertension     Impaired fasting glucose     resolved 12/15/2017    Numbness of leg     resolved 12/15/2017    Pneumonia 03/2019    Polyarthritis     resolved 12/15/2017    PONV (postoperative nausea and vomiting)     PVD (peripheral vascular disease) ([facility] [address] )        PAST SURGICAL HISTORY:  Past Surgical History:   Procedure Laterality Date    CARDIAC SURGERY      CATARACT EXTRACTION Bilateral     CORONARY ANGIOPLASTY WITH STENT PLACEMENT      IR EVAR      OTHER SURGICAL HISTORY      detatched bicep tendon       SOCIAL HISTORY:  Social History     Substance and Sexual Activity   Alcohol Use Yes    Frequency: 4 or more times a week    Drinks per session: 1 or 2     Social History     Substance and Sexual Activity   Drug Use Never     Social History     Tobacco Use   Smoking Status Former Smoker   Smokeless Tobacco Never Used   Tobacco Comment    last assessed 12/06/2017       FAMILY HISTORY:  Family History   Problem Relation Age of Onset    Anemia Mother     No Known Problems Father        ALLERGIES:  Allergies   Allergen Reactions    Sulfamethoxazole-Trimethoprim Nausea Only       MEDICATIONS:    Current Facility-Administered Medications:     [START ON 7/2/2019] enoxaparin (LOVENOX) subcutaneous injection 40 mg, 40 mg, Subcutaneous, Daily, Tali Dougherty MD    HYDROcodone-acetaminophen (NORCO) 5-325 mg per tablet 1 tablet, 1 tablet, Oral, Q4H PRN, Tali Dougherty MD    lactated ringers bolus 500 mL, 500 mL, Intravenous, Once PRN **AND** lactated ringers bolus 500 mL, 500 mL, Intravenous, Once PRN, Tali Dougherty MD    sodium chloride 0 9 % bolus 500 mL, 500 mL, Intravenous, Once PRN **AND** sodium chloride 0 9 % bolus 500 mL, 500 mL, Intravenous, Once PRN, Tali Dougherty MD  [facility] sodium chloride 0 9 % infusion, 75 mL/hr, Intravenous, Continuous, Terrell Richter MD, Last Rate: 75 mL/hr at 07/01/19 1306, 75 mL/hr at 07/01/19 1306    REVIEW OF SYSTEMS:  All the systems were reviewed and were negative except as documented on the HPI  PHYSICAL EXAM:  Current Weight: Weight - Scale: 87 5 kg (193 lb)  First Weight: Weight - Scale: 87 5 kg (193 lb)  Vitals:    07/01/19 1230 07/01/19 1245 07/01/19 1300 07/01/19 1330   BP: 125/58 128/62 131/60 136/58   Pulse: 58 58 62 68   Resp: 16 15 19    Temp:   99 8 °F (37 7 °C)    TempSrc:       SpO2: 93% 93% 93% 95%   Weight:       Height:           Intake/Output Summary (Last 24 hours) at 7/1/2019 1501  Last data filed at 7/1/2019 1330  Gross per 24 hour   Intake 2030 ml   Output 950 ml   Net 1080 ml     General: conscious, cooperative, in not acute distress  Eyes: conjunctivae pink, anicteric sclerae  ENT: lips and mucous membranes moist  Neck: supple, no JVD  Chest: clear breath sounds bilateral, no crackles, ronchus or wheezings  CVS: distinct S1 & S2, normal rate, regular rhythm  Abdomen: soft, non-tender, non-distended, normoactive bowel sounds  Extremities: no significant edema of both legs  Skin: no rash  Neuro: awake, alert, oriented        Invasive Devices:   Urethral Catheter Latex 16 Fr  (Active)   Site Assessment Clean;Skin intact 7/1/2019  1:30 PM   Collection Container Standard drainage bag 7/1/2019  1:30 PM   Securement Method Securing device (Describe) 7/1/2019  1:30 PM   Output (mL) 600 mL 7/1/2019  1:00 PM       Lab Results:   Results from last 7 days   Lab Units 06/25/19  1355   WBC Thousand/uL 5 69   HEMOGLOBIN g/dL 11 3*   HEMATOCRIT % 34 6*   PLATELETS Thousands/uL 190   SODIUM mmol/L 139   POTASSIUM mmol/L 4 4   CHLORIDE mmol/L 106   CO2 mmol/L 27   BUN mg/dL 24   CREATININE mg/dL 1 16   CALCIUM mg/dL 9 1           Portions of the record may have been created with voice recognition software   Occasional wrong word or "sound a like" substitutions may have occurred due to the inherent limitations of voice recognition software  Read the chart carefully and recognize, using context, where substitutions have occurred  If you have any questions, please contact the dictating provider

## 2019-07-01 NOTE — ANESTHESIA POSTPROCEDURE EVALUATION
Post-Op Assessment Note    CV Status:  Stable    Pain management: adequate     Mental Status:  Alert and awake   Hydration Status:  Euvolemic   PONV Controlled:  Controlled   Airway Patency:  Patent   Post Op Vitals Reviewed: Yes      Staff: CRNA           /51 (07/01/19 1014)    Temp 98 7 °F (37 1 °C) (07/01/19 1014)    Pulse 87 (07/01/19 1014)   Resp 20 (07/01/19 1014)    SpO2 93 % (07/01/19 1014)

## 2019-07-01 NOTE — OP NOTE
OPERATIVE REPORT  PATIENT NAME: Constantin Prado    :  1935  MRN: [id number]  Pt Location: [facility] HYBRID OR ROOM 02    SURGERY DATE: 2019    Surgeon(s) and Role:     * Alvino Amaya MD - Primary     * Elizabeth Peña MD - Co-surgeon    Preop Diagnosis:  AAA (abdominal aortic aneurysm) without rupture ([address] ) [I71 4]  Endoleak post (EVAR) endovascular aneurysm repair, initial encounter ([facility] ) Lilly Valencia    Post-Op Diagnosis Codes:     * AAA (abdominal aortic aneurysm) without rupture ([facility] [address] ) [I71 4]     * Endoleak post (EVAR) endovascular aneurysm repair, initial encounter ([place] [address] ) [T82 330A]    Procedure(s) (LRB):  REVISION EVAR WITH AORTIC EXTENSION CUFF X3 WITH 10H03tp GORE, RIGHT RENAL STENTING WITH 6X16mm iCASt (N/A)   PRE CLOSURE OF THE RIGHT COMMON FEMORAL ARTERY PUNCTURE SITE WITH PROGLIDE CLOSURE SYSTEM  Specimen(s):  * No specimens in log *    Estimated Blood Loss:   Minimal    Drains:  Urethral Catheter Latex 16 Fr  (Active)   Site Assessment Clean;Skin intact 2019  8:04 AM   Collection Container Standard drainage bag 2019  8:04 AM   Securement Method Securing device (Describe) 2019  8:04 AM   Number of days: 0       Anesthesia Type:   General    Operative Indications:  AAA (abdominal aortic aneurysm) without rupture ([facility] [address] ) [I71 4]  Endoleak post (EVAR) endovascular aneurysm repair, initial encounter ([facility] ) [Q55 970C]  63-MIRD-CAU with remote history of aneurysm repair using AneuRx stent graft  On follow-up he had migration of the proximal graft more distally with enlargement of aortic sac consistent with a large type 1 endoleak  Operative Findings:  Significant migration of stent graft with approximately 6 cm of distance between the lowest right renal artery and the bifurcation of the stent graft  There is also severe angulation of the graft in the AP direction    Due bridge this segment 2 separate [place] aortic extension cuffs were placed, the 1st abutting the lowest right renal artery and the 2nd placed as distally as possible just above the bifurcation of the old stent graft  There was only a minimal overlap between these 2 cuffs thus a 3rd bridging stent graft was placed  All 3 aortic extension grafts were 36 by 45 mm  At the conclusion of the procedure there was no residual endoleak but was unclear whether the right renal artery was partially covered by the proximal graft thus a 6 mm x 16 mm I cast stent was placed in the right renal artery  Upon placement there was some displacement of the top of the aortic cuff inferiorly consistent with partial covering of the right renal artery  At the conclusion of the procedure the patient had palpable pedal pulses  Complications:   None    Procedure and Technique: Both a vascular surgeon and interventional radiologist were present through the entire procedure to take advantage of their specific skill sets and treatment of this complex stent graft revision  General anesthesia was administered  A radial A-line was placed  Bilateral groins as well as the abdomen were prepped and draped in normal sterile fashion with chlorhexidine prep  An [place] drape was placed  Bilateral common femoral arteries were punctured with micropuncture kit  The right femoral artery was dilated with a 6 [place] sheath and pre closure was performed with 2 ProGlide closure systems  And 18 [place] sheath was then passed into the aortic stent graft body  On the left a 5 [place] sheath was placed and a pigtail catheter was positioned just above the old aortic stent graft  IV heparin was administered  ACT levels were obtained  Further heparin was administered to maintain a therapeutic level  Appropriate oblique images were then obtained  This required a steep cranial caudal and Hebrew position to best visualize the lowest right renal artery    Once this artery was adequately visualized it was noted the renal artery was approximately 6 cm from the flow divider of the aortic graft  It was felt a strategy of placement of the 1st aortic cuff abutting the right renal artery followed by the 2nd cuff abutting the flow divider and then addition of a bridging cuff if necessary  With an appropriate obliquity the right renal artery was marked  A 36 x 45 mm [place] aortic extension graft was then position  Following repositioning a final image was obtained  The graft was then deployed  The delivery system was removed  The 2nd 36 x 45 mm [place] aortic extension graft was then position at the approximate aortic bifurcation  Appropriate obliquities were then obtained to better identify the bifurcation of the old aortic stent graft  Once the graft was positioned appropriately it was deployed  At this point was noted that there was minimal overlap between these 2 cuffs thus a 3rd 36 x 45 mm [place] extension graft was positioned across these 2 cuffs and deployed  After the delivery system was removed a compliant aortic balloon was positioned and angioplasty was performed of all 3 of these cuffs from the level of the lowest right renal artery to the aortic bifurcation  A completion image was then obtained which showed rapid flow through the stent graft and both iliac limbs with no evidence of endoleak  The right renal artery was visualized but was unclear whether the top of the cuff impinged upon the ostia  Due to the severe cranial caudal angulation require to appropriately image the top of the graft it was felt cannulation of the right renal artery with placement of a right renal artery stent would be necessary  A VS2 catheter and angled Glidewire was then used to access the right renal artery  Accessing the artery was somewhat challenging also consistent with partial coverage of the right renal artery  This was subsequently exchanged for an appropriate exchange wire and a 6 Citizen of [place] sheath was passed into the main renal artery    A 6 x 16 mm I cast stent was then positioned in the proximal renal artery with approximately 3 mm of stent within the aorta  This was then deployed  There was some deviation of the top of the graft inferiorly consistent with partial coverage of the right renal artery  A completion image showed wide patency the right main renal artery without evidence of stenosis  At this point all guidewires and catheters were removed  A Star closure device was used to close the left femoral puncture site  The pre closure system was then used to close the right femoral puncture site  Manual compression was held for 2-3 minutes  No bleeding was encountered  The patient had palpable pedal pulses  The patient was awoken from anesthesia and transferred to recovery room         I was present for the entire procedure    Patient Disposition:  PACU      Vascular Quality Initiative - EVAR Proximal Aortic Extensions    First Proximal Aortic Extension    Device : Graft details GORE   Device Diameter: 36mm  Device Length:  45mm  Was this device planned for preoperatively?: Yes, revision procedure    Second Proximal Aortic Extension    Device : Graft details GORE   Device Diameter: 36mm  Device Length:  45mm  Was this device planned for preoperatively?: Yes    Third Proximal Aortic Extension    Device : Graft details GORE   Device Diameter: 36mm  Device Length:  45mm  Was this device planned for preoperatively?: Yes        SIGNATURE: Charly Guerrero MD  DATE: July 1, 2019  TIME: 10:05 AM

## 2019-07-01 NOTE — INTERVAL H&P NOTE
H&P reviewed  After examining the patient I find no changes in the patients condition since the H&P had been written  There were no vitals taken for this visit

## 2019-07-01 NOTE — ANESTHESIA PROCEDURE NOTES
Arterial Line Insertion  Performed by: Sheila Wright CRNA  Authorized by: Kashmir Swift MD   Consent: Verbal consent obtained  Written consent obtained  Risks and benefits: risks, benefits and alternatives were discussed  Consent given by: patient and spouse  Patient understanding: patient states understanding of the procedure being performed  Patient consent: the patient's understanding of the procedure matches consent given  Procedure consent: procedure consent matches procedure scheduled  Relevant documents: relevant documents present and verified  Test results: test results available and properly labeled  Site marked: the operative site was marked  Radiology Images: Radiology Images displayed and confirmed  If images not available, report reviewed  Required items: required blood products, implants, devices, and special equipment available  Patient identity confirmed: verbally with patient, arm band, provided demographic data and hospital-assigned identification number  Time out: Immediately prior to procedure a "time out" was called to verify the correct patient, procedure, equipment, support staff and site/side marked as required  Preparation: Patient was prepped and draped in the usual sterile fashion    Indications: hemodynamic monitoring  Orientation:  Right  Location: radial artery  Sedation:  Patient sedated: GA     Procedure Details:  Gus's test normal: yes  Needle gauge: 20  Seldinger technique: Seldinger technique used  Number of attempts: 1    Post-procedure:  Post-procedure: dressing applied  Waveform: good waveform  Patient tolerance: Patient tolerated the procedure well with no immediate complications  Comments: Performed by Aminata Meadows CRNA negative...

## 2019-07-02 VITALS
HEIGHT: 66 IN | TEMPERATURE: 97.7 F | RESPIRATION RATE: 24 BRPM | OXYGEN SATURATION: 99 % | WEIGHT: 193 LBS | BODY MASS INDEX: 31.02 KG/M2 | SYSTOLIC BLOOD PRESSURE: 164 MMHG | DIASTOLIC BLOOD PRESSURE: 67 MMHG | HEART RATE: 57 BPM

## 2019-07-02 LAB
ANION GAP SERPL CALCULATED.3IONS-SCNC: 7 MMOL/L (ref 4–13)
BUN SERPL-MCNC: 20 MG/DL (ref 5–25)
CALCIUM SERPL-MCNC: 8.1 MG/DL (ref 8.3–10.1)
CHLORIDE SERPL-SCNC: 109 MMOL/L (ref 100–108)
CO2 SERPL-SCNC: 27 MMOL/L (ref 21–32)
CREAT SERPL-MCNC: 1.11 MG/DL (ref 0.6–1.3)
ERYTHROCYTE [DISTWIDTH] IN BLOOD BY AUTOMATED COUNT: 14.8 % (ref 11.6–15.1)
GFR SERPL CREATININE-BSD FRML MDRD: 61 ML/MIN/1.73SQ M
GLUCOSE SERPL-MCNC: 143 MG/DL (ref 65–140)
HCT VFR BLD AUTO: 28.7 % (ref 36.5–49.3)
HGB BLD-MCNC: 9.2 G/DL (ref 12–17)
MCH RBC QN AUTO: 29.6 PG (ref 26.8–34.3)
MCHC RBC AUTO-ENTMCNC: 32.1 G/DL (ref 31.4–37.4)
MCV RBC AUTO: 92 FL (ref 82–98)
PLATELET # BLD AUTO: 158 THOUSANDS/UL (ref 149–390)
PMV BLD AUTO: 11.3 FL (ref 8.9–12.7)
POTASSIUM SERPL-SCNC: 3.6 MMOL/L (ref 3.5–5.3)
RBC # BLD AUTO: 3.11 MILLION/UL (ref 3.88–5.62)
SODIUM SERPL-SCNC: 143 MMOL/L (ref 136–145)
WBC # BLD AUTO: 8.11 THOUSAND/UL (ref 4.31–10.16)

## 2019-07-02 PROCEDURE — 85027 COMPLETE CBC AUTOMATED: CPT | Performed by: SURGERY

## 2019-07-02 PROCEDURE — 80048 BASIC METABOLIC PNL TOTAL CA: CPT | Performed by: SURGERY

## 2019-07-02 PROCEDURE — 99024 POSTOP FOLLOW-UP VISIT: CPT | Performed by: PHYSICIAN ASSISTANT

## 2019-07-02 PROCEDURE — 99024 POSTOP FOLLOW-UP VISIT: CPT | Performed by: SURGERY

## 2019-07-02 PROCEDURE — 99232 SBSQ HOSP IP/OBS MODERATE 35: CPT | Performed by: INTERNAL MEDICINE

## 2019-07-02 RX ORDER — INDAPAMIDE 2.5 MG/1
2.5 TABLET, FILM COATED ORAL DAILY
Status: SHIPPED | OUTPATIENT
Start: 2019-07-03 | End: 2019-07-02 | Stop reason: SDUPTHER

## 2019-07-02 RX ORDER — RAMIPRIL 10 MG/1
10 CAPSULE ORAL DAILY
Status: SHIPPED | OUTPATIENT
Start: 2019-07-03 | End: 2019-07-02 | Stop reason: SDUPTHER

## 2019-07-02 RX ORDER — ACETAMINOPHEN 325 MG/1
650 TABLET ORAL EVERY 6 HOURS PRN
Status: SHIPPED | OUTPATIENT
Start: 2019-07-02

## 2019-07-02 RX ORDER — ACETAMINOPHEN 325 MG/1
650 TABLET ORAL EVERY 6 HOURS PRN
Status: SHIPPED | OUTPATIENT
Start: 2019-07-02 | End: 2019-07-02 | Stop reason: SDUPTHER

## 2019-07-02 RX ORDER — RAMIPRIL 10 MG/1
10 CAPSULE ORAL DAILY
Refills: 0 | Status: SHIPPED | OUTPATIENT
Start: 2019-07-03 | End: 2019-07-08 | Stop reason: SDUPTHER

## 2019-07-02 RX ORDER — INDAPAMIDE 2.5 MG/1
2.5 TABLET, FILM COATED ORAL DAILY
Refills: 0 | Status: SHIPPED | OUTPATIENT
Start: 2019-07-03 | End: 2019-08-05 | Stop reason: SDUPTHER

## 2019-07-02 RX ADMIN — ATORVASTATIN CALCIUM 40 MG: 40 TABLET, FILM COATED ORAL at 08:10

## 2019-07-02 RX ADMIN — PANTOPRAZOLE SODIUM 40 MG: 40 TABLET, DELAYED RELEASE ORAL at 05:08

## 2019-07-02 RX ADMIN — SODIUM CHLORIDE 75 ML/HR: 0.9 INJECTION, SOLUTION INTRAVENOUS at 02:53

## 2019-07-02 RX ADMIN — BISOPROLOL FUMARATE 5 MG: 5 TABLET ORAL at 08:10

## 2019-07-02 RX ADMIN — TAMSULOSIN HYDROCHLORIDE 0.4 MG: 0.4 CAPSULE ORAL at 08:07

## 2019-07-02 RX ADMIN — AMLODIPINE BESYLATE 5 MG: 5 TABLET ORAL at 08:07

## 2019-07-02 RX ADMIN — ASPIRIN 81 MG 81 MG: 81 TABLET ORAL at 08:10

## 2019-07-02 RX ADMIN — ENOXAPARIN SODIUM 40 MG: 40 INJECTION SUBCUTANEOUS at 08:10

## 2019-07-02 NOTE — PROGRESS NOTES
Vascular Nurse Navigator Education Rounds    Patient is appropriate and accepting to education  Nurse Navigator role reviewed  Patient was seen and education preoperatively  Patient was educated with Review of written materials provided, Teachback, Explanation, Demonstration and Question & Answer  Patient is a non-smoker  Education on infection prevention, activity limitations, when to call the office, importance of follow up, and incisional care complete  Patient had good recall of instructions and education and feels he was prepared well for the hospital experience  He has no complaints at this time and is pleasantly surprised at the lack of pain  Care package given and contents explained  Encouraged to call with any questions or concerns

## 2019-07-02 NOTE — DISCHARGE INSTRUCTIONS
DISCHARGE INSTRUCTIONS  ENDOVASCULAR ANEURYSM REPAIR    Following discharge from the hospital, you may have some questions about your operation, your activities or your general condition  These instructions may answer some of your questions and help you adjust during the first few weeks following your operation  ACTIVITY:  Limit your physical activity to slow walking  Avoid climbing or heavy lifting for the first two weeks after surgery  Walking up steps and normal activities may be resumed, as you feel ready  You should not drive a car for three to four days following discharge from the hospital   You may ride in a car upon discharge  DIET:  Resume your normal diet  Try to eat low fat and low cholesterol foods  INCISION:  You may include the operated area in a shower on the second day following surgery  Sitting in a tub is not recommended for the first week following surgery or if you have any open wounds  Your physician may have chosen to use a type of adhesive glue, to close your incision  There are stitches present under the skin which will absorb on their own  The glue is used to cover the incision, assist in closure, and prevent contamination  This adhesive will darken and peel away on its own within one to two weeks  If one is present, you may remove the dressing, band-aid or steri-strips over your wound after two days  Numbness in the region of the incision may occur following the surgery  This normally resolves in six to twelve months  It is normal to have some bruising, swelling or mild discoloration around the incision  If increasing redness or pain develops, call our office immediately  If you notice any active bleeding, apply pressure to the site and call 911 or go to the nearest Emergency Department  Appt severo/ Emerita Lockett, PAC: 7/17/19 at 8:30am, Andrea office  [address], [place]  [address]    [phone]  [phone] TOLL FREE [phone]  [address] [facility] , [address], [place], [facility]  [address], [address], [place], [address]  [address], [place], [address]  [address] [facility], [address] [facility], [facility], [address]  [address], [address], [address]  [address] [facility] [address]   [address] [facility], [address] [facility], [address] [facility]  [address] [facility], [address] [facility], [place], [address]    FOLLOW UP STUDIES:  Doppler ultrasound studies, CT scans and abdominal x-rays are very important to your post-operative care  Your surgeon will arrange for them at your first postoperative visit

## 2019-07-02 NOTE — PROGRESS NOTES
Progress Note - Vascular Surgery   Dat Jackson 81 y o  male MRN: [id number]  Unit/Bed#: [facility] 502-01 Encounter: [id number]    Assessment/Plan:  81 y o  male with endoleak of previous EVAR graft s/p EVAR revision     -d/c kim Arvizu  -continue to monitor  -PT/OT    Subjective/Objective     Subjective: Feels well  No acute events  No complaints      Objective:     Vitals: Temp:  [97 5 °F (36 4 °C)-99 8 °F (37 7 °C)] 97 7 °F (36 5 °C)  HR:  [52-87] 64  Resp:  [15-22] 20  BP: (101-169)/(41-73) 169/72  Arterial Line BP: (119-146)/(42-56) 134/56  Body mass index is 31 15 kg/m²  I/O       06/30 0701 - 07/01 0700 07/01 0701 - 07/02 0700    P  O   220    I V  (mL/kg)  3031 3 (34 6)    Total Intake(mL/kg)  3251 3 (37 2)    Urine (mL/kg/hr)  2425 (1 2)    Blood  50    Total Output  2475    Net  +776 3                Physical Exam:  GEN: NAD  HEENT: MMM  CV: RRR  Lung: Normal effort  Ab: Soft, NT/ND  Extrem: No CCE  B/l groin punctures without hematoma  R with ecchymosis   Neuro:  A+Ox3    Lab, Imaging and other studies:   CBC with diff:   Lab Results   Component Value Date    WBC 8 11 07/02/2019    HGB 9 2 (L) 07/02/2019    HCT 28 7 (L) 07/02/2019    MCV 92 07/02/2019     07/02/2019    MCH 29 6 07/02/2019    MCHC 32 1 07/02/2019    RDW 14 8 07/02/2019    MPV 11 3 07/02/2019   , BMP/CMP:   Lab Results   Component Value Date    SODIUM 143 07/02/2019    K 3 6 07/02/2019     (H) 07/02/2019    CO2 27 07/02/2019    BUN 20 07/02/2019    CREATININE 1 11 07/02/2019    CALCIUM 8 1 (L) 07/02/2019    EGFR 61 07/02/2019     VTE Pharmacologic Prophylaxis: Heparin  VTE Mechanical Prophylaxis: sequential compression device

## 2019-07-02 NOTE — PLAN OF CARE
Problem: Potential for Falls  Goal: Patient will remain free of falls  Description  INTERVENTIONS:  - Assess patient frequently for physical needs  -  Identify cognitive and physical deficits and behaviors that affect risk of falls    -  [place] fall precautions as indicated by assessment   - Educate patient/family on patient safety including physical limitations  - Instruct patient to call for assistance with activity based on assessment  - Modify environment to reduce risk of injury  - Consider OT/PT consult to assist with strengthening/mobility  Outcome: Progressing

## 2019-07-02 NOTE — DISCHARGE SUMMARY
Discharge Summary   Shalonda Tatum 81 y o  male MRN: [id number]  Unit/Bed#: [facility] 502-01 Encounter: [id number]    Admission Date: 7/1/2019     Discharge Date:07/02/19    Attending:Cody Hinton MD     Consultants:  nephrology    Admitting Diagnosis: AAA (abdominal aortic aneurysm) without rupture ([facility] 75 ) [I71 4]  Endoleak post (EVAR) endovascular aneurysm repair, initial encounter ([place] [address] ) [T82 330A]    Principle/ Secondary Diagnosis:  · Endoleak of AAA s/p EVAR (sac enlargement & graft migration)  · Postoperative acute blood loss anemia secondary to procedural and dilutional loss  · CKD 3a (Baseline Cr 1 1- 1  2)  · Hypertension    Past Medical History:   Diagnosis Date    Allergic rhinitis     resolved 12/15/2017    Anemia     resolved 12/15/2017    Arthritis     Cataract     resolved 12/15/2017    Coronary artery disease     Hearing problem     [facility]     Hiatal hernia     Hyperlipidemia     Hypertension     Impaired fasting glucose     resolved 12/15/2017    Numbness of leg     resolved 12/15/2017    Pneumonia 03/2019    Polyarthritis     resolved 12/15/2017    PONV (postoperative nausea and vomiting)     PVD (peripheral vascular disease) ([facility] )      Past Surgical History:   Procedure Laterality Date    CARDIAC SURGERY      CATARACT EXTRACTION Bilateral     CORONARY ANGIOPLASTY WITH STENT PLACEMENT      IR EVAR      OTHER SURGICAL HISTORY      detatched bicep tendon    [place] EVASC RPR DPLMNT AORTO-AORTIC NDGFT N/A 7/1/2019    Procedure: REVISION EVAR WITH AORTIC EXTENSION CUFF X3 WITH [id number] GORE, RIGHT RENAL STENTING WITH 6X16mm iCASt;  Surgeon: Mono Baldwin MD;  Location: [facility];  Service: Vascular       Procedures Performed:   · 07/01/2019 Revision EVAR with aortic extension cuff x3    Right renal artery stenting- [place]    Laboratory data at discharge:   Results from last 7 days   Lab Units 07/02/19  0508   WBC Thousand/uL 8 11   HEMOGLOBIN g/dL 9 2*   HEMATOCRIT % 28 7* PLATELETS Thousands/uL 158     Results from last 7 days   Lab Units 07/02/19  0508   POTASSIUM mmol/L 3 6   CHLORIDE mmol/L 109*   CO2 mmol/L 27   BUN mg/dL 20   CREATININE mg/dL 1 11   CALCIUM mg/dL 8 1*             Discharge instructions/Information to patient and family:   See after visit summary for information provided to patient and family  Discharge Medications:  See after visit summary for reconciled discharge medications provided to patient and family  · Viagra held postoperatively  Resumption can be addressed in the outpatient setting  · Continued antiplatelet-aspirin 81 mg daily  · Continued statin-Lipitor  · Hypertension regimen with Norvasc 5 mg daily and bisoprolol 5 mg daily  Lozol 2 5 mg daily and ramipril 10 mg daily to be resumed on 07/03/2019 (48h postop)    Hospital Course:   Wojciech Russell is an 82y/o male previously known to [facility], Dr Miguel Angel Vasquez, who treated his AAA with EVAR in 2005  He has since been referred to the vascular Center with findings of endoleak with sac enlargement and graft migration  He was recommended endovascular revision  Prior to proceeding, he was evaluated in the outpatient setting and cardiac cleared  On 07/01/2019, the patient was electively admitted to [facility] at which time he was taken to the operating room and underwent revision Lisa are with aortic extension cuff x3 and right renal artery stenting by Dr Nir Jackman  Postoperatively, he was maintained in the PACU then transferred to a medical surgical/telemetry/step-down floor where he continued to convalesce for the remainder of his hospitalization  By POD #1, he remained neurovascularly intact  His puncture sites were stable  He was tolerating a diet  He was ambulatory  He was able to void post Velasco catheter removal   His pain was controlled without use of oral narcotics    He was deemed appropriate and stable for discharge and was discharged in the care of his family on 07/02/2019  Of note, with a history of CKD stage IIIB with a baseline creatinine of 1 1-1 2 and procedure to include stenting of the right renal artery, a consultation was placed for Nephrology colleagues for overall assistance in renal management  Nephrotoxic agents were held  He received gentle IV fluid hydration  Renal function remained stable  In regards to hypertension, the patient was noted to have mild blood pressure elevation  He was continued on bisoprolol and Norvasc  Consideration may be giving for up titrating the Norvasc from 5-10 mg daily  Periprocedurally, as mentioned above, nephrotoxic agents/ ACE-I/ ARB were held  Plan is for resumption of Lozol and ramapril in 24 hr (48h postop)  Consideration may be given for up titration for manipulation of medications upon follow-up in the outpatient setting  Hospital course was complicated by the following:      Prior to discharge, the patient was given instructions for outpatient care and follow-up  The patient has been instructed to call w/ any questions, changes, or concerns for the medical condition  For further details of the hospitalization, please refer to the medical record  Condition at Discharge: stable     Provisions for Follow-Up Care:  See after visit summary for information related to follow-up care and any pertinent home health orders  Disposition: Home    Planned Readmission: No    Discharge Statement   I spent 30 minutes discharging the patient  This time was spent on the day of discharge  I had direct contact with the patient on the day of discharge  Additional documentation is required if more than 30 minutes were spent on discharge       Coral Caceres PA-C  7/2/2019

## 2019-07-02 NOTE — PROGRESS NOTES
NEPHROLOGY PROGRESS NOTE   Stephan Henry 81 y o  male MRN: [id number]  Unit/Bed#: [facility] 502-01 Encounter: [id number]      ASSESSMENT/PLAN:  1  CKD stage IIIA:  Baseline creatinine 1 1-1 2  Most recent creatinine was 1 16 on 06/25 and now is stable at 1 1 postoperatively  · Ramipril on hold postoperatively but would restart tomorrow if labs remain stable  · Okay to stop fluids from a nephrology standpoint  · Check a m  BMP  2  AAA without rupture status post revision EVAR  3  Hypertension:  Blood pressure well controlled  Continue amlodipine  Continue to hold ramipril  4  History of CAD with MI about 30 years ago  11  History of aortic aneurysm repair 2005      SUBJECTIVE:  Patient is feeling well  He has no abdominal pain, chest pain, shortness of breath  He is eating and drinking well  Good urine output      OBJECTIVE:  Current Weight: Weight - Scale: 87 5 kg (193 lb)  Vitals:    07/02/19 0748   BP: 148/67   Pulse: 61   Resp: 20   Temp: 98 3 °F (36 8 °C)   SpO2: 96%       Intake/Output Summary (Last 24 hours) at 7/2/2019 0944  Last data filed at 7/2/2019 0900  Gross per 24 hour   Intake 1751 25 ml   Output 3500 ml   Net -1748 75 ml     General:  No acute distress  Skin:  No rash  Eyes:  Sclerae anicteric  ENT:  Moist mucous membranes  Neck:  Supple, symmetric  Chest:  Clear to auscultation bilaterally   CVS:  Regular rate and rhythm  Abdomen:  Soft, nontender, nondistended  Extremities:  No edema  Neuro:  Awake and alert  Psych:  Appropriate affect     Medications:  Scheduled Meds:  Current Facility-Administered Medications:  amLODIPine 5 mg Oral Daily Tatiana Duncan PA-C    aspirin 81 mg Oral Daily Tatiana Duncan PA-C    atorvastatin 40 mg Oral Daily Tatiana Duncan PA-C    bisoprolol 5 mg Oral Daily Sunshine Owens PA-C    enoxaparin 40 mg Subcutaneous Daily Charly Guerrero MD    HYDROcodone-acetaminophen 1 tablet Oral Q4H PRN Charly Guerrero MD    lactated ringers 500 mL Intravenous Once PRN Nida Shultz MD    And        lactated ringers 500 mL Intravenous Once PRN Nida Shultz MD    pantoprazole 40 mg Oral Early Morning Jacqueline Yates PA-C    sodium chloride 500 mL Intravenous Once PRN Nida Shultz MD    And        sodium chloride 500 mL Intravenous Once PRN Nida Shultz MD    sodium chloride 75 mL/hr Intravenous Continuous Nida Shultz MD Last Rate: 75 mL/hr (07/02/19 0253)   tamsulosin 0 4 mg Oral Daily Marisela Owens PA-C        PRN Meds:  HYDROcodone-acetaminophen    lactated ringers **AND** lactated ringers    sodium chloride **AND** sodium chloride    Continuous Infusions:  sodium chloride 75 mL/hr Last Rate: 75 mL/hr (07/02/19 0253)       Laboratory Results:  Results from last 7 days   Lab Units 07/02/19  0508 06/25/19  1355   WBC Thousand/uL 8 11 5 69   HEMOGLOBIN g/dL 9 2* 11 3*   HEMATOCRIT % 28 7* 34 6*   PLATELETS Thousands/uL 158 190   SODIUM mmol/L 143 139   POTASSIUM mmol/L 3 6 4 4   CHLORIDE mmol/L 109* 106   CO2 mmol/L 27 27   BUN mg/dL 20 24   CREATININE mg/dL 1 11 1 16   CALCIUM mg/dL 8 1* 9 1

## 2019-07-02 NOTE — UTILIZATION REVIEW
Initial Clinical Review    Elective IP surgical procedure    Age/Sex: 81 y o  male     Surgery Date: 7/1/19    Procedure:   Preop Diagnosis:  AAA (abdominal aortic aneurysm) without rupture ([facility]) [I71 4]  Endoleak post (EVAR) endovascular aneurysm repair, initial encounter ([facility] ) [T82 330A]     Post-Op Diagnosis Codes:     * AAA [facility]abdominal aortic aneurysm) [facility] ([facility] ) [I71 4]     * Endoleak post (EVAR) endovascular aneurysm repair, initial encounter ([facility] ) Octaviano Jesus     Procedure(s) (LRB):  REVISION EVAR WITH AORTIC EXTENSION CUFF X3 WITH [id number] GORE, RIGHT RENAL STENTING WITH 6X16mm iCASt (N/A)   PRE CLOSURE OF THE RIGHT COMMON FEMORAL ARTERY PUNCTURE SITE WITH PROGLIDE CLOSURE SYSTEM  Anesthesia: GENERAL     Operative Findings: Significant migration of stent graft with approximately 6 cm of distance between the lowest right renal artery and the bifurcation of the stent graft  There is also severe angulation of the graft in the AP direction  Due bridge this segment 2 separate Vidor aortic extension cuffs were placed, the 1st abutting the lowest right renal artery and the 2nd placed as distally as possible just above the bifurcation of the old stent graft  There was only a minimal overlap between these 2 cuffs thus a 3rd bridging stent graft was placed  All 3 aortic extension grafts were 36 by 45 mm  At the conclusion of the procedure there was no residual endoleak but was unclear whether the right renal artery was partially covered by the proximal graft thus a 6 mm x 16 mm I cast stent was placed in the right renal artery  Upon placement there was some displacement of the top of the aortic cuff inferiorly consistent with partial covering of the right renal artery  At the conclusion of the procedure the patient had palpable pedal pulses        Admission Orders: Date/Time/Statement: 7/1/19 @ 0903     Orders Placed This Encounter   Procedures    Inpatient Admission     Standing Status:   Standing Number of Occurrences:   1     Order Specific Question:   Admitting Physician     Answer:   Manolo Mott     Order Specific Question:   Level of Care     Answer:   Level 1 Stepdown [13]     Order Specific Question:   Estimated length of stay     Answer:   Inpatient Only Surgery     Vital Signs: /67 (BP Location: Right arm)   Pulse 57   Temp 97 7 °F (36 5 °C) (Oral)   Resp (!) 24   Ht 5' 6" (1 676 m)   Wt 87 5 kg (193 lb)   SpO2 99%   BMI 31 15 kg/m²      Diet: REGULAR    Mobility: SCDs    DVT Prophylaxis: SCDs, ENOXAPARIN SQ DAILY    Pain Control:     PRN Meds:  HYDROcodone-acetaminophen    Network Utilization Review Department  Phone: [phone]; Fax [phone]  [email or web address]  ATTENTION: Please call with any questions or concerns to [phone]  and carefully listen to the prompts so that you are directed to the right person  Send all requests for admission clinical reviews, approved or denied determinations and any other requests to fax [phone]   All voicemails are confidential

## 2019-07-02 NOTE — SOCIAL WORK
CM met with Pt with an introduction and explanation of role  Pt reported residing with spouse in a 2 story home with no use of DME and 6 steps to enter the home  Pt reported being independent with ADLs with no hx of VNA, SNF, mental health or drug/alcohol placements  Pt reported having a living will, uses [employer] [facility] on [address] and has Elex Bamberger as a PCP  Pt's family to transport upon d/c     CM reviewed d/c planning process including the following: identifying help at home, patient preference for d/c planning needs, Discharge Lounge, [facility] Meds to Bed program, availability of treatment team to discuss questions or concerns patient and/or family may have regarding understanding medications and recognizing signs and symptoms once discharged  CM also encouraged patient to follow up with all recommended appointments after discharge  Patient advised of importance for patient and family to participate in managing patients medical well being

## 2019-07-03 ENCOUNTER — TRANSITIONAL CARE MANAGEMENT (OUTPATIENT)
Dept: INTERNAL MEDICINE CLINIC | Facility: CLINIC | Age: 84
End: 2019-07-03

## 2019-07-08 DIAGNOSIS — I10 ESSENTIAL HYPERTENSION: ICD-10-CM

## 2019-07-08 RX ORDER — AMLODIPINE BESYLATE 2.5 MG/1
5 TABLET ORAL DAILY
Qty: 90 TABLET | Refills: 1 | Status: SHIPPED | OUTPATIENT
Start: 2019-07-08 | End: 2019-12-02 | Stop reason: SDUPTHER

## 2019-07-08 RX ORDER — RAMIPRIL 10 MG/1
10 CAPSULE ORAL DAILY
Qty: 90 CAPSULE | Refills: 1 | Status: SHIPPED | OUTPATIENT
Start: 2019-07-08 | End: 2020-04-06

## 2019-07-10 ENCOUNTER — TELEPHONE (OUTPATIENT)
Dept: VASCULAR SURGERY | Facility: CLINIC | Age: 84
End: 2019-07-10

## 2019-07-10 NOTE — TELEPHONE ENCOUNTER
Nurse Navigator Post Op Phone Call    Post-Discharge phone call to assess patient recovery after vascular surgery  Will attempt contact at later date

## 2019-07-15 NOTE — PROGRESS NOTES
No problem-specific Assessment & Plan notes found for this encounter  Assessment/Plan   {Assess/PlanSmartLinks:52949}    No chief complaint on file  Subjective   Patient ID: Charles Vergara is a 81 y o  male  Pt is here for a follow up to his Revision EVAR w/Arotic Extension Cuff on 7/1/19 by Dr Pio Álvarez  Pt is currently taking  ASA and Lipitor      HPI    {History Review (Optional):47082}    Review of Systems    Patient Active Problem List   Diagnosis    Type 2 diabetes mellitus without complication ([facility] )    Peripheral vascular disease ([facility] )    AAA (abdominal aortic aneurysm) without rupture (HCC)    Essential hypertension    Pure hypercholesterolemia    Endoleak post (EVAR) [facility]ovascular [facility]    History of acute inferior wall MI    Preop cardiovascular exam       Past Surgical History:   Procedure Laterality Date    CARDIAC SURGERY      CATARACT EXTRACTION Bilateral     CORONARY ANGIOPLASTY WITH STENT PLACEMENT      IR EVAR      IR EVAR  7/1/2019    OTHER SURGICAL HISTORY      detatched bicep tendon    [place] EVASC RPR DPLMNT AORTO-AORTIC NDGFT N/A 7/1/2019    Procedure: REVISION EVAR WITH AORTIC EXTENSION CUFF X3 WITH [id number] GORE, RIGHT RENAL STENTING WITH 6X16mm iCASt;  Surgeon: Hilton Harada, MD;  Location: [facility];  Service: Vascular       Family History   Problem Relation Age of Onset    Anemia Mother     No Known Problems Father        Social History     Socioeconomic History    Marital status: /Civil Union     Spouse name: Not on file    Number of children: Not on file    Years of education: Not on file    Highest education level: Not on file   Occupational History    Not on file   Social Needs    Financial resource strain: Not on file    Food insecurity:     Worry: Not on file     Inability: Not on file    Transportation needs:     Medical: Not on file     Non-medical: Not on file   Tobacco Use    Smoking status: Former Smoker    Smokeless tobacco: Never Used    Tobacco comment: last assessed 12/06/2017   Substance and Sexual Activity    Alcohol use: Yes     Frequency: 4 or more times a week     Drinks per session: 1 or 2    Drug use: Never    Sexual activity: Not Currently   Lifestyle    Physical activity:     Days per week: Not on file     Minutes per session: Not on file    Stress: Not on file   Relationships    Social connections:     Talks on phone: Not on file     Gets together: Not on file     Attends Sabianism service: Not on file     Active member of club or organization: Not on file     Attends meetings of clubs or organizations: Not on file     Relationship status: Not on file    Intimate partner violence:     Fear of current or ex partner: Not on file     Emotionally abused: Not on file     Physically abused: Not on file     Forced sexual activity: Not on file   Other Topics Concern    Not on file   Social History Narrative    Not on file       Allergies   Allergen Reactions    Sulfamethoxazole-Trimethoprim Nausea Only         Current Outpatient Medications:     acetaminophen (TYLENOL) 325 mg tablet, Take 2 tablets (650 mg total) by mouth every 6 (six) hours as needed for mild pain, Disp: , Rfl:     amLODIPine (NORVASC) 2 5 mg tablet, Take 2 tablets (5 mg total) by mouth daily, Disp: 90 tablet, Rfl: 1    Ascorbic Acid (VITAMIN C PO), Take 1,000 mg by mouth daily, Disp: , Rfl:     aspirin 81 MG tablet, Take 1 tablet by mouth daily, Disp: , Rfl:     atorvastatin (LIPITOR) 40 mg tablet, TAKE 1 TABLET (40 MG TOTAL) BY MOUTH DAILY, Disp: 90 tablet, Rfl: 0    bisoprolol (ZEBETA) 5 mg tablet, TAKE 1 TABLET (5 MG TOTAL BY MOUTH DAILY, Disp: 90 tablet, Rfl: 0    Cholecalciferol (VITAMIN D3) 1000 units CAPS, TAKE 1 CAPSULE MONDAY, WEDNESDAY AND FRIDAY, Disp: 30 capsule, Rfl: 2    ferrous sulfate 325 (65 Fe) mg tablet, Take 1 tablet on Monday, Wednesday & Friday, Disp: 12 tablet, Rfl: 4    folic acid (FOLVITE) 1 mg tablet, Take 1 tablet (1,000 mcg total) by mouth daily, Disp: 90 tablet, Rfl: 1    glucose blood (TRUETRACK TEST) test strip, by In Vitro route 2 (two) times a day, Disp: , Rfl:     indapamide (LOZOL) 2 5 mg tablet, Take 1 tablet (2 5 mg total) by mouth daily, Disp: , Rfl: 0    Lysine HCl 1000 MG TABS, Take 1,000 mg by mouth daily , Disp: , Rfl:     magnesium chloride-calcium (MAG-SR PLUS CALCIUM)  mg, Take 2 tablets by mouth daily, Disp: , Rfl:     multivitamin-minerals (CENTRUM ADULTS) tablet, Take 1 tablet by mouth daily, Disp: , Rfl:     omeprazole (PriLOSEC) 40 MG capsule, take 1 capsule by mouth once daily, Disp: 90 capsule, Rfl: 1    predniSONE 1 MG TBEC, Take 4 mg by mouth daily, Disp: , Rfl:     ramipril (ALTACE) 10 MG capsule, Take 1 capsule (10 mg total) by mouth daily, Disp: 90 capsule, Rfl: 1    tamsulosin (FLOMAX) 0 4 mg, TAKE 1 CAPSULE BY MOUTH DAILY, Disp: 30 capsule, Rfl: 4    Objective     Physical Exam:    General appearance: {general exam:13725}  Skin: {skin exam:09293::"Skin color, texture, turgor normal  No rashes or lesions"}  Neurologic: {neuro exam:80977::"Grossly normal"}  Head: {head exam:44502::"Normocephalic, without obvious abnormality","atraumatic"}  Eyes: {eye exam:201::"conjunctivae/corneas clear  PERRL, EOM's intact  Fundi benign  "}  Throat: {throat exam:58173::"lips, mucosa, and tongue normal; teeth and gums normal"}  Neck: {neck exam:45601::"no adenopathy","no carotid bruit","no JVD","supple, symmetrical, trachea midline","thyroid not enlarged, symmetric, no tenderness/mass/nodules"}  Back: {back exam:801::"symmetric, no curvature   ROM normal  No CVA tenderness "}  Lungs: {lung exam:88716::"clear to auscultation bilaterally"}  Chest wall: {chest exam:37990::"no tenderness"}  Heart: {heart exam:5510::"regular rate and rhythm, S1, S2 normal, no murmur, click, rub or gallop"}  Abdomen: {abdominal exam:43641::"soft, non-tender; bowel sounds normal; no masses,  no organomegaly"}  Extremities: {extremity exam:5109::"extremities normal, warm and well-perfused; no cyanosis, clubbing, or edema"}    Pulse exam:  Radial: Right: {Vascular Pulse Exam:21087} Left[de-identified] {Vascular Pulse Exam:21087}  Ulnar: Right: {Vascular Pulse Exam:21087} Left[de-identified] {Vascular Pulse Exam:21087}  Femoral: Right: {Vascular Pulse Exam:21087} Left: {Vascular Pulse Exam:21087}  Popliteal: Right: {Vascular Pulse Exam:21087} Left: {Vascular Pulse Exam:21087}  DP: Right: {Vascular Pulse Exam:21087} Left: {Vascular Pulse Exam:21087}  PT: Right: {Vascular Pulse Exam:21087} Left: {Vascular Pulse Exam:21087}

## 2019-07-15 NOTE — PATIENT INSTRUCTIONS
DISCHARGE INSTRUCTIONS  ENDOVASCULAR ANEURYSM REPAIR    Following discharge from the hospital, you may have some questions about your operation, your activities or your general condition  These instructions may answer some of your questions and help you adjust during the first few weeks following your operation  ACTIVITY:  Limit your physical activity to slow walking  Avoid climbing or heavy lifting for the first two weeks after surgery  Walking up steps and normal activities may be resumed, as you feel ready  You should not drive a car for three to four days following discharge from the hospital   You may ride in a car upon discharge  DIET:  Resume your normal diet  Try to eat low fat and low cholesterol foods  INCISION:  You may include the operated area in a shower on the second day following surgery  Sitting in a tub is not recommended for the first week following surgery or if you have any open wounds  Your physician may have chosen to use a type of adhesive glue, to close your incision  There are stitches present under the skin which will absorb on their own  The glue is used to cover the incision, assist in closure, and prevent contamination  This adhesive will darken and peel away on its own within one to two weeks  If one is present, you may remove the dressing, band-aid or steri-strips over your wound after two days  Numbness in the region of the incision may occur following the surgery  This normally resolves in six to twelve months  It is normal to have some bruising, swelling or mild discoloration around the incision  If increasing redness or pain develops, call our office immediately  If you notice any active bleeding, apply pressure to the site and call 911 or go to the nearest Emergency Department  [phone] [facility] [phone]  [address] [place] [address] , [place] [address], [place], [address] [place] [address] Rd  [address] [facility] [address], [address], [address] [place] [address], [place], [address]  [address] [facility], [facility],[address], [facility], [address]  [address], [address], [place]  [address], [place], [facility] 97   12[address] [facility], [address] [facility], [address] [place] [address], [address] [place] [address]    FOLLOW UP STUDIES:  Doppler ultrasound studies, CT scans and abdominal x-rays are very important to your post-operative care  Your surgeon will arrange for them at your first postoperative visit         -we will schedule you for a CT angiogram of the abdomen pelvis to assess your endograft and rule out recurrent endoleak, as per postoperative protocol  -will obtain lab work prior to imaging study to confirm stable kidney function  -return to office with Dr Fletcher White after CT angiogram for review of results  -continue aspirin and atorvastatin  -please contact the office in the interim with any questions, concerns or new symptoms

## 2019-07-15 NOTE — ASSESSMENT & PLAN NOTE
27-year-old male former smoker with history of HTN, HLD, CAD, s/p PCI/stent, type 2 DM, CKD 3 (baseline creatinine 1 1-1 2), AAA, s/p EVAR @ [facility] '05 and recent Endoleak w/sac enlargement and graft migration who is now s/p aortic extension cuff x 3 and right renal artery stenting by Dr Pio Álvarez 7/1/2019  Stable renal function postop and at hospital discharge   -doing well, asymptomatic    Femoral access sites clear  -will obtain CTA of abdomen pelvis in 2 weeks as per protocol with follow up in the office with Dr Pio Álvarez  -continue ASA and statin therapy  -instructed to contact the office in the interim with any questions, concerns or new symptoms

## 2019-07-17 ENCOUNTER — OFFICE VISIT (OUTPATIENT)
Dept: VASCULAR SURGERY | Facility: CLINIC | Age: 84
End: 2019-07-17

## 2019-07-17 VITALS
SYSTOLIC BLOOD PRESSURE: 157 MMHG | HEART RATE: 74 BPM | TEMPERATURE: 97.7 F | HEIGHT: 66 IN | WEIGHT: 195 LBS | BODY MASS INDEX: 31.34 KG/M2 | DIASTOLIC BLOOD PRESSURE: 72 MMHG

## 2019-07-17 DIAGNOSIS — IMO0001 ENDOLEAK POST (EVAR) ENDOVASCULAR ANEURYSM REPAIR, INITIAL ENCOUNTER: Primary | Chronic | ICD-10-CM

## 2019-07-17 DIAGNOSIS — Z98.890 POSTOPERATIVE STATE: ICD-10-CM

## 2019-07-17 PROCEDURE — 1111F DSCHRG MED/CURRENT MED MERGE: CPT | Performed by: PHYSICIAN ASSISTANT

## 2019-07-17 PROCEDURE — 99024 POSTOP FOLLOW-UP VISIT: CPT | Performed by: PHYSICIAN ASSISTANT

## 2019-07-17 NOTE — PROGRESS NOTES
Endoleak post (EVAR) endovascular aneurysm repair, initial encounter [facility])  80-year-old male former smoker with history of HTN, HLD, CAD, s/p PCI/stent, type 2 DM, CKD 3 (baseline creatinine 1 1-1 2), AAA, s/p EVAR @ OSH '05 and recent Endoleak w/sac enlargement and graft migration who is now s/p aortic extension cuff x 3 and right renal artery stenting by Dr Eduardo Munroe 7/1/2019  Stable renal function postop and at hospital discharge   -doing well  Femoral access sites clear  -will obtain CTA of abdomen pelvis in 2 weeks as per protocol with follow up in the office with Dr Eduardo Munroe  -continue ASA and statin therapy  -instructed to contact the office in the interim with any questions, concerns or new symptoms        Assessment/Plan   Diagnoses and all orders for this visit:    Endoleak post (EVAR) endovascular aneurysm repair, initial encounter [facility])  -     Basic metabolic panel; Future  -     CTA abdomen pelvis w wo contrast; Future    Postoperative state  -     Basic metabolic panel; Future  -     CTA abdomen pelvis w wo contrast; Future        No chief complaint on file  Subjective   Patient ID: Marco Nichols is a 81 y o  male  Chief complaint: pt is here post op evar done 7/1/ by Dr Eduardo Munroe  Pt denies abd/lower back pain  Pt states groin is dried and intact  Pt is on asa and statin  80-year-old male former smoker with history of HTN, HLD, CAD, s/p PCI/stent, type 2 DM, CKD 3 (baseline creatinine 1 1-1 2), AAA, s/p EVAR @ OSH '05 and recent Endoleak w/sac enlargement and graft migration who is now s/p revision with aortic extension cuff x 3 and right renal artery stenting by Dr Eduardo Munroe 7/1/2019  The patient's postoperative course was unremarkable with stable renal function postop and at hospital discharge  Patient was discharged on 7/2/2019 and returns to the office today for postoperative follow-up    The patient remains asymptomatic and denies fever, chills, abdominal pain, back pain, hematochezia, melena, postprandial abdominal pain, hematuria, claudication, rest pain, tissue loss or groin pain  The following portions of the patient's history were reviewed and updated as appropriate: allergies, current medications, past family history, past medical history, past social history, past surgical history and problem list     Review of Systems   Constitutional: Negative  HENT: Negative  Eyes: Negative  Respiratory: Negative  Cardiovascular: Negative  Gastrointestinal: Negative  Endocrine: Negative  Genitourinary: Negative  Musculoskeletal: Negative  Skin: Negative  Allergic/Immunologic: Negative  Neurological: Negative  Hematological: Negative  Psychiatric/Behavioral: Negative  I have personally reviewed the ROS entered by MA and agree as documented      Patient Active Problem List   Diagnosis    Type 2 diabetes mellitus without complication (HCC)    Peripheral vascular disease [facility] [address] [facility]    AAA (abdominal aortic aneurysm) without rupture ([facility])    Essential hypertension    Pure hypercholesterolemia    End[facility]EV[facility]    History of acute inferior wall MI    Preop cardiovascular exam       Past Surgical History:   Procedure Laterality Date    CARDIAC SURGERY      CATARACT EXTRACTION Bilateral     CORONARY ANGIOPLASTY WITH STENT PLACEMENT      IR EVAR      IR EVAR  7/1/2019    OTHER SURGICAL HISTORY      detatched bicep tendon    NV EVASC RPR DPLMNT AORTO-AORTIC NDGFT N/A 7/1/2019    Procedure: REVISION EVAR WITH AORTIC EXTENSION CUFF X3 WITH 13F15wd GORE, RIGHT RENAL STENTING WITH 6X16mm iCASt;  Surgeon: Misha Boswell MD;  Location: [facility];  Service: Vascular       Family History   Problem Relation Age of Onset    Anemia Mother     No Known Problems Father        Social History     Socioeconomic History    Marital status: /Civil Union     Spouse name: Not on file    Number of children: Not on file    Years of education: Not on file    Highest education level: Not on file   Occupational History    Not on file   Social Needs    Financial resource strain: Not on file    Food insecurity:     Worry: Not on file     Inability: Not on file    Transportation needs:     Medical: Not on file     Non-medical: Not on file   Tobacco Use    Smoking status: Former Smoker    Smokeless tobacco: Never Used    Tobacco comment: last assessed 12/06/2017   Substance and Sexual Activity    Alcohol use: Yes     Frequency: 4 or more times a week     Drinks per session: 1 or 2    Drug use: Never    Sexual activity: Not Currently   Lifestyle    Physical activity:     Days per week: Not on file     Minutes per session: Not on file    Stress: Not on file   Relationships    Social connections:     Talks on phone: Not on file     Gets together: Not on file     Attends Confucianist service: Not on file     Active member of club or organization: Not on file     Attends meetings of clubs or organizations: Not on file     Relationship status: Not on file    Intimate partner violence:     Fear of current or ex partner: Not on file     Emotionally abused: Not on file     Physically abused: Not on file     Forced sexual activity: Not on file   Other Topics Concern    Not on file   Social History Narrative    Not on file       Allergies   Allergen Reactions    Sulfamethoxazole-Trimethoprim Nausea Only         Current Outpatient Medications:     acetaminophen (TYLENOL) 325 mg tablet, Take 2 tablets (650 mg total) by mouth every 6 (six) hours as needed for mild pain, Disp: , Rfl:     amLODIPine (NORVASC) 2 5 mg tablet, Take 2 tablets (5 mg total) by mouth daily, Disp: 90 tablet, Rfl: 1    Ascorbic Acid (VITAMIN C PO), Take 1,000 mg by mouth daily, Disp: , Rfl:     aspirin 81 MG tablet, Take 1 tablet by mouth daily, Disp: , Rfl:     atorvastatin (LIPITOR) 40 mg tablet, TAKE 1 TABLET (40 MG TOTAL) BY MOUTH DAILY, Disp: 90 tablet, Rfl: 0    bisoprolol (ZEBETA) 5 mg tablet, TAKE 1 TABLET (5 MG TOTAL BY MOUTH DAILY, Disp: 90 tablet, Rfl: 0    Cholecalciferol (VITAMIN D3) 1000 units CAPS, TAKE 1 CAPSULE MONDAY, WEDNESDAY AND FRIDAY, Disp: 30 capsule, Rfl: 2    ferrous sulfate 325 (65 Fe) mg tablet, Take 1 tablet on Monday, Wednesday & Friday, Disp: 12 tablet, Rfl: 4    folic acid (FOLVITE) 1 mg tablet, Take 1 tablet (1,000 mcg total) by mouth daily, Disp: 90 tablet, Rfl: 1    glucose blood (TRUETRACK TEST) test strip, by In Vitro route 2 (two) times a day, Disp: , Rfl:     indapamide (LOZOL) 2 5 mg tablet, Take 1 tablet (2 5 mg total) by mouth daily, Disp: , Rfl: 0    Lysine HCl 1000 MG TABS, Take 1,000 mg by mouth daily , Disp: , Rfl:     magnesium chloride-calcium (MAG-SR PLUS CALCIUM)  mg, Take 2 tablets by mouth daily, Disp: , Rfl:     multivitamin-minerals (CENTRUM ADULTS) tablet, Take 1 tablet by mouth daily, Disp: , Rfl:     omeprazole (PriLOSEC) 40 MG capsule, take 1 capsule by mouth once daily, Disp: 90 capsule, Rfl: 1    predniSONE 1 MG TBEC, Take 4 mg by mouth daily, Disp: , Rfl:     ramipril (ALTACE) 10 MG capsule, Take 1 capsule (10 mg total) by mouth daily, Disp: 90 capsule, Rfl: 1    tamsulosin (FLOMAX) 0 4 mg, TAKE 1 CAPSULE BY MOUTH DAILY, Disp: 30 capsule, Rfl: 4    Objective        Physical Exam:    General appearance: alert and oriented, in no acute distress  Lungs: clear to auscultation bilaterally  Heart: regular rate and rhythm, S1, S2 normal, no murmur, click, rub or gallop  Abdomen: soft, non-tender; bowel sounds normal; no masses,  no organomegaly and Nondistended  No abdominal bruits    Extremities: extremities normal, warm and well-perfused; no cyanosis, clubbing, or edema and Bilateral groins clear, dry without erythema, induration, hematoma or pulsatile mass    Pulse exam:  Radial: Right: 2+ Left[de-identified] 2+  Femoral: Right: 2+ Left: 2+  DP: Right: 2+ Left: 2+

## 2019-07-17 NOTE — LETTER
July 17, 2019     Karla Clarke MD  [address] [facility]    Patient: Ema Hernandez   YOB: 1935   Date of Visit: 7/17/2019       Dear Dr Grewal Book:    I had the pleasure seeing your patient, Radha Hall, in postoperative follow-up in the office today related to his recent EVAR revision by Dr Jose A Genao to me for evaluation  Below are my notes for this consultation  If you have questions, please do not hesitate to call me  I look forward to following your patient along with you  Sincerely,        Roberto Carlos Farias PA-C        CC: No Recipients  Roberto Carlos Farias [place]  7/17/2019  9:01 AM  Sign at close encounter  Endoleak post (EVAR) endovascular aneurysm repair, initial encounter [facility])  61-year-old male former smoker with history of HTN, HLD, CAD, s/p PCI/stent, type 2 DM, CKD 3 (baseline creatinine 1 1-1 2), AAA, s/p EVAR @ OSH '05 and recent Endoleak w/sac enlargement and graft migration who is now s/p aortic extension cuff x 3 and right renal artery stenting by Dr Jose A Genao 7/1/2019  Stable renal function postop and at hospital discharge   -doing well  Femoral access sites clear  -will obtain CTA of abdomen pelvis in 2 weeks as per protocol with follow up in the office with Dr Jose A Geano  -continue ASA and statin therapy  -instructed to contact the office in the interim with any questions, concerns or new symptoms        Assessment/Plan   Diagnoses and all orders for this visit:    Endoleak post (EVAR) endovascular aneurysm repair, initial encounter [facility])  -     Basic metabolic panel; Future  -     CTA abdomen pelvis w wo contrast; Future    Postoperative state  -     Basic metabolic panel; Future  -     CTA abdomen pelvis w wo contrast; Future        No chief complaint on file  Subjective   Patient ID: Ema Hernandez is a 81 y o  male  Chief complaint: pt is here post op evar done 7/1/ by Dr Jose A Genao  Pt denies abd/lower back pain   Pt states groin is dried and intact  Pt is on asa and statin  45-year-old male former smoker with history of HTN, HLD, CAD, s/p PCI/stent, type 2 DM, CKD 3 (baseline creatinine 1 1-1 2), AAA, s/p EVAR @ OSH '05 and recent Endoleak w/sac enlargement and graft migration who is now s/p revision with aortic extension cuff x 3 and right renal artery stenting by Dr Victorino Day 7/1/2019  The patient's postoperative course was unremarkable with stable renal function postop and at hospital discharge  Patient was discharged on 7/2/2019 and returns to the office today for postoperative follow-up  The patient remains asymptomatic and denies fever, chills, abdominal pain, back pain, hematochezia, melena, postprandial abdominal pain, hematuria, claudication, rest pain, tissue loss or groin pain  The following portions of the patient's history were reviewed and updated as appropriate: allergies, current medications, past family history, past medical history, past social history, past surgical history and problem list     Review of Systems   Constitutional: Negative  HENT: Negative  Eyes: Negative  Respiratory: Negative  Cardiovascular: Negative  Gastrointestinal: Negative  Endocrine: Negative  Genitourinary: Negative  Musculoskeletal: Negative  Skin: Negative  Allergic/Immunologic: Negative  Neurological: Negative  Hematological: Negative  Psychiatric/Behavioral: Negative  I have personally reviewed the ROS entered by MA and agree as documented      Patient Active Problem List   Diagnosis    Type 2 diabetes mellitus without complication ([facility] [address] )    Peripheral vascular disease ([facility] [address] )    AAA (abdominal aortic aneurysm) without rupture (HCC)    Essential hypertension    Pure hypercholesterolemia    Endoleak post (EVAR) endovascular aneurysm repair, initial encounter ([facility] [address] )    History of acute inferior wall MI    Preop cardiovascular exam       Past Surgical History:   Procedure Laterality Date    CARDIAC SURGERY  CATARACT EXTRACTION Bilateral     CORONARY ANGIOPLASTY WITH STENT PLACEMENT      IR EVAR      IR EVAR  7/1/2019    OTHER SURGICAL HISTORY      detatched bicep tendon    CA EVASC RPR DPLMNT AORTO-AORTIC NDGFT N/A 7/1/2019    Procedure: REVISION EVAR WITH AORTIC EXTENSION CUFF X3 WITH 34B97ax GORE, RIGHT RENAL STENTING WITH 6X16mm iCASt;  Surgeon: Tali Dougherty MD;  [facility];  Service: Vascular       Family History   Problem Relation Age of Onset    Anemia Mother     No Known Problems Father        Social History     Socioeconomic History    Marital status: /Civil Union     Spouse name: Not on file    Number of children: Not on file    Years of education: Not on file    Highest education level: Not on file   Occupational History    Not on file   Social Needs    Financial resource strain: Not on file    Food insecurity:     Worry: Not on file     Inability: Not on file    Transportation needs:     Medical: Not on file     Non-medical: Not on file   Tobacco Use    Smoking status: Former Smoker    Smokeless tobacco: Never Used    Tobacco comment: last assessed 12/06/2017   Substance and Sexual Activity    Alcohol use: Yes     Frequency: 4 or more times a week     Drinks per session: 1 or 2    Drug use: Never    Sexual activity: Not Currently   Lifestyle    Physical activity:     Days per week: Not on file     Minutes per session: Not on file    Stress: Not on file   Relationships    Social connections:     Talks on phone: Not on file     Gets together: Not on file     Attends Scientologist service: Not on file     Active member of club or organization: Not on file     Attends meetings of clubs or organizations: Not on file     Relationship status: Not on file    Intimate partner violence:     Fear of current or ex partner: Not on file     Emotionally abused: Not on file     Physically abused: Not on file     Forced sexual activity: Not on file   Other Topics Concern    Not on file   Social History Narrative    Not on file       Allergies   Allergen Reactions    Sulfamethoxazole-Trimethoprim Nausea Only         Current Outpatient Medications:     acetaminophen (TYLENOL) 325 mg tablet, Take 2 tablets (650 mg total) by mouth every 6 (six) hours as needed for mild pain, Disp: , Rfl:     amLODIPine (NORVASC) 2 5 mg tablet, Take 2 tablets (5 mg total) by mouth daily, Disp: 90 tablet, Rfl: 1    Ascorbic Acid (VITAMIN C PO), Take 1,000 mg by mouth daily, Disp: , Rfl:     aspirin 81 MG tablet, Take 1 tablet by mouth daily, Disp: , Rfl:     atorvastatin (LIPITOR) 40 mg tablet, TAKE 1 TABLET (40 MG TOTAL) BY MOUTH DAILY, Disp: 90 tablet, Rfl: 0    bisoprolol (ZEBETA) 5 mg tablet, TAKE 1 TABLET (5 MG TOTAL BY MOUTH DAILY, Disp: 90 tablet, Rfl: 0    Cholecalciferol (VITAMIN D3) 1000 units CAPS, TAKE 1 CAPSULE MONDAY, WEDNESDAY AND FRIDAY, Disp: 30 capsule, Rfl: 2    ferrous sulfate 325 (65 Fe) mg tablet, Take 1 tablet on Monday, Wednesday & Friday, Disp: 12 tablet, Rfl: 4    folic acid (FOLVITE) 1 mg tablet, Take 1 tablet (1,000 mcg total) by mouth daily, Disp: 90 tablet, Rfl: 1    glucose blood (TRUETRACK TEST) test strip, by In Vitro route 2 (two) times a day, Disp: , Rfl:     indapamide (LOZOL) 2 5 mg tablet, Take 1 tablet (2 5 mg total) by mouth daily, Disp: , Rfl: 0    Lysine HCl 1000 MG TABS, Take 1,000 mg by mouth daily , Disp: , Rfl:     magnesium chloride-calcium (MAG-SR PLUS CALCIUM)  mg, Take 2 tablets by mouth daily, Disp: , Rfl:     multivitamin-minerals (CENTRUM ADULTS) tablet, Take 1 tablet by mouth daily, Disp: , Rfl:     omeprazole (PriLOSEC) 40 MG capsule, take 1 capsule by mouth once daily, Disp: 90 capsule, Rfl: 1    predniSONE 1 MG TBEC, Take 4 mg by mouth daily, Disp: , Rfl:     ramipril (ALTACE) 10 MG capsule, Take 1 capsule (10 mg total) by mouth daily, Disp: 90 capsule, Rfl: 1    tamsulosin (FLOMAX) 0 4 mg, TAKE 1 CAPSULE BY MOUTH DAILY, Disp: 30 capsule, Rfl: 4    Objective        Physical Exam:    General appearance: alert and oriented, in no acute distress  Lungs: clear to auscultation bilaterally  Heart: regular rate and rhythm, S1, S2 normal, no murmur, click, rub or gallop  Abdomen: soft, non-tender; bowel sounds normal; no masses,  no organomegaly and Nondistended  No abdominal bruits    Extremities: extremities normal, warm and well-perfused; no cyanosis, clubbing, or edema and Bilateral groins clear, dry without erythema, induration, hematoma or pulsatile mass    Pulse exam:  Radial: Right: 2+ Left[de-identified] 2+  Femoral: Right: 2+ Left: 2+  DP: Right: 2+ Left: 2+

## 2019-07-25 ENCOUNTER — HOSPITAL ENCOUNTER (OUTPATIENT)
Dept: CT IMAGING | Facility: HOSPITAL | Age: 84
Discharge: HOME | End: 2019-07-25
Payer: COMMERCIAL

## 2019-07-25 DIAGNOSIS — Z98.890 POSTOPERATIVE STATE: ICD-10-CM

## 2019-07-25 DIAGNOSIS — IMO0001 ENDOLEAK POST (EVAR) ENDOVASCULAR ANEURYSM REPAIR, INITIAL ENCOUNTER: ICD-10-CM

## 2019-07-25 LAB
BASOPHILS # BLD AUTO: 0.1 X10E3/UL (ref 0–0.2)
BASOPHILS NFR BLD AUTO: 1 %
BUN SERPL-MCNC: 21 MG/DL (ref 8–27)
BUN/CREAT SERPL: 19 (ref 10–24)
CALCIUM SERPL-MCNC: 8.9 MG/DL (ref 8.6–10.2)
CHLORIDE SERPL-SCNC: 106 MMOL/L (ref 96–106)
CO2 SERPL-SCNC: 24 MMOL/L (ref 20–29)
CREAT SERPL-MCNC: 1.11 MG/DL (ref 0.76–1.27)
EOSINOPHIL # BLD AUTO: 0.3 X10E3/UL (ref 0–0.4)
EOSINOPHIL NFR BLD AUTO: 6 %
ERYTHROCYTE [DISTWIDTH] IN BLOOD BY AUTOMATED COUNT: 14.9 % (ref 12.3–15.4)
GLUCOSE SERPL-MCNC: 108 MG/DL (ref 65–99)
HCT VFR BLD AUTO: 34.8 % (ref 37.5–51)
HGB BLD-MCNC: 11 G/DL (ref 13–17.7)
IMM GRANULOCYTES # BLD: 0 X10E3/UL (ref 0–0.1)
IMM GRANULOCYTES NFR BLD: 0 %
LYMPHOCYTES # BLD AUTO: 1.6 X10E3/UL (ref 0.7–3.1)
LYMPHOCYTES NFR BLD AUTO: 27 %
MCH RBC QN AUTO: 28.4 PG (ref 26.6–33)
MCHC RBC AUTO-ENTMCNC: 31.6 G/DL (ref 31.5–35.7)
MCV RBC AUTO: 90 FL (ref 79–97)
MONOCYTES # BLD AUTO: 0.8 X10E3/UL (ref 0.1–0.9)
MONOCYTES NFR BLD AUTO: 14 %
NEUTROPHILS # BLD AUTO: 3 X10E3/UL (ref 1.4–7)
NEUTROPHILS NFR BLD AUTO: 52 %
PLATELET # BLD AUTO: 209 X10E3/UL (ref 150–450)
POTASSIUM SERPL-SCNC: 4.2 MMOL/L (ref 3.5–5.2)
RBC # BLD AUTO: 3.87 X10E6/UL (ref 4.14–5.8)
SL AMB EGFR AFRICAN AMERICAN: 71 ML/MIN/1.73
SL AMB EGFR NON AFRICAN AMERICAN: 61 ML/MIN/1.73
SL AMB T4, FREE (DIRECT): 1.2 NG/DL (ref 0.82–1.77)
SODIUM SERPL-SCNC: 144 MMOL/L (ref 134–144)
TSH SERPL DL<=0.005 MIU/L-ACNC: 6.01 UIU/ML (ref 0.45–4.5)
WBC # BLD AUTO: 5.7 X10E3/UL (ref 3.4–10.8)

## 2019-07-25 PROCEDURE — 74174 CTA ABD&PLVS W/CONTRAST: CPT

## 2019-07-25 RX ADMIN — IOHEXOL 100 ML: 350 INJECTION, SOLUTION INTRAVENOUS at 14:40

## 2019-07-31 ENCOUNTER — OFFICE VISIT (OUTPATIENT)
Dept: INTERNAL MEDICINE CLINIC | Facility: CLINIC | Age: 84
End: 2019-07-31
Payer: COMMERCIAL

## 2019-07-31 VITALS
WEIGHT: 198 LBS | HEIGHT: 66 IN | TEMPERATURE: 98.5 F | SYSTOLIC BLOOD PRESSURE: 130 MMHG | BODY MASS INDEX: 31.82 KG/M2 | HEART RATE: 74 BPM | RESPIRATION RATE: 14 BRPM | DIASTOLIC BLOOD PRESSURE: 78 MMHG

## 2019-07-31 DIAGNOSIS — IMO0001 ENDOLEAK POST (EVAR) ENDOVASCULAR ANEURYSM REPAIR, INITIAL ENCOUNTER: Chronic | ICD-10-CM

## 2019-07-31 DIAGNOSIS — I10 ESSENTIAL HYPERTENSION: ICD-10-CM

## 2019-07-31 DIAGNOSIS — D64.9 ANEMIA, UNSPECIFIED TYPE: ICD-10-CM

## 2019-07-31 DIAGNOSIS — E11.9 TYPE 2 DIABETES MELLITUS WITHOUT COMPLICATION, WITHOUT LONG-TERM CURRENT USE OF INSULIN (HCC): Primary | ICD-10-CM

## 2019-07-31 DIAGNOSIS — E78.00 PURE HYPERCHOLESTEROLEMIA: ICD-10-CM

## 2019-07-31 PROCEDURE — 3078F DIAST BP <80 MM HG: CPT | Performed by: INTERNAL MEDICINE

## 2019-07-31 PROCEDURE — 1101F PT FALLS ASSESS-DOCD LE1/YR: CPT | Performed by: INTERNAL MEDICINE

## 2019-07-31 PROCEDURE — 99214 OFFICE O/P EST MOD 30 MIN: CPT | Performed by: INTERNAL MEDICINE

## 2019-07-31 PROCEDURE — 1036F TOBACCO NON-USER: CPT | Performed by: INTERNAL MEDICINE

## 2019-07-31 PROCEDURE — 3075F SYST BP GE 130 - 139MM HG: CPT | Performed by: INTERNAL MEDICINE

## 2019-07-31 NOTE — PATIENT INSTRUCTIONS
You did well after your surgery  Continue the same medications  Your blood pressure is control  Will check labs when you come back  Will see her in the fall and give you the flu shot

## 2019-07-31 NOTE — PROGRESS NOTES
Assessment/Plan:  Status post aortic aneurysm stent replacement blood pressure control diabetes control will check labs when he comes back    Problem 1  Diabetes well control with diet modification will check an A1c microalbumin when he comes back  Problem 2  Blood pressure well control he takes amlodipine 2 5 mg he is on ramipril 10 mg and bisoprolol 5 mg per day no change in medications he also takes indapamide 12 5 mg per day  Problem 3  Coronary artery disease stable no angina he is on a statin  Lipitor 40 mg per day no myalgias    Problem 4  Polymyalgia rheumatica he follows up with Rheumatology he is down to prednisone 3 mg per day  He is doing well no morning stiffness    Problem 5  Status post abdominal aortic aneurysm stent replacement  Being followed by vascular doing well  No problem-specific Assessment & Plan notes found for this encounter  Diagnoses and all orders for this visit:    Type 2 diabetes mellitus without complication, without long-term current use of insulin (HCC)  -     Hemoglobin A1C; Future  -     Microalbumin / creatinine urine ratio  -     Magnesium; Future  -     CBC and differential; Future  -     Comprehensive metabolic panel; Future  -     Lipid Panel with Direct LDL reflex; Future  -     TSH, 3rd generation with Free T4 reflex; Future  -     Urinalysis with reflex to microscopic  -     Vitamin D 25 hydroxy; Future  -     Vitamin B12; Future  -     Methylmalonic acid, serum; Future  -     Folate; Future  -     Iron Panel (Includes Iron Saturation, Iron, and TIBC); Future  -     Ferritin; Future    Endoleak post (EVAR) endovascular aneurysm repair, initial encounter ([facility] )  -     Hemoglobin A1C; Future  -     Microalbumin / creatinine urine ratio  -     Magnesium; Future  -     CBC and differential; Future  -     Comprehensive metabolic panel; Future  -     Lipid Panel with Direct LDL reflex; Future  -     TSH, 3rd generation with Free T4 reflex;  Future  - Urinalysis with reflex to microscopic  -     Vitamin D 25 hydroxy; Future  -     Vitamin B12; Future  -     Methylmalonic acid, serum; Future  -     Folate; Future  -     Iron Panel (Includes Iron Saturation, Iron, and TIBC); Future  -     Ferritin; Future    Essential hypertension  -     Hemoglobin A1C; Future  -     Microalbumin / creatinine urine ratio  -     Magnesium; Future  -     CBC and differential; Future  -     Comprehensive metabolic panel; Future  -     Lipid Panel with Direct LDL reflex; Future  -     TSH, 3rd generation with Free T4 reflex; Future  -     Urinalysis with reflex to microscopic  -     Vitamin D 25 hydroxy; Future  -     Vitamin B12; Future  -     Methylmalonic acid, serum; Future  -     Folate; Future  -     Iron Panel (Includes Iron Saturation, Iron, and TIBC); Future  -     Ferritin; Future    Pure hypercholesterolemia  -     Hemoglobin A1C; Future  -     Microalbumin / creatinine urine ratio  -     Magnesium; Future  -     CBC and differential; Future  -     Comprehensive metabolic panel; Future  -     Lipid Panel with Direct LDL reflex; Future  -     TSH, 3rd generation with Free T4 reflex; Future  -     Urinalysis with reflex to microscopic  -     Vitamin D 25 hydroxy; Future  -     Vitamin B12; Future  -     Methylmalonic acid, serum; Future  -     Folate; Future  -     Iron Panel (Includes Iron Saturation, Iron, and TIBC); Future  -     Ferritin; Future    Anemia, unspecified type  -     Hemoglobin A1C; Future  -     Microalbumin / creatinine urine ratio  -     Magnesium; Future  -     CBC and differential; Future  -     Comprehensive metabolic panel; Future  -     Lipid Panel with Direct LDL reflex; Future  -     TSH, 3rd generation with Free T4 reflex; Future  -     Urinalysis with reflex to microscopic  -     Vitamin D 25 hydroxy; Future  -     Vitamin B12; Future  -     Methylmalonic acid, serum; Future  -     Folate;  Future  -     Iron Panel (Includes Iron Saturation, Iron, and TIBC); Future  -     Ferritin; Future          Subjective:      Patient ID: Stevie Wogn is a 81 y o  male  Chief Complaint   Patient presents with    Follow-up     Declined TCM appt on 7 3  19    Needs colonoscopy & DM eye exam            Current Outpatient Medications:     acetaminophen (TYLENOL) 325 mg tablet, Take 2 tablets (650 mg total) by mouth every 6 (six) hours as needed for mild pain, Disp: , Rfl:     amLODIPine (NORVASC) 2 5 mg tablet, Take 2 tablets (5 mg total) by mouth daily, Disp: 90 tablet, Rfl: 1    Ascorbic Acid (VITAMIN C PO), Take 1,000 mg by mouth daily, Disp: , Rfl:     aspirin 81 MG tablet, Take 1 tablet by mouth daily, Disp: , Rfl:     atorvastatin (LIPITOR) 40 mg tablet, TAKE 1 TABLET (40 MG TOTAL) BY MOUTH DAILY, Disp: 90 tablet, Rfl: 0    bisoprolol (ZEBETA) 5 mg tablet, TAKE 1 TABLET (5 MG TOTAL BY MOUTH DAILY, Disp: 90 tablet, Rfl: 0    Cholecalciferol (VITAMIN D3) 1000 units CAPS, TAKE 1 CAPSULE MONDAY, WEDNESDAY AND FRIDAY, Disp: 30 capsule, Rfl: 2    ferrous sulfate 325 (65 Fe) mg tablet, Take 1 tablet on Monday, Wednesday & Friday, Disp: 12 tablet, Rfl: 4    folic acid (FOLVITE) 1 mg tablet, Take 1 tablet (1,000 mcg total) by mouth daily, Disp: 90 tablet, Rfl: 1    glucose blood (TRUETRACK TEST) test strip, by In Vitro route 2 (two) times a day, Disp: , Rfl:     indapamide (LOZOL) 2 5 mg tablet, Take 1 tablet (2 5 mg total) by mouth daily, Disp: , Rfl: 0    Lysine HCl 1000 MG TABS, Take 1,000 mg by mouth daily , Disp: , Rfl:     magnesium chloride-calcium (MAG-SR PLUS CALCIUM)  mg, Take 2 tablets by mouth daily, Disp: , Rfl:     multivitamin-minerals (CENTRUM ADULTS) tablet, Take 1 tablet by mouth daily, Disp: , Rfl:     omeprazole (PriLOSEC) 40 MG capsule, take 1 capsule by mouth once daily, Disp: 90 capsule, Rfl: 1    predniSONE 1 MG TBEC, Take 3 mg by mouth daily, Disp: , Rfl:     ramipril (ALTACE) 10 MG capsule, Take 1 capsule (10 mg total) by mouth daily, Disp: 90 capsule, Rfl: 1    tamsulosin (FLOMAX) 0 4 mg, TAKE 1 CAPSULE BY MOUTH DAILY, Disp: 30 capsule, Rfl: 4    HPI    The following portions of the patient's history were reviewed and updated as appropriate: allergies, current medications, past family history, past medical history, past social history, past surgical history and problem list     Review of Systems   Constitutional: Negative  Negative for activity change, appetite change, fatigue, fever and unexpected weight change  HENT: Negative for congestion, ear pain, hearing loss, mouth sores, postnasal drip, rhinorrhea, sore throat, trouble swallowing and voice change  Eyes: Negative for pain, redness and visual disturbance  Respiratory: Negative for cough, chest tightness, shortness of breath and wheezing  Cardiovascular: Negative for chest pain, palpitations and leg swelling  Gastrointestinal: Negative for abdominal distention, abdominal pain, blood in stool, constipation, diarrhea and nausea  Endocrine: Negative for cold intolerance, heat intolerance, polydipsia, polyphagia and polyuria  Genitourinary: Negative for difficulty urinating, dysuria, flank pain, frequency, hematuria and urgency  Musculoskeletal: Negative for arthralgias, back pain, gait problem, joint swelling and myalgias  Skin: Negative for color change and pallor  Neurological: Negative for dizziness, tremors, seizures, syncope, weakness, numbness and headaches  Hematological: Negative for adenopathy  Does not bruise/bleed easily  Psychiatric/Behavioral: Negative  Negative for sleep disturbance  The patient is not nervous/anxious            Objective:     Results for orders placed or performed in visit on 07/23/19   Tanika Zamudio Default   Result Value Ref Range    White Blood Cell Count 5 7 3 4 - 10 8 x10E3/uL    Red Blood Cell Count 3 87 (L) 4 14 - 5 80 x10E6/uL    Hemoglobin 11 0 (L) 13 0 - 17 7 g/dL    HCT 34 8 (L) 37 5 - 51 0 %    MCV 90 79 - 97 fL MCH 28 4 26 6 - 33 0 pg    MCHC 31 6 31 5 - 35 7 g/dL    RDW 14 9 12 3 - 15 4 %    Platelet Count 257 285 - 450 x10E3/uL    Neutrophils 52 Not Estab  %    Lymphocytes 27 Not Estab  %    Monocytes 14 Not Estab  %    Eosinophils 6 Not Estab  %    Basophils PCT 1 Not Estab  %    Neutrophils (Absolute) 3 0 1 4 - 7 0 x10E3/uL    Lymphocytes (Absolute) 1 6 0 7 - 3 1 x10E3/uL    Monocytes (Absolute) 0 8 0 1 - 0 9 x10E3/uL    Eosinophils (Absolute) 0 3 0 0 - 0 4 x10E3/uL    Basophils ABS 0 1 0 0 - 0 2 x10E3/uL    Immature Granulocytes 0 Not Estab  %    Immature Granulocytes (Absolute) 0 0 0 0 - 0 1 [id number]/NL   Basic metabolic panel   Result Value Ref Range    Glucose, Random 108 (H) 65 - 99 mg/dL    BUN 21 8 - 27 mg/dL    Creatinine 1 11 0 76 - 1 27 mg/dL    eGFR Non  61 >59 mL/min/1 73    eGFR  71 >59 mL/min/1 73    SL AMB BUN/CREATININE RATIO 19 10 - 24    Sodium 144 134 - 144 mmol/L    Potassium 4 2 3 5 - 5 2 mmol/L    Chloride 106 96 - 106 mmol/L    CO2 24 20 - 29 mmol/L    CALCIUM 8 9 8 6 - 10 2 mg/dL   TSH, 3rd generation with Free T4 reflex   Result Value Ref Range    TSH 6 010 (H) 0 450 - 4 500 uIU/mL   T4, free   Result Value Ref Range    T4,Free (Direct) 1 20 0 82 - 1 77 ng/dL       /78 (BP Location: Right arm, Patient Position: Sitting)   Pulse 74   Temp 98 5 °F (36 9 °C)   Resp 14   Ht 5' 6" (1 676 m)   Wt 89 8 kg (198 lb)   BMI 31 96 kg/m²      Physical Exam   Constitutional: He is oriented to person, place, and time  He appears well-developed and well-nourished  HENT:   Head: Normocephalic  Right Ear: External ear normal    Left Ear: External ear normal    Nose: Nose normal    Mouth/Throat: Oropharynx is clear and moist  No oropharyngeal exudate  Eyes: Pupils are equal, round, and reactive to light  Conjunctivae and EOM are normal    Neck: Normal range of motion  Neck supple  No thyromegaly present     Cardiovascular: Normal rate, regular rhythm, normal heart sounds and intact distal pulses  Exam reveals no gallop and no friction rub  No murmur heard  S1-S2 regular rhythm  Extremities no edema  Blood pressure 140/70   Pulmonary/Chest: Effort normal and breath sounds normal  No respiratory distress  He has no wheezes  He has no rales  Lungs are clear no wheezing rales or rhonchi   Abdominal: Soft  Bowel sounds are normal  He exhibits no distension and no mass  There is no tenderness  There is no rebound and no guarding  Abdomen obese soft nontender   Musculoskeletal: Normal range of motion  Lymphadenopathy:     He has no cervical adenopathy  Neurological: He is alert and oriented to person, place, and time  Skin: Skin is warm and dry  Psychiatric: He has a normal mood and affect  His behavior is normal  Judgment normal    Nursing note and vitals reviewed

## 2019-08-01 LAB
LEFT EYE DIABETIC RETINOPATHY: NORMAL
RIGHT EYE DIABETIC RETINOPATHY: NORMAL

## 2019-08-05 DIAGNOSIS — I10 ESSENTIAL HYPERTENSION: ICD-10-CM

## 2019-08-05 RX ORDER — INDAPAMIDE 2.5 MG/1
2.5 TABLET, FILM COATED ORAL DAILY
Qty: 90 TABLET | Refills: 1 | Status: SHIPPED | OUTPATIENT
Start: 2019-08-05 | End: 2020-02-04

## 2019-09-05 DIAGNOSIS — E78.00 PURE HYPERCHOLESTEROLEMIA: ICD-10-CM

## 2019-09-05 DIAGNOSIS — E61.1 LOW IRON: ICD-10-CM

## 2019-09-05 DIAGNOSIS — I10 ESSENTIAL HYPERTENSION: ICD-10-CM

## 2019-09-05 DIAGNOSIS — K21.9 GASTROESOPHAGEAL REFLUX DISEASE WITHOUT ESOPHAGITIS: ICD-10-CM

## 2019-09-05 RX ORDER — FOLIC ACID 1 MG/1
TABLET ORAL
Qty: 90 TABLET | Refills: 1 | Status: SHIPPED | OUTPATIENT
Start: 2019-09-05 | End: 2020-03-02

## 2019-09-05 RX ORDER — BISOPROLOL FUMARATE 5 MG/1
TABLET ORAL
Qty: 90 TABLET | Refills: 0 | Status: SHIPPED | OUTPATIENT
Start: 2019-09-05 | End: 2019-12-02 | Stop reason: SDUPTHER

## 2019-09-05 RX ORDER — FERROUS SULFATE 325(65) MG
TABLET ORAL
Qty: 12 TABLET | Refills: 1 | Status: SHIPPED | OUTPATIENT
Start: 2019-09-05 | End: 2019-11-04 | Stop reason: SDUPTHER

## 2019-09-05 RX ORDER — ATORVASTATIN CALCIUM 40 MG/1
40 TABLET, FILM COATED ORAL DAILY
Qty: 90 TABLET | Refills: 0 | Status: SHIPPED | OUTPATIENT
Start: 2019-09-05 | End: 2019-12-02 | Stop reason: SDUPTHER

## 2019-09-09 NOTE — PROGRESS NOTES
Assessment/Plan:    AAA (abdominal aortic aneurysm) without rupture (HCC)  Abdominal aortic aneurysm status post endovascular stent graft repair requiring revision with extension of the aortic segment with placement of a right renal artery stent  We discussed the findings on recent CT scan which show good stent graft placement with no evidence of endoleak  The aortic sac is stable in size  At this point we will reestablish standard follow-up with periodic duplex and non contrast based CT scan  Diagnoses and all orders for this visit:    AAA (abdominal aortic aneurysm) without rupture (HCC)  -     VAS evar endovascular aortic repair duplex; Future          Subjective:      Patient ID: Bernice Hammond is a 81 y o  male  PT is here to review the results of his CTA Abdomen and Pelvis on 7/25/19  Pt had a EVAR Revision on 7/1/19 by Dr Katie Caicedo  Pt has no abdominal or back pain  Pt is currently taking ASA and Lipitor  81-year-old with remote history of aortic aneurysm repair with AneuRex stent graft  During follow-up it was noted there was 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 with migration of the stent graft and further dilatation of the aortic sac  This was subsequently treated with revision on 07/01/2019 with extension proximally using 2 [place] aortic extension cuffs and placement of a right renal artery stent  On evaluation today he is doing well and has returned to his normal level of activity  He denies any abdominal discomfort  He denies any discomfort at the puncture site in the right groin  CT angiogram 07/25/2019 is reviewed  There is good positioning of the stent graft with patency of the right renal artery stent  There is no evidence of endoleak  Measurements of the aortic sac 6 86 x 6 52 centimeters with comparison to previous CT scan 05/10/2019 at the same position 6 77 x 6 51 centimeters with interval placement of aortic extension and right renal artery stent on 07/01/2019      I have spent 20 minutes with Patient  today in which greater than 50% of this time was spent in counseling/coordination of care regarding Diagnostic results, Patient and family education and Impressions  The following portions of the patient's history were reviewed and updated as appropriate: allergies, current medications, past family history, past medical history, past social history, past surgical history and problem list     The ROS as bellow was reviewed and changes made as indictated       Review of Systems   Constitutional: Negative  HENT: Negative  Eyes: Negative  Respiratory: Negative  Cardiovascular: Negative  Gastrointestinal: Negative  Endocrine: Negative  Genitourinary: Negative  Musculoskeletal: Negative  Skin: Negative  Allergic/Immunologic: Positive for environmental allergies  Neurological: Negative  Hematological: Negative  Psychiatric/Behavioral: Negative            Objective:      /70 (BP Location: Right arm, Patient Position: Sitting, Cuff Size: Adult)   Pulse 66   Temp 97 8 °F (36 6 °C) (Tympanic)   Resp 16   Ht 5' 6" (1 676 m)   Wt 89 4 kg (197 lb)   BMI 31 80 kg/m²          Physical Exam

## 2019-09-10 ENCOUNTER — OFFICE VISIT (OUTPATIENT)
Dept: VASCULAR SURGERY | Facility: CLINIC | Age: 84
End: 2019-09-10

## 2019-09-10 VITALS
HEART RATE: 66 BPM | TEMPERATURE: 97.8 F | HEIGHT: 66 IN | BODY MASS INDEX: 31.66 KG/M2 | SYSTOLIC BLOOD PRESSURE: 122 MMHG | RESPIRATION RATE: 16 BRPM | DIASTOLIC BLOOD PRESSURE: 70 MMHG | WEIGHT: 197 LBS

## 2019-09-10 DIAGNOSIS — I71.4 AAA (ABDOMINAL AORTIC ANEURYSM) WITHOUT RUPTURE (HCC): Primary | Chronic | ICD-10-CM

## 2019-09-10 PROCEDURE — 99024 POSTOP FOLLOW-UP VISIT: CPT | Performed by: SURGERY

## 2019-09-10 NOTE — PATIENT INSTRUCTIONS
AAA (abdominal aortic aneurysm) without rupture (HCC)  Abdominal aortic aneurysm status post endovascular stent graft repair requiring revision with extension of the aortic segment with placement of a right renal artery stent  We discussed the findings on recent CT scan which show good stent graft placement with no evidence of endoleak  The aortic sac is stable in size  At this point we will reestablish standard follow-up with periodic duplex and non contrast based CT scan

## 2019-09-10 NOTE — ASSESSMENT & PLAN NOTE
Abdominal aortic aneurysm status post endovascular stent graft repair requiring revision with extension of the aortic segment with placement of a right renal artery stent  We discussed the findings on recent CT scan which show good stent graft placement with no evidence of endoleak  The aortic sac is stable in size  At this point we will reestablish standard follow-up with periodic duplex and non contrast based CT scan

## 2019-09-10 NOTE — LETTER
September 10, 2019     Sai Clancy MD  [address]    Patient: Natasha Moreau   YOB: 1935   Date of Visit: 9/10/2019       Dear Dr Ramiro Maddox: Thank you for referring Jennifer Crocker to me for evaluation  Below are the relevant portions of my assessment and plan of care  AAA (abdominal aortic aneurysm) without rupture (HCC)  Abdominal aortic aneurysm status post endovascular stent graft repair requiring revision with extension of the aortic segment with placement of a right renal artery stent  We discussed the findings on recent CT scan which show good stent graft placement with no evidence of endoleak  The aortic sac is stable in size  At this point we will reestablish standard follow-up with periodic duplex and non contrast based CT scan  If you have questions, please do not hesitate to call me  I look forward to following Samson Gomes along with you           Sincerely,        Jaxson Myers MD        CC: No Recipients

## 2019-10-28 LAB
ALBUMIN SERPL-MCNC: 4.2 G/DL (ref 3.5–4.7)
ALBUMIN/CREAT UR: 329.9 MG/G CREAT (ref 0–30)
ALBUMIN/GLOB SERPL: 1.8 {RATIO} (ref 1.2–2.2)
ALP SERPL-CCNC: 74 IU/L (ref 39–117)
ALT SERPL-CCNC: 23 IU/L (ref 0–44)
APPEARANCE UR: CLEAR
AST SERPL-CCNC: 24 IU/L (ref 0–40)
BACTERIA URNS QL MICRO: NORMAL
BASOPHILS # BLD AUTO: 0.1 X10E3/UL (ref 0–0.2)
BASOPHILS NFR BLD AUTO: 1 %
BILIRUB SERPL-MCNC: 0.4 MG/DL (ref 0–1.2)
BILIRUB UR QL STRIP: NEGATIVE
BUN SERPL-MCNC: 20 MG/DL (ref 8–27)
BUN/CREAT SERPL: 15 (ref 10–24)
CALCIUM SERPL-MCNC: 9 MG/DL (ref 8.6–10.2)
CHLORIDE SERPL-SCNC: 102 MMOL/L (ref 96–106)
CHOLEST SERPL-MCNC: 146 MG/DL (ref 100–199)
CO2 SERPL-SCNC: 26 MMOL/L (ref 20–29)
COLOR UR: YELLOW
CREAT SERPL-MCNC: 1.32 MG/DL (ref 0.76–1.27)
CREAT UR-MCNC: 78.2 MG/DL
EOSINOPHIL # BLD AUTO: 0.5 X10E3/UL (ref 0–0.4)
EOSINOPHIL NFR BLD AUTO: 10 %
EPI CELLS #/AREA URNS HPF: NORMAL /HPF (ref 0–10)
ERYTHROCYTE [DISTWIDTH] IN BLOOD BY AUTOMATED COUNT: 16.2 % (ref 12.3–15.4)
FERRITIN SERPL-MCNC: 53 NG/ML (ref 30–400)
GLOBULIN SER-MCNC: 2.4 G/DL (ref 1.5–4.5)
GLUCOSE SERPL-MCNC: 116 MG/DL (ref 65–99)
GLUCOSE UR QL: NEGATIVE
HBA1C MFR BLD: 6 % (ref 4.8–5.6)
HCT VFR BLD AUTO: 34.5 % (ref 37.5–51)
HDLC SERPL-MCNC: 46 MG/DL
HGB BLD-MCNC: 11.2 G/DL (ref 13–17.7)
HGB UR QL STRIP: NEGATIVE
IMM GRANULOCYTES # BLD: 0 X10E3/UL (ref 0–0.1)
IMM GRANULOCYTES NFR BLD: 0 %
IRON SATN MFR SERPL: 23 % (ref 15–55)
IRON SERPL-MCNC: 68 UG/DL (ref 38–169)
KETONES UR QL STRIP: NEGATIVE
LDLC SERPL CALC-MCNC: 80 MG/DL (ref 0–99)
LEUKOCYTE ESTERASE UR QL STRIP: NEGATIVE
LYMPHOCYTES # BLD AUTO: 1.6 X10E3/UL (ref 0.7–3.1)
LYMPHOCYTES NFR BLD AUTO: 32 %
MAGNESIUM SERPL-MCNC: 1.6 MG/DL (ref 1.6–2.3)
MCH RBC QN AUTO: 27.7 PG (ref 26.6–33)
MCHC RBC AUTO-ENTMCNC: 32.5 G/DL (ref 31.5–35.7)
MCV RBC AUTO: 85 FL (ref 79–97)
METHYLMALONATE SERPL-SCNC: 191 NMOL/L (ref 0–378)
MICRO URNS: ABNORMAL
MICROALBUMIN UR-MCNC: 258 UG/ML
MONOCYTES # BLD AUTO: 0.6 X10E3/UL (ref 0.1–0.9)
MONOCYTES NFR BLD AUTO: 13 %
NEUTROPHILS # BLD AUTO: 2.2 X10E3/UL (ref 1.4–7)
NEUTROPHILS NFR BLD AUTO: 44 %
NITRITE UR QL STRIP: NEGATIVE
PH UR STRIP: 6 [PH] (ref 5–7.5)
PLATELET # BLD AUTO: 189 X10E3/UL (ref 150–450)
POTASSIUM SERPL-SCNC: 4.6 MMOL/L (ref 3.5–5.2)
PROT SERPL-MCNC: 6.6 G/DL (ref 6–8.5)
PROT UR QL STRIP: ABNORMAL
RBC # BLD AUTO: 4.05 X10E6/UL (ref 4.14–5.8)
RBC #/AREA URNS HPF: NORMAL /HPF (ref 0–2)
SL AMB DISCLAIMER: NORMAL
SL AMB EGFR AFRICAN AMERICAN: 57 ML/MIN/1.73
SL AMB EGFR NON AFRICAN AMERICAN: 50 ML/MIN/1.73
SL AMB T4, FREE (DIRECT): 1.21 NG/DL (ref 0.82–1.77)
SL AMB VLDL CHOLESTEROL CALC: 20 MG/DL (ref 5–40)
SODIUM SERPL-SCNC: 140 MMOL/L (ref 134–144)
SP GR UR: 1.02 (ref 1–1.03)
TIBC SERPL-MCNC: 299 UG/DL (ref 250–450)
TRIGL SERPL-MCNC: 101 MG/DL (ref 0–149)
TSH SERPL DL<=0.005 MIU/L-ACNC: 5.69 UIU/ML (ref 0.45–4.5)
UIBC SERPL-MCNC: 231 UG/DL (ref 111–343)
UROBILINOGEN UR STRIP-ACNC: 0.2 MG/DL (ref 0.2–1)
WBC # BLD AUTO: 4.9 X10E3/UL (ref 3.4–10.8)
WBC #/AREA URNS HPF: NORMAL /HPF (ref 0–5)

## 2019-10-30 ENCOUNTER — OFFICE VISIT (OUTPATIENT)
Dept: INTERNAL MEDICINE CLINIC | Facility: CLINIC | Age: 84
End: 2019-10-30
Payer: COMMERCIAL

## 2019-10-30 VITALS
TEMPERATURE: 97.7 F | BODY MASS INDEX: 32.11 KG/M2 | DIASTOLIC BLOOD PRESSURE: 80 MMHG | SYSTOLIC BLOOD PRESSURE: 130 MMHG | HEIGHT: 66 IN | RESPIRATION RATE: 12 BRPM | WEIGHT: 199.8 LBS | HEART RATE: 80 BPM

## 2019-10-30 DIAGNOSIS — E78.00 PURE HYPERCHOLESTEROLEMIA: ICD-10-CM

## 2019-10-30 DIAGNOSIS — M35.3 POLYMYALGIA RHEUMATICA (HCC): ICD-10-CM

## 2019-10-30 DIAGNOSIS — Z23 ENCOUNTER FOR IMMUNIZATION: ICD-10-CM

## 2019-10-30 DIAGNOSIS — E03.8 SUBCLINICAL HYPOTHYROIDISM: ICD-10-CM

## 2019-10-30 DIAGNOSIS — E11.9 TYPE 2 DIABETES MELLITUS WITHOUT COMPLICATION, WITHOUT LONG-TERM CURRENT USE OF INSULIN (HCC): Primary | ICD-10-CM

## 2019-10-30 DIAGNOSIS — E55.9 VITAMIN D DEFICIENCY: ICD-10-CM

## 2019-10-30 DIAGNOSIS — I25.2 HISTORY OF ACUTE INFERIOR WALL MI: ICD-10-CM

## 2019-10-30 DIAGNOSIS — IMO0001 ENDOLEAK POST (EVAR) ENDOVASCULAR ANEURYSM REPAIR, INITIAL ENCOUNTER: Chronic | ICD-10-CM

## 2019-10-30 PROCEDURE — 99214 OFFICE O/P EST MOD 30 MIN: CPT | Performed by: INTERNAL MEDICINE

## 2019-10-30 PROCEDURE — 90471 IMMUNIZATION ADMIN: CPT | Performed by: INTERNAL MEDICINE

## 2019-10-30 PROCEDURE — 90662 IIV NO PRSV INCREASED AG IM: CPT | Performed by: INTERNAL MEDICINE

## 2019-10-30 NOTE — PROGRESS NOTES
Assessment/Plan:    Problem 1  Diabetes very well control will check a hemoglobin A1c when he comes back  He is now going off the prednisone will help with his weight loss  And also his continue diabetic control    Blood pressure is well controlled no chest palpitation shortness of breath or headache he is on the following medication  Amlodipine 2 5 mg  Bisoprolol 5 mg  Indapamide 2 5 mg  No change in meds  Ramipril 10 mg per day    Renal function has been stable    Hyperlipidemia on atorvastatin 40 mg per day he her lipid profile is stable cholesterol is at goal    GERD and history of gastritis on omeprazole 40 mg per day    Polymyalgia rheumatica has been followed by Rheumatology he is down to 1 mg of prednisone a day hopefully will be tapering it off in the near future  The direction of the rheumatologist    BPH on Flomax seems to be working well for him  Labs were review in detail there is stable  Got the influenza vaccine today    History abdominal aortic aneurysm repair he had a leak he had a transcutaneous repair he is doing quite well being followed by vascular here  No problem-specific Assessment & Plan notes found for this encounter  There are no diagnoses linked to this encounter  Subjective:      BMI Counseling: There is no height or weight on file to calculate BMI  The BMI is above normal  Nutrition recommendations include reducing portion sizes, decreasing overall calorie intake, 3-5 servings of fruits/vegetables daily, reducing fast food intake, consuming healthier snacks, decreasing soda and/or juice intake, moderation in carbohydrate intake, increasing intake of lean protein, reducing intake of saturated fat and trans fat and reducing intake of cholesterol  Exercise recommendations include vigorous aerobic physical activity for 75 minutes/week  Patient ID: Samreen Angelo is a 81 y o  male  No chief complaint on file          Current Outpatient Medications:    acetaminophen (TYLENOL) 325 mg tablet, Take 2 tablets (650 mg total) by mouth every 6 (six) hours as needed for mild pain, Disp: , Rfl:     amLODIPine (NORVASC) 2 5 mg tablet, Take 2 tablets (5 mg total) by mouth daily, Disp: 90 tablet, Rfl: 1    Ascorbic Acid (VITAMIN C PO), Take 1,000 mg by mouth daily, Disp: , Rfl:     aspirin 81 MG tablet, Take 1 tablet by mouth daily, Disp: , Rfl:     atorvastatin (LIPITOR) 40 mg tablet, TAKE 1 TABLET (40 MG TOTAL) BY MOUTH DAILY, Disp: 90 tablet, Rfl: 0    bisoprolol (ZEBETA) 5 mg tablet, TAKE 1 TABLET (5 MG TOTAL) BY MOUTH DAILY, Disp: 90 tablet, Rfl: 0    Cholecalciferol (VITAMIN D3) 1000 units CAPS, TAKE 1 CAPSULE MONDAY, WEDNESDAY AND FRIDAY, Disp: 30 capsule, Rfl: 2    FEROSUL 325 (65 Fe) MG tablet, TAKE 1 TABLET ON MONDAY, WEDNESDAY AND FRIDAY, Disp: 12 tablet, Rfl: 1    folic acid (FOLVITE) 1 mg tablet, take 1 tablet by mouth once daily, Disp: 90 tablet, Rfl: 1    glucose blood (TRUETRACK TEST) test strip, by In Vitro route 2 (two) times a day, Disp: , Rfl:     indapamide (LOZOL) 2 5 mg tablet, Take 1 tablet (2 5 mg total) by mouth daily, Disp: 90 tablet, Rfl: 1    Lysine HCl 1000 MG TABS, Take 1,000 mg by mouth daily , Disp: , Rfl:     magnesium chloride-calcium (MAG-SR PLUS CALCIUM)  mg, Take 2 tablets by mouth daily, Disp: , Rfl:     multivitamin-minerals (CENTRUM ADULTS) tablet, Take 1 tablet by mouth daily, Disp: , Rfl:     omeprazole (PriLOSEC) 40 MG capsule, take 1 capsule by mouth once daily, Disp: 90 capsule, Rfl: 1    predniSONE 1 MG TBEC, Take 3 mg by mouth daily, Disp: , Rfl:     ramipril (ALTACE) 10 MG capsule, Take 1 capsule (10 mg total) by mouth daily, Disp: 90 capsule, Rfl: 1    tamsulosin (FLOMAX) 0 4 mg, TAKE 1 CAPSULE BY MOUTH DAILY, Disp: 30 capsule, Rfl: 4    HPI    The following portions of the patient's history were reviewed and updated as appropriate: allergies, current medications, past family history, past medical history, past social history, past surgical history and problem list     Review of Systems   Constitutional: Negative  Negative for activity change, appetite change, fatigue, fever and unexpected weight change  HENT: Negative for congestion, ear pain, hearing loss, mouth sores, postnasal drip, rhinorrhea, sore throat, trouble swallowing and voice change  Eyes: Negative for pain, redness and visual disturbance  Respiratory: Negative for cough, chest tightness, shortness of breath and wheezing  Cardiovascular: Negative for chest pain, palpitations and leg swelling  Gastrointestinal: Negative for abdominal distention, abdominal pain, blood in stool, constipation, diarrhea and nausea  Endocrine: Negative for cold intolerance, heat intolerance, polydipsia, polyphagia and polyuria  Genitourinary: Negative for difficulty urinating, dysuria, flank pain, frequency, hematuria and urgency  Musculoskeletal: Negative for arthralgias, back pain, gait problem, joint swelling and myalgias  Skin: Negative for color change and pallor  Neurological: Negative for dizziness, tremors, seizures, syncope, weakness, numbness and headaches  Hematological: Negative for adenopathy  Does not bruise/bleed easily  Psychiatric/Behavioral: Negative  Negative for sleep disturbance  The patient is not nervous/anxious            Objective:    Results for orders placed or performed in visit on 10/23/19   CBC and differential   Result Value Ref Range    White Blood Cell Count 4 9 3 4 - 10 8 x10E3/uL    Red Blood Cell Count 4 05 (L) 4 14 - 5 80 x10E6/uL    Hemoglobin 11 2 (L) 13 0 - 17 7 g/dL    HCT 34 5 (L) 37 5 - 51 0 %    MCV 85 79 - 97 fL    MCH 27 7 26 6 - 33 0 pg    MCHC 32 5 31 5 - 35 7 g/dL    RDW 16 2 (H) 12 3 - 15 4 %    Platelet Count 895 714 - 450 x10E3/uL    Neutrophils 44 Not Estab  %    Lymphocytes 32 Not Estab  %    Monocytes 13 Not Estab  %    Eosinophils 10 Not Estab  %    Basophils PCT 1 Not Estab  % Neutrophils (Absolute) 2 2 1 4 - 7 0 x10E3/uL    Lymphocytes (Absolute) 1 6 0 7 - 3 1 x10E3/uL    Monocytes (Absolute) 0 6 0 1 - 0 9 x10E3/uL    Eosinophils (Absolute) 0 5 (H) 0 0 - 0 4 x10E3/uL    Basophils ABS 0 1 0 0 - 0 2 x10E3/uL    Immature Granulocytes 0 Not Estab  %    Immature Granulocytes (Absolute) 0 0 0 0 - 0 1 x10E3/uL   Comprehensive metabolic panel   Result Value Ref Range    Glucose, Random 116 (H) 65 - 99 mg/dL    BUN 20 8 - 27 mg/dL    Creatinine 1 32 (H) 0 76 - 1 27 mg/dL    eGFR Non African American 50 (L) >59 mL/min/1 73    eGFR  57 (L) >59 mL/min/1 73    SL AMB BUN/CREATININE RATIO 15 10 - 24    Sodium 140 134 - 144 mmol/L    Potassium 4 6 3 5 - 5 2 mmol/L    Chloride 102 96 - 106 mmol/L    CO2 26 20 - 29 mmol/L    CALCIUM 9 0 8 6 - 10 2 mg/dL    Protein, Total 6 6 6 0 - 8 5 g/dL    Albumin 4 2 3 5 - 4 7 g/dL    Globulin, Total 2 4 1 5 - 4 5 g/dL    Albumin/Globulin Ratio 1 8 1 2 - 2 2    TOTAL BILIRUBIN 0 4 0 0 - 1 2 mg/dL    Alk Phos Isoenzymes 74 39 - 117 IU/L    AST 24 0 - 40 IU/L    ALT 23 0 - 44 IU/L   Urinalysis with microscopic   Result Value Ref Range    Specific Gravity 1 016 1 005 - 1 030    Ph 6 0 5 0 - 7 5    Color UA Yellow Yellow    Urine Appearance Clear Clear    Leukocyte Esterase Negative Negative    Protein 1+ (A) Negative/Trace    Glucose, 24 HR Urine Negative Negative    Ketone, Urine Negative Negative    Blood, Urine Negative Negative    Bilirubin, Urine Negative Negative    Urobilinogen Urine 0 2 0 2 - 1 0 mg/dL    SL AMB NITRITES URINE, QUAL   Negative Negative    Microscopic Examination See below:    Microscopic Examination   Result Value Ref Range    SL AMB WBC, URINE 0-5 0 - 5 /hpf    RBC, Urine 0-2 0 - 2 /hpf    Epithelial Cells (non renal) 0-10 0 - 10 /hpf    Bacteria, Urine None seen None seen/Few   Lipid Panel with Direct LDL reflex   Result Value Ref Range    Cholesterol, Total 146 100 - 199 mg/dL    Triglycerides 101 0 - 149 mg/dL    HDL 46 >39 mg/dL    VLDL Cholesterol Calculated 20 5 - 40 mg/dL    LDL Direct 80 0 - 99 mg/dL   TIBC   Result Value Ref Range    Total Iron Binding Capacity (TIBC) 299 250 - 450 ug/dL    UIBC 231 111 - 343 ug/dL    Iron, Serum 68 38 - 169 ug/dL    Iron Saturation 23 15 - 55 %   Microalbumin / creatinine urine ratio   Result Value Ref Range    Creatinine, Urine 78 2 Not Estab  mg/dL    Microalbum  ,U,Random 258 0 Not Estab  ug/mL    Microalb/Creat Ratio 329 9 (H) 0 0 - 30 0 mg/g creat   Methylmalonic acid, serum   Result Value Ref Range    Methylmalonic Acid, S 191 0 - 378 nmol/L    Disclaimer: Comment    Hemoglobin A1c (w/out EAG)   Result Value Ref Range    Hemoglobin A1C 6 0 (H) 4 8 - 5 6 %   TSH, 3rd generation with Free T4 reflex   Result Value Ref Range    TSH 5 690 (H) 0 450 - 4 500 uIU/mL   T4, free   Result Value Ref Range    T4,Free (Direct) 1 21 0 82 - 1 77 ng/dL   Magnesium   Result Value Ref Range    Magnesium, Serum 1 6 1 6 - 2 3 mg/dL   Ferritin   Result Value Ref Range    Ferritin 53 30 - 400 ng/mL       There were no vitals taken for this visit  Physical Exam   Constitutional: He is oriented to person, place, and time  He appears well-developed and well-nourished  HENT:   Head: Normocephalic  Right Ear: External ear normal    Left Ear: External ear normal    Nose: Nose normal    Mouth/Throat: Oropharynx is clear and moist  No oropharyngeal exudate  Eyes: Pupils are equal, round, and reactive to light  Conjunctivae and EOM are normal    Neck: Normal range of motion  Neck supple  No thyromegaly present  Cardiovascular: Normal rate, regular rhythm, normal heart sounds and intact distal pulses  Exam reveals no gallop and no friction rub  No murmur heard  S1-S2 regular rhythm  Extremities no edema or calf tenderness   Pulmonary/Chest: Effort normal and breath sounds normal  No respiratory distress  He has no wheezes  He has no rales  Lungs are clear no wheezing rales or rhonchi   Abdominal: Soft  Bowel sounds are normal  He exhibits no distension and no mass  There is no tenderness  There is no rebound and no guarding  Abdomen soft nontender   Musculoskeletal: Normal range of motion  Lymphadenopathy:     He has no cervical adenopathy  Neurological: He is alert and oriented to person, place, and time  Skin: Skin is warm and dry  Psychiatric: He has a normal mood and affect  His behavior is normal  Judgment normal    Nursing note and vitals reviewed

## 2019-10-30 NOTE — PATIENT INSTRUCTIONS
No change in medication blood pressure is control  Anemia stable continue your iron iron levels are normal  Will see her back next year    BMI Counseling: Body mass index is 32 25 kg/m²  The BMI is above normal  Nutrition recommendations include reducing portion sizes, decreasing overall calorie intake, 3-5 servings of fruits/vegetables daily, reducing fast food intake, consuming healthier snacks, decreasing soda and/or juice intake, moderation in carbohydrate intake, increasing intake of lean protein, reducing intake of saturated fat and trans fat and reducing intake of cholesterol  Exercise recommendations include vigorous aerobic physical activity for 75 minutes/week

## 2019-11-04 DIAGNOSIS — N40.1 BENIGN PROSTATIC HYPERPLASIA WITH URINARY FREQUENCY: ICD-10-CM

## 2019-11-04 DIAGNOSIS — R35.0 BENIGN PROSTATIC HYPERPLASIA WITH URINARY FREQUENCY: ICD-10-CM

## 2019-11-04 DIAGNOSIS — E55.9 VITAMIN D DEFICIENCY: ICD-10-CM

## 2019-11-04 DIAGNOSIS — E61.1 LOW IRON: ICD-10-CM

## 2019-11-04 RX ORDER — TAMSULOSIN HYDROCHLORIDE 0.4 MG/1
CAPSULE ORAL
Qty: 30 CAPSULE | Refills: 4 | Status: SHIPPED | OUTPATIENT
Start: 2019-11-04 | End: 2020-04-06

## 2019-11-04 RX ORDER — FERROUS SULFATE 325(65) MG
TABLET ORAL
Qty: 12 TABLET | Refills: 1 | Status: SHIPPED | OUTPATIENT
Start: 2019-11-04 | End: 2020-03-02

## 2019-11-04 RX ORDER — BIOTIN 1 MG
TABLET ORAL
Qty: 30 CAPSULE | Refills: 2 | Status: SHIPPED | OUTPATIENT
Start: 2019-11-04 | End: 2020-05-10

## 2019-12-02 DIAGNOSIS — E78.00 PURE HYPERCHOLESTEROLEMIA: ICD-10-CM

## 2019-12-02 DIAGNOSIS — I10 ESSENTIAL HYPERTENSION: ICD-10-CM

## 2019-12-02 RX ORDER — ATORVASTATIN CALCIUM 40 MG/1
40 TABLET, FILM COATED ORAL DAILY
Qty: 90 TABLET | Refills: 0 | Status: SHIPPED | OUTPATIENT
Start: 2019-12-02 | End: 2020-03-02

## 2019-12-02 RX ORDER — BISOPROLOL FUMARATE 5 MG/1
TABLET ORAL
Qty: 90 TABLET | Refills: 0 | Status: SHIPPED | OUTPATIENT
Start: 2019-12-02 | End: 2020-03-02

## 2019-12-02 RX ORDER — AMLODIPINE BESYLATE 2.5 MG/1
5 TABLET ORAL DAILY
Qty: 90 TABLET | Refills: 1 | Status: SHIPPED | OUTPATIENT
Start: 2019-12-02 | End: 2020-06-10

## 2020-01-06 ENCOUNTER — HOSPITAL ENCOUNTER (OUTPATIENT)
Dept: NON INVASIVE DIAGNOSTICS | Facility: CLINIC | Age: 85
Discharge: HOME | End: 2020-01-06
Payer: COMMERCIAL

## 2020-01-06 DIAGNOSIS — I71.4 AAA (ABDOMINAL AORTIC ANEURYSM) WITHOUT RUPTURE (HCC): Chronic | ICD-10-CM

## 2020-01-06 PROCEDURE — 93978 VASCULAR STUDY: CPT

## 2020-01-07 PROCEDURE — 93978 VASCULAR STUDY: CPT | Performed by: SURGERY

## 2020-01-17 LAB
ALBUMIN/CREAT UR: 317 MG/G CREAT (ref 0–30)
BASOPHILS # BLD AUTO: 0.1 X10E3/UL (ref 0–0.2)
BASOPHILS NFR BLD AUTO: 1 %
BUN SERPL-MCNC: 26 MG/DL (ref 8–27)
BUN/CREAT SERPL: 23 (ref 10–24)
CALCIUM SERPL-MCNC: 9.4 MG/DL (ref 8.6–10.2)
CHLORIDE SERPL-SCNC: 103 MMOL/L (ref 96–106)
CO2 SERPL-SCNC: 24 MMOL/L (ref 20–29)
CREAT SERPL-MCNC: 1.15 MG/DL (ref 0.76–1.27)
CREAT UR-MCNC: 80.8 MG/DL
EOSINOPHIL # BLD AUTO: 0.3 X10E3/UL (ref 0–0.4)
EOSINOPHIL NFR BLD AUTO: 4 %
ERYTHROCYTE [DISTWIDTH] IN BLOOD BY AUTOMATED COUNT: 17.9 % (ref 11.6–15.4)
GLUCOSE SERPL-MCNC: 106 MG/DL (ref 65–99)
HBA1C MFR BLD: 6.4 % (ref 4.8–5.6)
HCT VFR BLD AUTO: 37.5 % (ref 37.5–51)
HGB BLD-MCNC: 12.2 G/DL (ref 13–17.7)
IMM GRANULOCYTES # BLD: 0 X10E3/UL (ref 0–0.1)
IMM GRANULOCYTES NFR BLD: 0 %
LYMPHOCYTES # BLD AUTO: 2 X10E3/UL (ref 0.7–3.1)
LYMPHOCYTES NFR BLD AUTO: 29 %
MAGNESIUM SERPL-MCNC: 1.6 MG/DL (ref 1.6–2.3)
MCH RBC QN AUTO: 28.8 PG (ref 26.6–33)
MCHC RBC AUTO-ENTMCNC: 32.5 G/DL (ref 31.5–35.7)
MCV RBC AUTO: 88 FL (ref 79–97)
MICROALBUMIN UR-MCNC: 256.1 UG/ML
MONOCYTES # BLD AUTO: 0.8 X10E3/UL (ref 0.1–0.9)
MONOCYTES NFR BLD AUTO: 11 %
NEUTROPHILS # BLD AUTO: 3.8 X10E3/UL (ref 1.4–7)
NEUTROPHILS NFR BLD AUTO: 55 %
PLATELET # BLD AUTO: 163 X10E3/UL (ref 150–450)
POTASSIUM SERPL-SCNC: 4.7 MMOL/L (ref 3.5–5.2)
RBC # BLD AUTO: 4.24 X10E6/UL (ref 4.14–5.8)
SL AMB EGFR AFRICAN AMERICAN: 68 ML/MIN/1.73
SL AMB EGFR NON AFRICAN AMERICAN: 59 ML/MIN/1.73
SODIUM SERPL-SCNC: 143 MMOL/L (ref 134–144)
T4 FREE SERPL-MCNC: 1.18 NG/DL (ref 0.82–1.77)
TSH SERPL DL<=0.005 MIU/L-ACNC: 5.23 UIU/ML (ref 0.45–4.5)
WBC # BLD AUTO: 6.9 X10E3/UL (ref 3.4–10.8)

## 2020-01-28 ENCOUNTER — APPOINTMENT (OUTPATIENT)
Dept: LAB | Facility: HOSPITAL | Age: 85
End: 2020-01-28
Attending: INTERNAL MEDICINE
Payer: COMMERCIAL

## 2020-01-28 DIAGNOSIS — E78.00 PURE HYPERCHOLESTEROLEMIA: ICD-10-CM

## 2020-01-28 DIAGNOSIS — M35.3 POLYMYALGIA RHEUMATICA (HCC): ICD-10-CM

## 2020-01-28 DIAGNOSIS — E03.8 SUBCLINICAL HYPOTHYROIDISM: ICD-10-CM

## 2020-01-28 DIAGNOSIS — E11.9 TYPE 2 DIABETES MELLITUS WITHOUT COMPLICATION, WITHOUT LONG-TERM CURRENT USE OF INSULIN (HCC): ICD-10-CM

## 2020-01-28 DIAGNOSIS — E55.9 VITAMIN D DEFICIENCY: ICD-10-CM

## 2020-01-28 DIAGNOSIS — IMO0001 ENDOLEAK POST (EVAR) ENDOVASCULAR ANEURYSM REPAIR, INITIAL ENCOUNTER: Chronic | ICD-10-CM

## 2020-01-28 LAB — HEMOCCULT STL QL IA: POSITIVE

## 2020-01-28 PROCEDURE — G0328 FECAL BLOOD SCRN IMMUNOASSAY: HCPCS

## 2020-02-03 ENCOUNTER — OFFICE VISIT (OUTPATIENT)
Dept: INTERNAL MEDICINE CLINIC | Facility: CLINIC | Age: 85
End: 2020-02-03
Payer: COMMERCIAL

## 2020-02-03 VITALS
HEIGHT: 66 IN | SYSTOLIC BLOOD PRESSURE: 150 MMHG | OXYGEN SATURATION: 97 % | RESPIRATION RATE: 18 BRPM | WEIGHT: 197.4 LBS | BODY MASS INDEX: 31.72 KG/M2 | TEMPERATURE: 97.6 F | DIASTOLIC BLOOD PRESSURE: 72 MMHG | HEART RATE: 62 BPM

## 2020-02-03 DIAGNOSIS — R19.5 GUAIAC POSITIVE STOOLS: ICD-10-CM

## 2020-02-03 DIAGNOSIS — I10 ESSENTIAL HYPERTENSION: ICD-10-CM

## 2020-02-03 DIAGNOSIS — E78.00 PURE HYPERCHOLESTEROLEMIA: ICD-10-CM

## 2020-02-03 DIAGNOSIS — E03.8 SUBCLINICAL HYPOTHYROIDISM: Primary | ICD-10-CM

## 2020-02-03 DIAGNOSIS — M35.3 POLYMYALGIA RHEUMATICA (HCC): ICD-10-CM

## 2020-02-03 DIAGNOSIS — E11.9 TYPE 2 DIABETES MELLITUS WITHOUT COMPLICATION, WITHOUT LONG-TERM CURRENT USE OF INSULIN (HCC): ICD-10-CM

## 2020-02-03 PROCEDURE — 99214 OFFICE O/P EST MOD 30 MIN: CPT | Performed by: INTERNAL MEDICINE

## 2020-02-03 PROCEDURE — 1160F RVW MEDS BY RX/DR IN RCRD: CPT | Performed by: INTERNAL MEDICINE

## 2020-02-03 PROCEDURE — 1036F TOBACCO NON-USER: CPT | Performed by: INTERNAL MEDICINE

## 2020-02-03 PROCEDURE — 3077F SYST BP >= 140 MM HG: CPT | Performed by: INTERNAL MEDICINE

## 2020-02-03 PROCEDURE — 4040F PNEUMOC VAC/ADMIN/RCVD: CPT | Performed by: INTERNAL MEDICINE

## 2020-02-03 PROCEDURE — 3008F BODY MASS INDEX DOCD: CPT | Performed by: INTERNAL MEDICINE

## 2020-02-03 PROCEDURE — 3078F DIAST BP <80 MM HG: CPT | Performed by: INTERNAL MEDICINE

## 2020-02-03 PROCEDURE — 2022F DILAT RTA XM EVC RTNOPTHY: CPT | Performed by: INTERNAL MEDICINE

## 2020-02-03 RX ORDER — BLOOD PRESSURE TEST KIT
KIT MISCELLANEOUS 2 TIMES WEEKLY
Qty: 1 EACH | Refills: 0 | Status: SHIPPED | OUTPATIENT
Start: 2020-02-03

## 2020-02-03 NOTE — PROGRESS NOTES
Assessment/Plan:  Heme-positive stools will repeat the hematoma calls blood pressure labile blood pressure monitoring at home come back in 9 weeks will check it again if is elevated will can increase the amlodipine from 2 5-5 mg per day at the present time this been no change in medications is in good spirits he had a good holiday    positive stools GI referral slight anemia of 12 2 with hematocrit 37    Problem 1  Heme-positive stools I recommend the gastroenterologist he wants to repeat the stools again which is fine the hemoglobin did go from 11-12 with some iron replacement he has no changes in bowel habit he feels well he has no abdominal pain no hematemesis hematochezia he does take a baby aspirin will should be a false positive  Will repeat the hemoccults and make suggestions  Problem 2  Blood pressure little labile I told him to check his blood pressure at home I order a blood pressure cough check it at least twice a week I will see him back in 9 weeks for follow-up for blood pressure control he takes the following medications  Indapamide 2 5 mg  Ramipril 10 mg  Amlodipine 2 5 mg renal function is stable as well as electrolytes    Problem 4  Hyperlipidemia on Lipitor 40 mg per day no myalgias doing well    Problem 5  Diabetes fairly well controlled hemoglobin A1c is 6 5 no polyuria polydipsia he is cutting down his prednisone which it helps with diabetic control he is down to 3 mg per day  Problem 6  For GERD history of peptic ulcer disease continue on omeprazole          No problem-specific Assessment & Plan notes found for this encounter  There are no diagnoses linked to this encounter  Subjective:      Patient ID: Cristo Glover is a 81 y o  male  No chief complaint on file          Current Outpatient Medications:     acetaminophen (TYLENOL) 325 mg tablet, Take 2 tablets (650 mg total) by mouth every 6 (six) hours as needed for mild pain, Disp: , Rfl:     amLODIPine (NORVASC) 2 5 mg tablet, TAKE 2 TABLETS (5 MG TOTAL) BY MOUTH DAILY, Disp: 90 tablet, Rfl: 1    Ascorbic Acid (VITAMIN C PO), Take 1,000 mg by mouth daily, Disp: , Rfl:     aspirin 81 MG tablet, Take 1 tablet by mouth daily, Disp: , Rfl:     atorvastatin (LIPITOR) 40 mg tablet, TAKE 1 TABLET (40 MG TOTAL) BY MOUTH DAILY, Disp: 90 tablet, Rfl: 0    bisoprolol (ZEBETA) 5 mg tablet, TAKE 1 TABLET (5 MG TOTAL) BY MOUTH DAILY, Disp: 90 tablet, Rfl: 0    Cholecalciferol (VITAMIN D3) 25 MCG (1000 UT) CAPS, TAKE 1 CAPSULE MONDAY, WEDNESDAY AND FRIDAY, Disp: 30 capsule, Rfl: 2    FEROSUL 325 (65 Fe) MG tablet, TAKE 1 TABLET ON MONDAY, WEDNESDAY AND FRIDAY, Disp: 12 tablet, Rfl: 1    folic acid (FOLVITE) 1 mg tablet, take 1 tablet by mouth once daily, Disp: 90 tablet, Rfl: 1    glucose blood (TRUETRACK TEST) test strip, by In Vitro route 2 (two) times a day, Disp: , Rfl:     indapamide (LOZOL) 2 5 mg tablet, Take 1 tablet (2 5 mg total) by mouth daily, Disp: 90 tablet, Rfl: 1    Lysine HCl 1000 MG TABS, Take 1,000 mg by mouth daily , Disp: , Rfl:     magnesium chloride-calcium (MAG-SR PLUS CALCIUM)  mg, Take 2 tablets by mouth daily, Disp: , Rfl:     multivitamin-minerals (CENTRUM ADULTS) tablet, Take 1 tablet by mouth daily, Disp: , Rfl:     omeprazole (PriLOSEC) 40 MG capsule, take 1 capsule by mouth once daily, Disp: 90 capsule, Rfl: 1    predniSONE 1 MG TBEC, Take 3 mg by mouth daily, Disp: , Rfl:     ramipril (ALTACE) 10 MG capsule, Take 1 capsule (10 mg total) by mouth daily, Disp: 90 capsule, Rfl: 1    tamsulosin (FLOMAX) 0 4 mg, take 1 capsule by mouth once daily, Disp: 30 capsule, Rfl: 4    HPI    The following portions of the patient's history were reviewed and updated as appropriate: allergies, current medications, past family history, past medical history, past social history, past surgical history and problem list     Review of Systems   Constitutional: Negative    Negative for activity change, appetite change, fatigue, fever and unexpected weight change  HENT: Negative for congestion, ear pain, hearing loss, mouth sores, postnasal drip, rhinorrhea, sore throat, trouble swallowing and voice change  Eyes: Negative for pain, redness and visual disturbance  Respiratory: Negative for cough, chest tightness, shortness of breath and wheezing  Cardiovascular: Negative for chest pain, palpitations and leg swelling  Gastrointestinal: Negative for abdominal distention, abdominal pain, blood in stool, constipation, diarrhea and nausea  Endocrine: Negative for cold intolerance, heat intolerance, polydipsia, polyphagia and polyuria  Genitourinary: Negative for difficulty urinating, dysuria, flank pain, frequency, hematuria and urgency  Musculoskeletal: Negative for arthralgias, back pain, gait problem, joint swelling and myalgias  Skin: Negative for color change and pallor  Neurological: Negative for dizziness, tremors, seizures, syncope, weakness, numbness and headaches  Hematological: Negative for adenopathy  Does not bruise/bleed easily  Psychiatric/Behavioral: Negative  Negative for sleep disturbance  The patient is not nervous/anxious  Objective:    Results for orders placed or performed in visit on 01/28/20   Occult Blood, Fecal Immunochemical   Result Value Ref Range    OCCULT BLD, FECAL IMMUNOLOGICAL Positive (A) Negative       There were no vitals taken for this visit  Physical Exam   Constitutional: He is oriented to person, place, and time  He appears well-developed and well-nourished  HENT:   Head: Normocephalic  Right Ear: External ear normal    Left Ear: External ear normal    Nose: Nose normal    Mouth/Throat: Oropharynx is clear and moist  No oropharyngeal exudate  Eyes: Pupils are equal, round, and reactive to light  Conjunctivae and EOM are normal    Neck: Normal range of motion  Neck supple  No thyromegaly present     Cardiovascular: Normal rate, regular rhythm, normal heart sounds and intact distal pulses  Exam reveals no gallop and no friction rub  No murmur heard  S1-S2 regular rhythm  Extremities no edema  Blood pressure 150/80  140/80 standing   Pulmonary/Chest: Effort normal and breath sounds normal  No respiratory distress  He has no wheezes  He has no rales  Lungs are clear no wheezing rales or rhonchi   Abdominal: Soft  Bowel sounds are normal  He exhibits no distension and no mass  There is no tenderness  There is no rebound and no guarding  Abdomen obese soft nontender   Musculoskeletal: Normal range of motion  Lymphadenopathy:     He has no cervical adenopathy  Neurological: He is alert and oriented to person, place, and time  Skin: Skin is warm and dry  Psychiatric: He has a normal mood and affect  His behavior is normal  Judgment normal    Nursing note and vitals reviewed

## 2020-02-03 NOTE — PATIENT INSTRUCTIONS
Check her stools  Monitor your blood pressure at home twice weekly for the next month or so and bring the results to the office  Your labs are stable including her hemoglobin continue taking the iron as you are but when you decide to collect the stools you need to hold the iron for 1 week

## 2020-02-04 DIAGNOSIS — K21.9 GASTROESOPHAGEAL REFLUX DISEASE WITHOUT ESOPHAGITIS: ICD-10-CM

## 2020-02-04 DIAGNOSIS — I10 ESSENTIAL HYPERTENSION: ICD-10-CM

## 2020-02-04 RX ORDER — OMEPRAZOLE 40 MG/1
CAPSULE, DELAYED RELEASE ORAL
Qty: 90 CAPSULE | Refills: 0 | Status: SHIPPED | OUTPATIENT
Start: 2020-02-04 | End: 2020-04-20

## 2020-02-04 RX ORDER — INDAPAMIDE 2.5 MG/1
2.5 TABLET, FILM COATED ORAL DAILY
Qty: 90 TABLET | Refills: 0 | Status: SHIPPED | OUTPATIENT
Start: 2020-02-04 | End: 2020-04-20

## 2020-02-25 ENCOUNTER — OFFICE VISIT (OUTPATIENT)
Dept: INTERNAL MEDICINE CLINIC | Facility: CLINIC | Age: 85
End: 2020-02-25
Payer: COMMERCIAL

## 2020-02-25 VITALS
BODY MASS INDEX: 31.98 KG/M2 | RESPIRATION RATE: 13 BRPM | DIASTOLIC BLOOD PRESSURE: 70 MMHG | SYSTOLIC BLOOD PRESSURE: 140 MMHG | HEART RATE: 70 BPM | HEIGHT: 66 IN | WEIGHT: 199 LBS | TEMPERATURE: 98.4 F

## 2020-02-25 DIAGNOSIS — I10 ESSENTIAL HYPERTENSION: ICD-10-CM

## 2020-02-25 DIAGNOSIS — B35.1 ONYCHOMYCOSIS DUE TO DERMATOPHYTE: ICD-10-CM

## 2020-02-25 DIAGNOSIS — M35.3 POLYMYALGIA RHEUMATICA (HCC): ICD-10-CM

## 2020-02-25 DIAGNOSIS — E11.9 TYPE 2 DIABETES MELLITUS WITHOUT COMPLICATION, WITHOUT LONG-TERM CURRENT USE OF INSULIN (HCC): Primary | ICD-10-CM

## 2020-02-25 DIAGNOSIS — R19.5 GUAIAC POSITIVE STOOLS: ICD-10-CM

## 2020-02-25 DIAGNOSIS — E78.00 PURE HYPERCHOLESTEROLEMIA: ICD-10-CM

## 2020-02-25 PROCEDURE — 3008F BODY MASS INDEX DOCD: CPT | Performed by: INTERNAL MEDICINE

## 2020-02-25 PROCEDURE — 4040F PNEUMOC VAC/ADMIN/RCVD: CPT | Performed by: INTERNAL MEDICINE

## 2020-02-25 PROCEDURE — 3078F DIAST BP <80 MM HG: CPT | Performed by: INTERNAL MEDICINE

## 2020-02-25 PROCEDURE — 3077F SYST BP >= 140 MM HG: CPT | Performed by: INTERNAL MEDICINE

## 2020-02-25 PROCEDURE — 99213 OFFICE O/P EST LOW 20 MIN: CPT | Performed by: INTERNAL MEDICINE

## 2020-02-25 PROCEDURE — 2022F DILAT RTA XM EVC RTNOPTHY: CPT | Performed by: INTERNAL MEDICINE

## 2020-02-25 PROCEDURE — 1160F RVW MEDS BY RX/DR IN RCRD: CPT | Performed by: INTERNAL MEDICINE

## 2020-02-25 PROCEDURE — 1036F TOBACCO NON-USER: CPT | Performed by: INTERNAL MEDICINE

## 2020-02-25 RX ORDER — KETOCONAZOLE 20 MG/G
CREAM TOPICAL DAILY
Qty: 15 G | Refills: 0 | Status: SHIPPED | OUTPATIENT
Start: 2020-02-25

## 2020-02-25 NOTE — PROGRESS NOTES
Assessment/Plan:    Heme-positive stools needs GI evaluation    He came in urgently because he had spot on his dorsum of the left foot it looks like a fungal skin infection  Will going to give her med cream  nizoral to apply daily    He has some varicosis disease good pulses  Denies any calf pain or swelling no fever chills    Diabetes is fairly well control no polyuria polydipsia  Heme-positive stool he knows he has to go for colonoscopy  Blood pressure vascular disease well control  No problem-specific Assessment & Plan notes found for this encounter  There are no diagnoses linked to this encounter  Subjective:      Patient ID: Shimon Callejas is a 81 y o  male  No chief complaint on file          Current Outpatient Medications:     acetaminophen (TYLENOL) 325 mg tablet, Take 2 tablets (650 mg total) by mouth every 6 (six) hours as needed for mild pain, Disp: , Rfl:     amLODIPine (NORVASC) 2 5 mg tablet, TAKE 2 TABLETS (5 MG TOTAL) BY MOUTH DAILY, Disp: 90 tablet, Rfl: 1    Ascorbic Acid (VITAMIN C PO), Take 1,000 mg by mouth daily, Disp: , Rfl:     aspirin 81 MG tablet, Take 1 tablet by mouth daily, Disp: , Rfl:     atorvastatin (LIPITOR) 40 mg tablet, TAKE 1 TABLET (40 MG TOTAL) BY MOUTH DAILY, Disp: 90 tablet, Rfl: 0    bisoprolol (ZEBETA) 5 mg tablet, TAKE 1 TABLET (5 MG TOTAL) BY MOUTH DAILY, Disp: 90 tablet, Rfl: 0    Blood Pressure KIT, by Does not apply route 2 (two) times a week, Disp: 1 each, Rfl: 0    Cholecalciferol (VITAMIN D3) 25 MCG (1000 UT) CAPS, TAKE 1 CAPSULE MONDAY, WEDNESDAY AND FRIDAY, Disp: 30 capsule, Rfl: 2    FEROSUL 325 (65 Fe) MG tablet, TAKE 1 TABLET ON MONDAY, WEDNESDAY AND FRIDAY, Disp: 12 tablet, Rfl: 1    folic acid (FOLVITE) 1 mg tablet, take 1 tablet by mouth once daily, Disp: 90 tablet, Rfl: 1    glucose blood (TRUETRACK TEST) test strip, by In Vitro route 2 (two) times a day, Disp: , Rfl:     indapamide (LOZOL) 2 5 mg tablet, TAKE 1 TABLET (2 5 MG TOTAL) BY MOUTH DAILY, Disp: 90 tablet, Rfl: 0    Lysine HCl 1000 MG TABS, Take 1,000 mg by mouth daily , Disp: , Rfl:     magnesium chloride-calcium (MAG-SR PLUS CALCIUM)  mg, Take 2 tablets by mouth daily, Disp: , Rfl:     multivitamin-minerals (CENTRUM ADULTS) tablet, Take 1 tablet by mouth daily, Disp: , Rfl:     omeprazole (PriLOSEC) 40 MG capsule, take 1 capsule by mouth once daily, Disp: 90 capsule, Rfl: 0    predniSONE 1 MG TBEC, Take 3 mg by mouth daily, Disp: , Rfl:     ramipril (ALTACE) 10 MG capsule, Take 1 capsule (10 mg total) by mouth daily, Disp: 90 capsule, Rfl: 1    tamsulosin (FLOMAX) 0 4 mg, take 1 capsule by mouth once daily, Disp: 30 capsule, Rfl: 4    HPI    The following portions of the patient's history were reviewed and updated as appropriate: allergies, current medications, past family history, past medical history, past social history, past surgical history and problem list     Review of Systems   Constitutional: Negative  Negative for activity change, appetite change, fatigue, fever and unexpected weight change  HENT: Negative for congestion, ear pain, hearing loss, mouth sores, postnasal drip, rhinorrhea, sore throat, trouble swallowing and voice change  Eyes: Negative for pain, redness and visual disturbance  Respiratory: Negative for cough, chest tightness, shortness of breath and wheezing  Cardiovascular: Negative for chest pain, palpitations and leg swelling  Gastrointestinal: Negative for abdominal distention, abdominal pain, blood in stool, constipation, diarrhea and nausea  Endocrine: Negative for cold intolerance, heat intolerance, polydipsia, polyphagia and polyuria  Genitourinary: Negative for difficulty urinating, dysuria, flank pain, frequency, hematuria and urgency  Musculoskeletal: Negative for arthralgias, back pain, gait problem, joint swelling and myalgias  Skin: Positive for rash  Negative for color change and pallor     Neurological: Negative for dizziness, tremors, seizures, syncope, weakness, numbness and headaches  Hematological: Negative for adenopathy  Does not bruise/bleed easily  Psychiatric/Behavioral: Negative  Negative for sleep disturbance  The patient is not nervous/anxious  Objective:    Results for orders placed or performed in visit on 01/28/20   Occult Blood, Fecal Immunochemical   Result Value Ref Range    OCCULT BLD, FECAL IMMUNOLOGICAL Positive (A) Negative       There were no vitals taken for this visit  Physical Exam   Constitutional: He is oriented to person, place, and time  He appears well-developed and well-nourished  HENT:   Head: Normocephalic  Right Ear: External ear normal    Left Ear: External ear normal    Nose: Nose normal    Mouth/Throat: Oropharynx is clear and moist  No oropharyngeal exudate  Eyes: Pupils are equal, round, and reactive to light  Conjunctivae and EOM are normal    Neck: Normal range of motion  Neck supple  No thyromegaly present  Cardiovascular: Normal rate, regular rhythm, normal heart sounds and intact distal pulses  Exam reveals no gallop and no friction rub  No murmur heard  S1-S2 regular rhythm  Extremities no edema   Pulmonary/Chest: Effort normal and breath sounds normal  No respiratory distress  He has no wheezes  He has no rales  Abdominal: Soft  Bowel sounds are normal  He exhibits no distension and no mass  There is no tenderness  There is no rebound and no guarding  Musculoskeletal: Normal range of motion  Lymphadenopathy:     He has no cervical adenopathy  Neurological: He is alert and oriented to person, place, and time  Skin: Skin is warm and dry  Circular rash in the dorsum of the left foot  Looks like a fungal skin infection  Psychiatric: He has a normal mood and affect  His behavior is normal  Judgment normal    Nursing note and vitals reviewed

## 2020-02-25 NOTE — PATIENT INSTRUCTIONS
Skin Yeast Infection   WHAT YOU NEED TO KNOW:   Yeast is normally present on the skin  Infection happens when you have too much yeast, or when it gets into a cut on your skin  Certain types of mold and fungus can cause a yeast infection  A skin yeast infection can appear anywhere on your skin or nail beds  Skin yeast infections are usually found on warm, moist parts of the body  Examples include between skin folds or under the breasts  DISCHARGE INSTRUCTIONS:   Return to the emergency department if:   · You have signs of infection, such as pus, warmth or red streaks coming from the wound, or a fever  Contact your healthcare provider if:   · Your symptoms worsen or do not get better within 7 to 10 days  · You have new or returning signs of a skin yeast infection after treatment  · You have questions or concerns about your condition or care  Medicines:   · Antifungal medicine  may be given as a cream, ointment, or pill  · Take your medicine as directed  Contact your healthcare provider if you think your medicine is not helping or if you have side effects  Tell him or her if you are allergic to any medicine  Keep a list of the medicines, vitamins, and herbs you take  Include the amounts, and when and why you take them  Bring the list or the pill bottles to follow-up visits  Carry your medicine list with you in case of an emergency  Care for the skin near the infection:  You may only have discolored patches of skin, or areas that are dry and flaking  Care for these skin problems as directed by your healthcare provider  If you have painful skin or an open sore, you will need to protect the skin and prevent damage  You will also need to keep the skin dry as much as possible  Ask your healthcare provider how to care for your skin while the infection clears  The following are general guidelines for caring for painful or open skin:  · Keep the skin clean    Ask your healthcare provider if you should wash with mild soap and water  Do not use soap that contains alcohol  Alcohol can dry and irritate the skin and make symptoms worse  Your baby's healthcare provider may tell you to use diaper cream or ointment when you change his diaper  This will protect the skin and prevent moisture from collecting  · Keep the skin dry  Pat the area dry with a towel  Do not rub, because this may irritate the skin  If you have a skin yeast infection between skin folds, lift the top part gently and hold it while you dry between your skin folds  Always dry your feet completely after you swim or bathe, including between your toes  Dry your skin if you are sweating from exercise or exposure to heat  Use a clean towel each time to prevent spreading or continuing the infection  · Keep the skin protected  Ask your healthcare provider if you should cover the area with a bandage or leave it open  Check your skin each day to make sure you do not have new or worsening problems  You may need to have someone check the skin if you cannot see the area easily  Prevent another skin yeast infection:   · Do not share clothing or towels    · Wear shower shoes if you need to use a public shower    · Dry your feet completely after you bathe, and apply antifungal powder or cream as directed    · Put on socks before you get dressed so you do not spread fungus from your feet    · Wear light clothing that allows air to get to your skin    · Manage your weight to prevent skin folds where yeast can collect    · Manage diabetes    · Change your baby's diaper often, and keep the area clean and dry as much as possible    · Use a diaper cream or ointment that contains zinc oxide or dimethicone on your baby's diaper area as directed  Follow up with your healthcare provider as directed:  Write down your questions so you remember to ask them during your visits     © 2017 [email or web address] Information is for End User's use only and may not be sold, redistributed or otherwise used for commercial purposes  All illustrations and images included in CareNotes® are the copyrighted property of [employer]  or [employer]  The above information is an  only  It is not intended as medical advice for individual conditions or treatments  Talk to your doctor, nurse or pharmacist before following any medical regimen to see if it is safe and effective for you  I think you have a little of fungal skin infection  So will going to do the following will going to order a cream that you can put once or twice a day in the area after your shower or bathe every day    Let us know how you're doing a should be better with dinner of a week or 2

## 2020-03-01 DIAGNOSIS — E61.1 LOW IRON: ICD-10-CM

## 2020-03-01 DIAGNOSIS — K21.9 GASTROESOPHAGEAL REFLUX DISEASE WITHOUT ESOPHAGITIS: ICD-10-CM

## 2020-03-01 DIAGNOSIS — I10 ESSENTIAL HYPERTENSION: ICD-10-CM

## 2020-03-01 DIAGNOSIS — E78.00 PURE HYPERCHOLESTEROLEMIA: ICD-10-CM

## 2020-03-02 RX ORDER — FOLIC ACID 1 MG/1
TABLET ORAL
Qty: 90 TABLET | Refills: 1 | Status: SHIPPED | OUTPATIENT
Start: 2020-03-02 | End: 2020-08-15

## 2020-03-02 RX ORDER — FERROUS SULFATE 325(65) MG
TABLET ORAL
Qty: 12 TABLET | Refills: 1 | Status: SHIPPED | OUTPATIENT
Start: 2020-03-02 | End: 2020-05-10

## 2020-03-02 RX ORDER — BISOPROLOL FUMARATE 5 MG/1
TABLET ORAL
Qty: 90 TABLET | Refills: 0 | Status: SHIPPED | OUTPATIENT
Start: 2020-03-02 | End: 2020-05-10

## 2020-03-02 RX ORDER — ATORVASTATIN CALCIUM 40 MG/1
40 TABLET, FILM COATED ORAL DAILY
Qty: 90 TABLET | Refills: 0 | Status: SHIPPED | OUTPATIENT
Start: 2020-03-02 | End: 2020-05-10

## 2020-04-06 DIAGNOSIS — R35.0 BENIGN PROSTATIC HYPERPLASIA WITH URINARY FREQUENCY: ICD-10-CM

## 2020-04-06 DIAGNOSIS — N40.1 BENIGN PROSTATIC HYPERPLASIA WITH URINARY FREQUENCY: ICD-10-CM

## 2020-04-06 DIAGNOSIS — I10 ESSENTIAL HYPERTENSION: ICD-10-CM

## 2020-04-06 RX ORDER — RAMIPRIL 10 MG/1
10 CAPSULE ORAL DAILY
Qty: 90 CAPSULE | Refills: 1 | Status: SHIPPED | OUTPATIENT
Start: 2020-04-06 | End: 2020-09-14

## 2020-04-06 RX ORDER — TAMSULOSIN HYDROCHLORIDE 0.4 MG/1
CAPSULE ORAL
Qty: 30 CAPSULE | Refills: 4 | Status: SHIPPED | OUTPATIENT
Start: 2020-04-06 | End: 2020-09-14

## 2020-04-20 DIAGNOSIS — K21.9 GASTROESOPHAGEAL REFLUX DISEASE WITHOUT ESOPHAGITIS: ICD-10-CM

## 2020-04-20 DIAGNOSIS — I10 ESSENTIAL HYPERTENSION: ICD-10-CM

## 2020-04-20 RX ORDER — OMEPRAZOLE 40 MG/1
CAPSULE, DELAYED RELEASE ORAL
Qty: 90 CAPSULE | Refills: 0 | Status: SHIPPED | OUTPATIENT
Start: 2020-04-20 | End: 2020-08-15

## 2020-04-20 RX ORDER — INDAPAMIDE 2.5 MG/1
2.5 TABLET, FILM COATED ORAL DAILY
Qty: 90 TABLET | Refills: 0 | Status: SHIPPED | OUTPATIENT
Start: 2020-04-20 | End: 2020-08-15

## 2020-05-09 DIAGNOSIS — E55.9 VITAMIN D DEFICIENCY: ICD-10-CM

## 2020-05-09 DIAGNOSIS — E78.00 PURE HYPERCHOLESTEROLEMIA: ICD-10-CM

## 2020-05-09 DIAGNOSIS — I10 ESSENTIAL HYPERTENSION: ICD-10-CM

## 2020-05-09 DIAGNOSIS — E61.1 LOW IRON: ICD-10-CM

## 2020-05-10 RX ORDER — BIOTIN 1 MG
TABLET ORAL
Qty: 30 CAPSULE | Refills: 2 | Status: SHIPPED | OUTPATIENT
Start: 2020-05-10 | End: 2020-12-20

## 2020-05-10 RX ORDER — FERROUS SULFATE 325(65) MG
TABLET ORAL
Qty: 12 TABLET | Refills: 1 | Status: SHIPPED | OUTPATIENT
Start: 2020-05-10 | End: 2020-08-15

## 2020-05-10 RX ORDER — BISOPROLOL FUMARATE 5 MG/1
TABLET ORAL
Qty: 90 TABLET | Refills: 0 | Status: SHIPPED | OUTPATIENT
Start: 2020-05-10 | End: 2020-08-15

## 2020-05-10 RX ORDER — ATORVASTATIN CALCIUM 40 MG/1
TABLET, FILM COATED ORAL
Qty: 90 TABLET | Refills: 0 | Status: SHIPPED | OUTPATIENT
Start: 2020-05-10 | End: 2020-08-15

## 2020-05-20 ENCOUNTER — TELEMEDICINE (OUTPATIENT)
Dept: INTERNAL MEDICINE CLINIC | Facility: CLINIC | Age: 85
End: 2020-05-20
Payer: COMMERCIAL

## 2020-05-20 DIAGNOSIS — E78.00 PURE HYPERCHOLESTEROLEMIA: ICD-10-CM

## 2020-05-20 DIAGNOSIS — E03.8 SUBCLINICAL HYPOTHYROIDISM: Primary | ICD-10-CM

## 2020-05-20 DIAGNOSIS — I73.9 PERIPHERAL VASCULAR DISEASE (HCC): ICD-10-CM

## 2020-05-20 DIAGNOSIS — L30.9 ECZEMA, UNSPECIFIED TYPE: ICD-10-CM

## 2020-05-20 DIAGNOSIS — M35.3 POLYMYALGIA RHEUMATICA (HCC): ICD-10-CM

## 2020-05-20 DIAGNOSIS — I10 ESSENTIAL HYPERTENSION: ICD-10-CM

## 2020-05-20 DIAGNOSIS — E11.9 TYPE 2 DIABETES MELLITUS WITHOUT COMPLICATION, WITHOUT LONG-TERM CURRENT USE OF INSULIN (HCC): ICD-10-CM

## 2020-05-20 PROBLEM — E11.51 TYPE II DIABETES MELLITUS WITH PERIPHERAL CIRCULATORY DISORDER (HCC): Status: ACTIVE | Noted: 2018-02-21

## 2020-05-20 PROCEDURE — 1160F RVW MEDS BY RX/DR IN RCRD: CPT | Performed by: INTERNAL MEDICINE

## 2020-05-20 PROCEDURE — 99214 OFFICE O/P EST MOD 30 MIN: CPT | Performed by: INTERNAL MEDICINE

## 2020-05-20 RX ORDER — CLOTRIMAZOLE AND BETAMETHASONE DIPROPIONATE 10; .64 MG/G; MG/G
CREAM TOPICAL 2 TIMES DAILY
Qty: 30 G | Refills: 1 | Status: SHIPPED | OUTPATIENT
Start: 2020-05-20 | End: 2020-09-14

## 2020-06-10 DIAGNOSIS — I10 ESSENTIAL HYPERTENSION: ICD-10-CM

## 2020-06-10 RX ORDER — AMLODIPINE BESYLATE 2.5 MG/1
5 TABLET ORAL DAILY
Qty: 90 TABLET | Refills: 1 | Status: SHIPPED | OUTPATIENT
Start: 2020-06-10 | End: 2020-11-04

## 2020-07-10 LAB
ALBUMIN SERPL-MCNC: 4.5 G/DL (ref 3.6–4.6)
ALBUMIN/GLOB SERPL: 1.8 {RATIO} (ref 1.2–2.2)
ALP SERPL-CCNC: 77 IU/L (ref 39–117)
ALT SERPL-CCNC: 28 IU/L (ref 0–44)
AST SERPL-CCNC: 28 IU/L (ref 0–40)
BASOPHILS # BLD AUTO: 0.1 X10E3/UL (ref 0–0.2)
BASOPHILS NFR BLD AUTO: 1 %
BILIRUB SERPL-MCNC: 0.4 MG/DL (ref 0–1.2)
BUN SERPL-MCNC: 21 MG/DL (ref 8–27)
BUN/CREAT SERPL: 17 (ref 10–24)
CALCIUM SERPL-MCNC: 9.2 MG/DL (ref 8.6–10.2)
CHLORIDE SERPL-SCNC: 104 MMOL/L (ref 96–106)
CHOLEST SERPL-MCNC: 147 MG/DL (ref 100–199)
CO2 SERPL-SCNC: 24 MMOL/L (ref 20–29)
CREAT SERPL-MCNC: 1.27 MG/DL (ref 0.76–1.27)
EOSINOPHIL # BLD AUTO: 0.4 X10E3/UL (ref 0–0.4)
EOSINOPHIL NFR BLD AUTO: 6 %
ERYTHROCYTE [DISTWIDTH] IN BLOOD BY AUTOMATED COUNT: 14.4 % (ref 11.6–15.4)
GLOBULIN SER-MCNC: 2.5 G/DL (ref 1.5–4.5)
GLUCOSE SERPL-MCNC: 127 MG/DL (ref 65–99)
HBA1C MFR BLD: 6 % (ref 4.8–5.6)
HCT VFR BLD AUTO: 37.1 % (ref 37.5–51)
HDLC SERPL-MCNC: 47 MG/DL
HGB BLD-MCNC: 12.1 G/DL (ref 13–17.7)
IMM GRANULOCYTES # BLD: 0 X10E3/UL (ref 0–0.1)
IMM GRANULOCYTES NFR BLD: 1 %
LDLC SERPL CALC-MCNC: 83 MG/DL (ref 0–99)
LYMPHOCYTES # BLD AUTO: 1.6 X10E3/UL (ref 0.7–3.1)
LYMPHOCYTES NFR BLD AUTO: 27 %
MAGNESIUM SERPL-MCNC: 1.6 MG/DL (ref 1.6–2.3)
MCH RBC QN AUTO: 28.8 PG (ref 26.6–33)
MCHC RBC AUTO-ENTMCNC: 32.6 G/DL (ref 31.5–35.7)
MCV RBC AUTO: 88 FL (ref 79–97)
MONOCYTES # BLD AUTO: 0.7 X10E3/UL (ref 0.1–0.9)
MONOCYTES NFR BLD AUTO: 13 %
NEUTROPHILS # BLD AUTO: 3 X10E3/UL (ref 1.4–7)
NEUTROPHILS NFR BLD AUTO: 52 %
PLATELET # BLD AUTO: 208 X10E3/UL (ref 150–450)
POTASSIUM SERPL-SCNC: 4.5 MMOL/L (ref 3.5–5.2)
PROT SERPL-MCNC: 7 G/DL (ref 6–8.5)
RBC # BLD AUTO: 4.2 X10E6/UL (ref 4.14–5.8)
SL AMB EGFR AFRICAN AMERICAN: 60 ML/MIN/1.73
SL AMB EGFR NON AFRICAN AMERICAN: 52 ML/MIN/1.73
SL AMB VLDL CHOLESTEROL CALC: 17 MG/DL (ref 5–40)
SODIUM SERPL-SCNC: 142 MMOL/L (ref 134–144)
TRIGL SERPL-MCNC: 85 MG/DL (ref 0–149)
TSH SERPL DL<=0.005 MIU/L-ACNC: 4.99 UIU/ML (ref 0.45–4.5)
WBC # BLD AUTO: 5.7 X10E3/UL (ref 3.4–10.8)

## 2020-08-05 ENCOUNTER — OFFICE VISIT (OUTPATIENT)
Dept: INTERNAL MEDICINE CLINIC | Facility: CLINIC | Age: 85
End: 2020-08-05
Payer: COMMERCIAL

## 2020-08-05 VITALS
WEIGHT: 200 LBS | RESPIRATION RATE: 13 BRPM | HEIGHT: 66 IN | TEMPERATURE: 98.5 F | DIASTOLIC BLOOD PRESSURE: 70 MMHG | BODY MASS INDEX: 32.14 KG/M2 | SYSTOLIC BLOOD PRESSURE: 140 MMHG | HEART RATE: 80 BPM

## 2020-08-05 DIAGNOSIS — E03.8 SUBCLINICAL HYPOTHYROIDISM: ICD-10-CM

## 2020-08-05 DIAGNOSIS — E55.9 VITAMIN D DEFICIENCY: ICD-10-CM

## 2020-08-05 DIAGNOSIS — E11.51 TYPE II DIABETES MELLITUS WITH PERIPHERAL CIRCULATORY DISORDER (HCC): Primary | ICD-10-CM

## 2020-08-05 DIAGNOSIS — I10 ESSENTIAL HYPERTENSION: ICD-10-CM

## 2020-08-05 DIAGNOSIS — R09.89 DECREASED PEDAL PULSES: ICD-10-CM

## 2020-08-05 DIAGNOSIS — E78.00 PURE HYPERCHOLESTEROLEMIA: ICD-10-CM

## 2020-08-05 PROCEDURE — 3078F DIAST BP <80 MM HG: CPT | Performed by: INTERNAL MEDICINE

## 2020-08-05 PROCEDURE — 1036F TOBACCO NON-USER: CPT | Performed by: INTERNAL MEDICINE

## 2020-08-05 PROCEDURE — 3008F BODY MASS INDEX DOCD: CPT | Performed by: INTERNAL MEDICINE

## 2020-08-05 PROCEDURE — 3725F SCREEN DEPRESSION PERFORMED: CPT | Performed by: INTERNAL MEDICINE

## 2020-08-05 PROCEDURE — 1160F RVW MEDS BY RX/DR IN RCRD: CPT | Performed by: INTERNAL MEDICINE

## 2020-08-05 PROCEDURE — 4040F PNEUMOC VAC/ADMIN/RCVD: CPT | Performed by: INTERNAL MEDICINE

## 2020-08-05 PROCEDURE — 99214 OFFICE O/P EST MOD 30 MIN: CPT | Performed by: INTERNAL MEDICINE

## 2020-08-05 PROCEDURE — 1101F PT FALLS ASSESS-DOCD LE1/YR: CPT | Performed by: INTERNAL MEDICINE

## 2020-08-05 PROCEDURE — 3077F SYST BP >= 140 MM HG: CPT | Performed by: INTERNAL MEDICINE

## 2020-08-05 PROCEDURE — 3288F FALL RISK ASSESSMENT DOCD: CPT | Performed by: INTERNAL MEDICINE

## 2020-08-05 PROCEDURE — 2022F DILAT RTA XM EVC RTNOPTHY: CPT | Performed by: INTERNAL MEDICINE

## 2020-08-05 NOTE — PROGRESS NOTES
Assessment/Plan: excellent diabetic control will see her back in 3 months decreasing pedal pulses will check an arterial Doppler    Problem 1  Diabetes excellent control with a hemoglobin A1c of 6 no polyuria polydipsia is down to prednisone 1 mg per day he is taking metformin tolerating it well    Problem 2  Blood pressure very well control on multiple medications denies any chest pain palpitation shortness of breath or headache he is on the following medications  Indapamide 2 5 mg   Amlodipine 2 5   Ramipril 10 mg    bisoprolol 5 mg    Problem 3  Hyperlipidemia well control on Lipitor 40 mg per day LDL is at goal     Problem 4  Rash in the dorsum of the left foot has resolved which is good news    Problem 5  Peripheral vascular disease with diabetes he had decreasing pedal pulses when I did the diabetic foot exam will send in for arterial Doppler he is asymptomatic no evidence of gangrene    He has none of the corona virus symptomatology he feels well he is watching his social distance he is wearing a mask will see him back in 3 months    No problem-specific Assessment & Plan notes found for this encounter  Diagnoses and all orders for this visit:    Type II diabetes mellitus with peripheral circulatory disorder (HCC)  -     CBC and differential; Future  -     Basic metabolic panel; Future  -     Hemoglobin A1C; Future  -     Microalbumin / creatinine urine ratio  -     TSH, 3rd generation with Free T4 reflex; Future  -     VAS lower limb arterial duplex, complete bilateral; Future    Essential hypertension  -     CBC and differential; Future  -     Basic metabolic panel; Future  -     Hemoglobin A1C; Future  -     Microalbumin / creatinine urine ratio  -     TSH, 3rd generation with Free T4 reflex;  Future  -     VAS lower limb arterial duplex, complete bilateral; Future    Subclinical hypothyroidism    Vitamin D deficiency    Pure hypercholesterolemia  -     CBC and differential; Future  -     Basic metabolic panel; Future  -     Hemoglobin A1C; Future  -     Microalbumin / creatinine urine ratio  -     TSH, 3rd generation with Free T4 reflex; Future  -     VAS lower limb arterial duplex, complete bilateral; Future    Decreased pedal pulses  -     CBC and differential; Future  -     Basic metabolic panel; Future  -     Hemoglobin A1C; Future  -     Microalbumin / creatinine urine ratio  -     TSH, 3rd generation with Free T4 reflex; Future  -     VAS lower limb arterial duplex, complete bilateral; Future          Subjective:      Patient ID: Lauren Richey is a 81 y o  male  Chief Complaint   Patient presents with    Follow-up     Needs colonoscopy & foot exam            Current Outpatient Medications:     acetaminophen (TYLENOL) 325 mg tablet, Take 2 tablets (650 mg total) by mouth every 6 (six) hours as needed for mild pain, Disp: , Rfl:     amLODIPine (NORVASC) 2 5 mg tablet, TAKE 2 TABLETS (5 MG TOTAL) BY MOUTH DAILY, Disp: 90 tablet, Rfl: 1    Ascorbic Acid (VITAMIN C PO), Take 1,000 mg by mouth daily, Disp: , Rfl:     aspirin 81 MG tablet, Take 1 tablet by mouth daily, Disp: , Rfl:     atorvastatin (LIPITOR) 40 mg tablet, take 1 tablet (40MG TOTAL by mouth once daily, Disp: 90 tablet, Rfl: 0    bisoprolol (ZEBETA) 5 mg tablet, take 1 tablet (5 MG TOTAL) by mouth once daily, Disp: 90 tablet, Rfl: 0    Blood Pressure KIT, by Does not apply route 2 (two) times a week, Disp: 1 each, Rfl: 0    Cholecalciferol (VITAMIN D3) 25 MCG (1000 UT) CAPS, TAKE 1 CAPSULE ON MON WED  AND FRI , Disp: 30 capsule, Rfl: 2    clotrimazole-betamethasone (LOTRISONE) 1-0 05 % cream, Apply topically 2 (two) times a day, Disp: 30 g, Rfl: 1    FEROSUL 325 (65 Fe) MG tablet, TAKE 1 TABLET  ON MON WED   AND FRI , Disp: 12 tablet, Rfl: 1    folic acid (FOLVITE) 1 mg tablet, take 1 tablet by mouth once daily, Disp: 90 tablet, Rfl: 1    glucose blood (TRUETRACK TEST) test strip, by In Vitro route 2 (two) times a day, Disp: , Rfl:     indapamide (LOZOL) 2 5 mg tablet, TAKE 1 TABLET (2 5 MG TOTAL) BY MOUTH DAILY, Disp: 90 tablet, Rfl: 0    ketoconazole (NIZORAL) 2 % cream, Apply topically daily, Disp: 15 g, Rfl: 0    Lysine HCl 1000 MG TABS, Take 1,000 mg by mouth daily , Disp: , Rfl:     magnesium chloride-calcium (MAG-SR PLUS CALCIUM)  mg, Take 2 tablets by mouth daily, Disp: , Rfl:     multivitamin-minerals (CENTRUM ADULTS) tablet, Take 1 tablet by mouth daily, Disp: , Rfl:     omeprazole (PriLOSEC) 40 MG capsule, take 1 capsule by mouth once daily, Disp: 90 capsule, Rfl: 0    predniSONE 1 MG TBEC, Take 3 mg by mouth daily, Disp: , Rfl:     ramipril (ALTACE) 10 MG capsule, TAKE 1 CAPSULE (10 MG TOTAL) BY MOUTH DAILY, Disp: 90 capsule, Rfl: 1    tamsulosin (FLOMAX) 0 4 mg, take 1 capsule by mouth once daily, Disp: 30 capsule, Rfl: 4    HPI    The following portions of the patient's history were reviewed and updated as appropriate: allergies, current medications, past family history, past medical history, past social history, past surgical history and problem list     Review of Systems   Constitutional: Negative  Negative for activity change, appetite change, fatigue, fever and unexpected weight change  HENT: Negative for congestion, ear pain, hearing loss, mouth sores, postnasal drip, rhinorrhea, sore throat, trouble swallowing and voice change  Eyes: Negative for pain, redness and visual disturbance  Respiratory: Negative for cough, chest tightness, shortness of breath and wheezing  Cardiovascular: Negative for chest pain, palpitations and leg swelling  Gastrointestinal: Negative for abdominal distention, abdominal pain, blood in stool, constipation, diarrhea and nausea  Endocrine: Negative for cold intolerance, heat intolerance, polydipsia, polyphagia and polyuria  Genitourinary: Negative for difficulty urinating, dysuria, flank pain, frequency, hematuria and urgency     Musculoskeletal: Negative for arthralgias, back pain, gait problem, joint swelling and myalgias  Skin: Negative for color change and pallor  Neurological: Negative for dizziness, tremors, seizures, syncope, weakness, numbness and headaches  Hematological: Negative for adenopathy  Does not bruise/bleed easily  Psychiatric/Behavioral: Negative  Negative for sleep disturbance  The patient is not nervous/anxious  Objective:    Patient's shoes and socks removed  Right Foot/Ankle   Right Foot Inspection  Skin Exam: skin normal skin not intact, no dry skin, no warmth, no callus, no erythema, no maceration, no abnormal color, no pre-ulcer, no ulcer and no callus                          Toe Exam: ROM and strength within normal limitsno swelling, no tenderness, erythema and  no right toe deformity  Sensory   Vibration: diminished and intact  Proprioception: intact   Monofilament testing: intact  Vascular    The right DP pulse is 1+  The right PT pulse is 1+  Left Foot/Ankle  Left Foot Inspection  Skin Exam: skin normalskin not intact, no dry skin, no warmth, no erythema, no maceration, normal color, no pre-ulcer, no ulcer and no callus                         Toe Exam: ROM and strength within normal limitsno swelling, no tenderness, no erythema and no left toe deformity                   Sensory   Vibration: diminished  Proprioception: intact  Monofilament: intact  Vascular    The left DP pulse is 0  The left PT pulse is 0  Assign Risk Category:  No deformity present; No loss of protective sensation; No weak pulses       Risk: 0        /70 (BP Location: Left arm, Patient Position: Sitting)   Pulse 80   Temp 98 5 °F (36 9 °C)   Resp 13   Ht 5' 6" (1 676 m)   Wt 90 7 kg (200 lb)   BMI 32 28 kg/m²      Physical Exam   Constitutional: He is oriented to person, place, and time  He appears well-developed  HENT:   Head: Normocephalic     Right Ear: External ear normal    Left Ear: External ear normal    Nose: Nose normal  Mouth/Throat: No oropharyngeal exudate  Eyes: Pupils are equal, round, and reactive to light  Conjunctivae are normal    Neck: Normal range of motion  Neck supple  No thyromegaly present  Cardiovascular: Normal rate, regular rhythm and normal heart sounds  Exam reveals no gallop and no friction rub  Pulses are no weak pulses  No murmur heard  Pulses:       Dorsalis pedis pulses are 1+ on the right side and 0 on the left side  Posterior tibial pulses are 1+ on the right side and 0 on the left side  S1-S2 regular rhythm  Blood pressure 140/70 no change with standing  Extremities no edema   Pulmonary/Chest: Effort normal and breath sounds normal  No respiratory distress  He has no wheezes  He has no rales  Abdominal: Soft  Bowel sounds are normal  He exhibits no distension and no mass  There is no abdominal tenderness  There is no rebound and no guarding  Musculoskeletal: Normal range of motion  Feet:    Feet:   Right Foot:   Skin Integrity: Negative for ulcer, skin breakdown, erythema, warmth, callus or dry skin  Left Foot:   Skin Integrity: Negative for ulcer, skin breakdown, erythema, warmth, callus or dry skin  Lymphadenopathy:     He has no cervical adenopathy  Neurological: He is alert and oriented to person, place, and time  Skin: Skin is warm and dry  Left foot the rash resolved good news   Psychiatric: His behavior is normal  Judgment normal    Nursing note and vitals reviewed

## 2020-08-05 NOTE — PATIENT INSTRUCTIONS
Blood pressure is well control   Diabetes is well control  Because of decrease in pulses in the left leg will repeat the arterial Doppler compare it to April last year   I will see her back in 3-4 months

## 2020-08-15 DIAGNOSIS — E78.00 PURE HYPERCHOLESTEROLEMIA: ICD-10-CM

## 2020-08-15 DIAGNOSIS — K21.9 GASTROESOPHAGEAL REFLUX DISEASE WITHOUT ESOPHAGITIS: ICD-10-CM

## 2020-08-15 DIAGNOSIS — E61.1 LOW IRON: ICD-10-CM

## 2020-08-15 DIAGNOSIS — I10 ESSENTIAL HYPERTENSION: ICD-10-CM

## 2020-08-15 RX ORDER — FOLIC ACID 1 MG/1
TABLET ORAL
Qty: 90 TABLET | Refills: 1 | Status: SHIPPED | OUTPATIENT
Start: 2020-08-15 | End: 2021-02-23

## 2020-08-15 RX ORDER — OMEPRAZOLE 40 MG/1
CAPSULE, DELAYED RELEASE ORAL
Qty: 90 CAPSULE | Refills: 0 | Status: SHIPPED | OUTPATIENT
Start: 2020-08-15 | End: 2020-11-23

## 2020-08-15 RX ORDER — BISOPROLOL FUMARATE 5 MG/1
TABLET ORAL
Qty: 90 TABLET | Refills: 0 | Status: SHIPPED | OUTPATIENT
Start: 2020-08-15 | End: 2020-12-20

## 2020-08-15 RX ORDER — FERROUS SULFATE 325(65) MG
TABLET ORAL
Qty: 12 TABLET | Refills: 1 | Status: SHIPPED | OUTPATIENT
Start: 2020-08-15 | End: 2020-11-23

## 2020-08-15 RX ORDER — INDAPAMIDE 2.5 MG/1
2.5 TABLET, FILM COATED ORAL DAILY
Qty: 90 TABLET | Refills: 0 | Status: SHIPPED | OUTPATIENT
Start: 2020-08-15 | End: 2020-11-23

## 2020-08-15 RX ORDER — ATORVASTATIN CALCIUM 40 MG/1
TABLET, FILM COATED ORAL
Qty: 90 TABLET | Refills: 0 | Status: SHIPPED | OUTPATIENT
Start: 2020-08-15 | End: 2020-11-23

## 2020-09-14 DIAGNOSIS — L30.9 ECZEMA, UNSPECIFIED TYPE: ICD-10-CM

## 2020-09-14 DIAGNOSIS — I10 ESSENTIAL HYPERTENSION: ICD-10-CM

## 2020-09-14 DIAGNOSIS — N40.1 BENIGN PROSTATIC HYPERPLASIA WITH URINARY FREQUENCY: ICD-10-CM

## 2020-09-14 DIAGNOSIS — R35.0 BENIGN PROSTATIC HYPERPLASIA WITH URINARY FREQUENCY: ICD-10-CM

## 2020-09-14 RX ORDER — RAMIPRIL 10 MG/1
10 CAPSULE ORAL DAILY
Qty: 90 CAPSULE | Refills: 1 | Status: SHIPPED | OUTPATIENT
Start: 2020-09-14 | End: 2021-02-23

## 2020-09-14 RX ORDER — CLOTRIMAZOLE AND BETAMETHASONE DIPROPIONATE 10; .64 MG/G; MG/G
CREAM TOPICAL
Qty: 30 G | Refills: 1 | Status: SHIPPED | OUTPATIENT
Start: 2020-09-14

## 2020-09-14 RX ORDER — TAMSULOSIN HYDROCHLORIDE 0.4 MG/1
CAPSULE ORAL
Qty: 30 CAPSULE | Refills: 4 | Status: SHIPPED | OUTPATIENT
Start: 2020-09-14 | End: 2021-02-23

## 2020-09-28 ENCOUNTER — HOSPITAL ENCOUNTER (OUTPATIENT)
Dept: NON INVASIVE DIAGNOSTICS | Facility: CLINIC | Age: 85
Discharge: HOME | End: 2020-09-28
Payer: COMMERCIAL

## 2020-09-28 DIAGNOSIS — R09.89 DECREASED PEDAL PULSES: ICD-10-CM

## 2020-09-28 DIAGNOSIS — E11.51 TYPE II DIABETES MELLITUS WITH PERIPHERAL CIRCULATORY DISORDER (HCC): ICD-10-CM

## 2020-09-28 DIAGNOSIS — E78.00 PURE HYPERCHOLESTEROLEMIA: ICD-10-CM

## 2020-09-28 DIAGNOSIS — I10 ESSENTIAL HYPERTENSION: ICD-10-CM

## 2020-09-28 PROCEDURE — 93922 UPR/L XTREMITY ART 2 LEVELS: CPT | Performed by: SURGERY

## 2020-09-28 PROCEDURE — 93923 UPR/LXTR ART STDY 3+ LVLS: CPT

## 2020-09-28 PROCEDURE — 93925 LOWER EXTREMITY STUDY: CPT | Performed by: SURGERY

## 2020-09-28 PROCEDURE — 93925 LOWER EXTREMITY STUDY: CPT

## 2020-09-29 ENCOUNTER — TELEPHONE (OUTPATIENT)
Dept: INTERNAL MEDICINE CLINIC | Facility: CLINIC | Age: 85
End: 2020-09-29

## 2020-09-29 NOTE — TELEPHONE ENCOUNTER
Spoke to patient and reviewed the results      ----- Message from Tonny Portillo MD sent at 9/28/2020  5:41 PM EDT -----  Please call the patient regarding his abnormal result  art doopler  PVD  No change 2019 !!!

## 2020-10-31 LAB
ALBUMIN/CREAT UR: 815 MG/G CREAT (ref 0–29)
BASOPHILS # BLD AUTO: 0.1 X10E3/UL (ref 0–0.2)
BASOPHILS NFR BLD AUTO: 1 %
BUN SERPL-MCNC: 23 MG/DL (ref 8–27)
BUN/CREAT SERPL: 16 (ref 10–24)
CALCIUM SERPL-MCNC: 9.4 MG/DL (ref 8.6–10.2)
CHLORIDE SERPL-SCNC: 102 MMOL/L (ref 96–106)
CO2 SERPL-SCNC: 25 MMOL/L (ref 20–29)
CREAT SERPL-MCNC: 1.43 MG/DL (ref 0.76–1.27)
CREAT UR-MCNC: 111.1 MG/DL
EOSINOPHIL # BLD AUTO: 0.2 X10E3/UL (ref 0–0.4)
EOSINOPHIL NFR BLD AUTO: 4 %
ERYTHROCYTE [DISTWIDTH] IN BLOOD BY AUTOMATED COUNT: 14.3 % (ref 11.6–15.4)
GLUCOSE SERPL-MCNC: 105 MG/DL (ref 65–99)
HBA1C MFR BLD: 6.2 % (ref 4.8–5.6)
HCT VFR BLD AUTO: 37.7 % (ref 37.5–51)
HGB BLD-MCNC: 11.8 G/DL (ref 13–17.7)
IMM GRANULOCYTES # BLD: 0 X10E3/UL (ref 0–0.1)
IMM GRANULOCYTES NFR BLD: 0 %
LYMPHOCYTES # BLD AUTO: 1.4 X10E3/UL (ref 0.7–3.1)
LYMPHOCYTES NFR BLD AUTO: 26 %
MCH RBC QN AUTO: 28.2 PG (ref 26.6–33)
MCHC RBC AUTO-ENTMCNC: 31.3 G/DL (ref 31.5–35.7)
MCV RBC AUTO: 90 FL (ref 79–97)
MICROALBUMIN UR-MCNC: 905 UG/ML
MONOCYTES # BLD AUTO: 0.8 X10E3/UL (ref 0.1–0.9)
MONOCYTES NFR BLD AUTO: 15 %
NEUTROPHILS # BLD AUTO: 2.8 X10E3/UL (ref 1.4–7)
NEUTROPHILS NFR BLD AUTO: 54 %
PLATELET # BLD AUTO: 183 X10E3/UL (ref 150–450)
POTASSIUM SERPL-SCNC: 4.8 MMOL/L (ref 3.5–5.2)
RBC # BLD AUTO: 4.18 X10E6/UL (ref 4.14–5.8)
SL AMB EGFR AFRICAN AMERICAN: 52 ML/MIN/1.73
SL AMB EGFR NON AFRICAN AMERICAN: 45 ML/MIN/1.73
SL AMB T4, FREE (DIRECT): 1.18 NG/DL (ref 0.82–1.77)
SODIUM SERPL-SCNC: 140 MMOL/L (ref 134–144)
TSH SERPL DL<=0.005 MIU/L-ACNC: 5.04 UIU/ML (ref 0.45–4.5)
WBC # BLD AUTO: 5.2 X10E3/UL (ref 3.4–10.8)

## 2020-11-04 ENCOUNTER — OFFICE VISIT (OUTPATIENT)
Dept: INTERNAL MEDICINE CLINIC | Facility: CLINIC | Age: 85
End: 2020-11-04
Payer: COMMERCIAL

## 2020-11-04 VITALS
RESPIRATION RATE: 12 BRPM | DIASTOLIC BLOOD PRESSURE: 76 MMHG | SYSTOLIC BLOOD PRESSURE: 144 MMHG | WEIGHT: 203 LBS | HEIGHT: 66 IN | TEMPERATURE: 97.9 F | HEART RATE: 80 BPM | BODY MASS INDEX: 32.62 KG/M2

## 2020-11-04 DIAGNOSIS — I10 ESSENTIAL HYPERTENSION: ICD-10-CM

## 2020-11-04 DIAGNOSIS — E11.51 TYPE II DIABETES MELLITUS WITH PERIPHERAL CIRCULATORY DISORDER (HCC): Primary | ICD-10-CM

## 2020-11-04 DIAGNOSIS — I71.4 AAA (ABDOMINAL AORTIC ANEURYSM) WITHOUT RUPTURE (HCC): Chronic | ICD-10-CM

## 2020-11-04 DIAGNOSIS — Z23 ENCOUNTER FOR IMMUNIZATION: ICD-10-CM

## 2020-11-04 DIAGNOSIS — M35.3 POLYMYALGIA RHEUMATICA (HCC): ICD-10-CM

## 2020-11-04 DIAGNOSIS — I73.9 PERIPHERAL VASCULAR DISEASE (HCC): ICD-10-CM

## 2020-11-04 DIAGNOSIS — E78.00 PURE HYPERCHOLESTEROLEMIA: ICD-10-CM

## 2020-11-04 PROCEDURE — 1160F RVW MEDS BY RX/DR IN RCRD: CPT | Performed by: INTERNAL MEDICINE

## 2020-11-04 PROCEDURE — 1036F TOBACCO NON-USER: CPT | Performed by: INTERNAL MEDICINE

## 2020-11-04 PROCEDURE — 90662 IIV NO PRSV INCREASED AG IM: CPT | Performed by: INTERNAL MEDICINE

## 2020-11-04 PROCEDURE — G0008 ADMIN INFLUENZA VIRUS VAC: HCPCS | Performed by: INTERNAL MEDICINE

## 2020-11-04 PROCEDURE — 99214 OFFICE O/P EST MOD 30 MIN: CPT | Performed by: INTERNAL MEDICINE

## 2020-11-04 PROCEDURE — 3077F SYST BP >= 140 MM HG: CPT | Performed by: INTERNAL MEDICINE

## 2020-11-04 PROCEDURE — 3078F DIAST BP <80 MM HG: CPT | Performed by: INTERNAL MEDICINE

## 2020-11-04 RX ORDER — AMLODIPINE BESYLATE 5 MG/1
5 TABLET ORAL DAILY
Qty: 30 TABLET | Refills: 5 | Status: SHIPPED | OUTPATIENT
Start: 2020-11-04 | End: 2021-06-02

## 2020-11-23 DIAGNOSIS — K21.9 GASTROESOPHAGEAL REFLUX DISEASE WITHOUT ESOPHAGITIS: ICD-10-CM

## 2020-11-23 DIAGNOSIS — E78.00 PURE HYPERCHOLESTEROLEMIA: ICD-10-CM

## 2020-11-23 DIAGNOSIS — E61.1 LOW IRON: ICD-10-CM

## 2020-11-23 DIAGNOSIS — I10 ESSENTIAL HYPERTENSION: ICD-10-CM

## 2020-11-23 RX ORDER — FERROUS SULFATE 325(65) MG
TABLET ORAL
Qty: 12 TABLET | Refills: 1 | Status: SHIPPED | OUTPATIENT
Start: 2020-11-23 | End: 2021-01-27

## 2020-11-23 RX ORDER — OMEPRAZOLE 40 MG/1
CAPSULE, DELAYED RELEASE ORAL
Qty: 90 CAPSULE | Refills: 0 | Status: SHIPPED | OUTPATIENT
Start: 2020-11-23 | End: 2021-02-23

## 2020-11-23 RX ORDER — ATORVASTATIN CALCIUM 40 MG/1
TABLET, FILM COATED ORAL
Qty: 90 TABLET | Refills: 0 | Status: SHIPPED | OUTPATIENT
Start: 2020-11-23 | End: 2021-02-23

## 2020-11-23 RX ORDER — INDAPAMIDE 2.5 MG/1
2.5 TABLET, FILM COATED ORAL DAILY
Qty: 90 TABLET | Refills: 0 | Status: SHIPPED | OUTPATIENT
Start: 2020-11-23 | End: 2021-02-23

## 2020-12-20 DIAGNOSIS — I10 ESSENTIAL HYPERTENSION: ICD-10-CM

## 2020-12-20 DIAGNOSIS — E55.9 VITAMIN D DEFICIENCY: ICD-10-CM

## 2020-12-20 RX ORDER — BISOPROLOL FUMARATE 5 MG/1
TABLET ORAL
Qty: 90 TABLET | Refills: 0 | Status: SHIPPED | OUTPATIENT
Start: 2020-12-20 | End: 2021-02-23

## 2020-12-20 RX ORDER — BIOTIN 1 MG
TABLET ORAL
Qty: 30 CAPSULE | Refills: 2 | Status: SHIPPED | OUTPATIENT
Start: 2020-12-20 | End: 2021-10-25

## 2021-01-20 DIAGNOSIS — Z23 ENCOUNTER FOR IMMUNIZATION: ICD-10-CM

## 2021-01-27 DIAGNOSIS — E61.1 LOW IRON: ICD-10-CM

## 2021-01-27 RX ORDER — FERROUS SULFATE 325(65) MG
TABLET ORAL
Qty: 12 TABLET | Refills: 1 | Status: SHIPPED | OUTPATIENT
Start: 2021-01-27 | End: 2021-06-06

## 2021-01-28 LAB
25(OH)D3+25(OH)D2 SERPL-MCNC: 41.6 NG/ML (ref 30–100)
ALBUMIN SERPL-MCNC: 4 G/DL (ref 3.6–4.6)
ALBUMIN/CREAT UR: 597 MG/G CREAT (ref 0–29)
ALBUMIN/GLOB SERPL: 1.5 {RATIO} (ref 1.2–2.2)
ALP SERPL-CCNC: 73 IU/L (ref 39–117)
ALT SERPL-CCNC: 32 IU/L (ref 0–44)
APPEARANCE UR: CLEAR
AST SERPL-CCNC: 26 IU/L (ref 0–40)
BACTERIA URNS QL MICRO: NORMAL
BASOPHILS # BLD AUTO: 0.1 X10E3/UL (ref 0–0.2)
BASOPHILS NFR BLD AUTO: 2 %
BILIRUB SERPL-MCNC: 0.4 MG/DL (ref 0–1.2)
BILIRUB UR QL STRIP: NEGATIVE
BUN SERPL-MCNC: 16 MG/DL (ref 8–27)
BUN/CREAT SERPL: 14 (ref 10–24)
CALCIUM SERPL-MCNC: 9 MG/DL (ref 8.6–10.2)
CHLORIDE SERPL-SCNC: 102 MMOL/L (ref 96–106)
CHOLEST SERPL-MCNC: 153 MG/DL (ref 100–199)
CO2 SERPL-SCNC: 26 MMOL/L (ref 20–29)
COLOR UR: YELLOW
CREAT SERPL-MCNC: 1.18 MG/DL (ref 0.76–1.27)
CREAT UR-MCNC: 76.7 MG/DL
CRP SERPL-MCNC: <1 MG/L (ref 0–10)
EOSINOPHIL # BLD AUTO: 0.3 X10E3/UL (ref 0–0.4)
EOSINOPHIL NFR BLD AUTO: 5 %
EPI CELLS #/AREA URNS HPF: NORMAL /HPF (ref 0–10)
ERYTHROCYTE [DISTWIDTH] IN BLOOD BY AUTOMATED COUNT: 14.4 % (ref 11.6–15.4)
ERYTHROCYTE [SEDIMENTATION RATE] IN BLOOD BY WESTERGREN METHOD: 19 MM/HR (ref 0–30)
GLOBULIN SER-MCNC: 2.7 G/DL (ref 1.5–4.5)
GLUCOSE SERPL-MCNC: 128 MG/DL (ref 65–99)
GLUCOSE UR QL: NEGATIVE
HBA1C MFR BLD: 6.6 % (ref 4.8–5.6)
HCT VFR BLD AUTO: 37 % (ref 37.5–51)
HDLC SERPL-MCNC: 48 MG/DL
HGB BLD-MCNC: 12.6 G/DL (ref 13–17.7)
HGB UR QL STRIP: NEGATIVE
IMM GRANULOCYTES # BLD: 0 X10E3/UL (ref 0–0.1)
IMM GRANULOCYTES NFR BLD: 1 %
KETONES UR QL STRIP: NEGATIVE
LDLC SERPL CALC-MCNC: 87 MG/DL (ref 0–99)
LEUKOCYTE ESTERASE UR QL STRIP: NEGATIVE
LYMPHOCYTES # BLD AUTO: 1.6 X10E3/UL (ref 0.7–3.1)
LYMPHOCYTES NFR BLD AUTO: 29 %
MAGNESIUM SERPL-MCNC: 1.5 MG/DL (ref 1.6–2.3)
MCH RBC QN AUTO: 29.9 PG (ref 26.6–33)
MCHC RBC AUTO-ENTMCNC: 34.1 G/DL (ref 31.5–35.7)
MCV RBC AUTO: 88 FL (ref 79–97)
MICRO URNS: ABNORMAL
MICROALBUMIN UR-MCNC: 457.7 UG/ML
MONOCYTES # BLD AUTO: 0.6 X10E3/UL (ref 0.1–0.9)
MONOCYTES NFR BLD AUTO: 11 %
MUCOUS THREADS URNS QL MICRO: PRESENT
NEUTROPHILS # BLD AUTO: 2.8 X10E3/UL (ref 1.4–7)
NEUTROPHILS NFR BLD AUTO: 52 %
NITRITE UR QL STRIP: NEGATIVE
PH UR STRIP: 6 [PH] (ref 5–7.5)
PLATELET # BLD AUTO: 178 X10E3/UL (ref 150–450)
POTASSIUM SERPL-SCNC: 4.6 MMOL/L (ref 3.5–5.2)
PROT SERPL-MCNC: 6.7 G/DL (ref 6–8.5)
PROT UR QL STRIP: ABNORMAL
RBC # BLD AUTO: 4.21 X10E6/UL (ref 4.14–5.8)
RBC #/AREA URNS HPF: NORMAL /HPF (ref 0–2)
SL AMB EGFR AFRICAN AMERICAN: 65 ML/MIN/1.73
SL AMB EGFR NON AFRICAN AMERICAN: 56 ML/MIN/1.73
SL AMB T4, FREE (DIRECT): 1.19 NG/DL (ref 0.82–1.77)
SL AMB URINALYSIS REFLEX: ABNORMAL
SL AMB VLDL CHOLESTEROL CALC: 18 MG/DL (ref 5–40)
SODIUM SERPL-SCNC: 141 MMOL/L (ref 134–144)
SP GR UR: 1.02 (ref 1–1.03)
TRIGL SERPL-MCNC: 98 MG/DL (ref 0–149)
TSH SERPL DL<=0.005 MIU/L-ACNC: 4.86 UIU/ML (ref 0.45–4.5)
UROBILINOGEN UR STRIP-ACNC: 0.2 MG/DL (ref 0.2–1)
WBC # BLD AUTO: 5.4 X10E3/UL (ref 3.4–10.8)
WBC #/AREA URNS HPF: NORMAL /HPF (ref 0–5)

## 2021-02-03 ENCOUNTER — TELEMEDICINE (OUTPATIENT)
Dept: INTERNAL MEDICINE CLINIC | Facility: CLINIC | Age: 86
End: 2021-02-03
Payer: COMMERCIAL

## 2021-02-03 DIAGNOSIS — M81.0 OSTEOPOROSIS, UNSPECIFIED OSTEOPOROSIS TYPE, UNSPECIFIED PATHOLOGICAL FRACTURE PRESENCE: ICD-10-CM

## 2021-02-03 DIAGNOSIS — I10 ESSENTIAL HYPERTENSION: ICD-10-CM

## 2021-02-03 DIAGNOSIS — M35.3 POLYMYALGIA RHEUMATICA (HCC): ICD-10-CM

## 2021-02-03 DIAGNOSIS — E78.00 PURE HYPERCHOLESTEROLEMIA: ICD-10-CM

## 2021-02-03 DIAGNOSIS — E11.51 TYPE II DIABETES MELLITUS WITH PERIPHERAL CIRCULATORY DISORDER (HCC): ICD-10-CM

## 2021-02-03 DIAGNOSIS — E03.8 SUBCLINICAL HYPOTHYROIDISM: ICD-10-CM

## 2021-02-03 DIAGNOSIS — Z00.00 MEDICARE ANNUAL WELLNESS VISIT, SUBSEQUENT: Primary | ICD-10-CM

## 2021-02-03 DIAGNOSIS — E55.9 VITAMIN D DEFICIENCY: ICD-10-CM

## 2021-02-03 PROCEDURE — 3725F SCREEN DEPRESSION PERFORMED: CPT | Performed by: INTERNAL MEDICINE

## 2021-02-03 PROCEDURE — G0439 PPPS, SUBSEQ VISIT: HCPCS | Performed by: INTERNAL MEDICINE

## 2021-02-03 PROCEDURE — 1125F AMNT PAIN NOTED PAIN PRSNT: CPT | Performed by: INTERNAL MEDICINE

## 2021-02-03 PROCEDURE — 1170F FXNL STATUS ASSESSED: CPT | Performed by: INTERNAL MEDICINE

## 2021-02-03 PROCEDURE — 3288F FALL RISK ASSESSMENT DOCD: CPT | Performed by: INTERNAL MEDICINE

## 2021-02-03 PROCEDURE — 99213 OFFICE O/P EST LOW 20 MIN: CPT | Performed by: INTERNAL MEDICINE

## 2021-02-03 NOTE — PROGRESS NOTES
Virtual Regular Visit      Assessment/Plan:         problem 1  Coronary artery disease stable no chest palpitation shortness of breath he looks well on video  He is taking his medications     Problem 2  Abdominal aortic aneurysm follows up with vascular no abdominal pain or back pain also has peripheral vascular disease  Problem 3  High blood pressure I told him to check his blood pressure and let us know  Will continue the same medication he denies any chest pain palpitation shortness of breath or headaches     Problem 4  Hyperlipidemia continue your statin no change in medication or plan     Problem 5  Polymyalgia rheumatica CRP is less than 1 which is excellent he only takes prednisone 1 mg per day     Lab review in detail which were stable        I told him the importance of getting the COVID-19 vaccine will see if we can schedule him for that through [facility]    Results for orders placed or performed in visit on 01/27/21   CBC and differential   Result Value Ref Range    White Blood Cell Count 5 4 3 4 - 10 8 x10E3/uL    Red Blood Cell Count 4 21 4 14 - 5 80 x10E6/uL    Hemoglobin 12 6 (L) 13 0 - 17 7 g/dL    HCT 37 0 (L) 37 5 - 51 0 %    MCV 88 79 - 97 fL    MCH 29 9 26 6 - 33 0 pg    MCHC 34 1 31 5 - 35 7 g/dL    RDW 14 4 11 6 - 15 4 %    Platelet Count 734 872 - 450 x10E3/uL    Neutrophils 52 Not Estab  %    Lymphocytes 29 Not Estab  %    Monocytes 11 Not Estab  %    Eosinophils 5 Not Estab  %    Basophils PCT 2 Not Estab  %    Neutrophils (Absolute) 2 8 1 4 - 7 0 x10E3/uL    Lymphocytes (Absolute) 1 6 0 7 - 3 1 x10E3/uL    Monocytes (Absolute) 0 6 0 1 - 0 9 x10E3/uL    Eosinophils (Absolute) 0 3 0 0 - 0 4 x10E3/uL    Basophils ABS 0 1 0 0 - 0 2 x10E3/uL    Immature Granulocytes 1 Not Estab  %    Immature Granulocytes (Absolute) 0 0 0 0 - 0 1 x10E3/uL   Comprehensive metabolic panel   Result Value Ref Range    Glucose, Random 128 (H) 65 - 99 mg/dL    BUN 16 8 - 27 mg/dL    Creatinine 1 18 0 76 - 1 27 mg/dL    eGFR Non  56 (L) >59 mL/min/1 73    eGFR  65 >59 mL/min/1 73    SL AMB BUN/CREATININE RATIO 14 10 - 24    Sodium 141 134 - 144 mmol/L    Potassium 4 6 3 5 - 5 2 mmol/L    Chloride 102 96 - 106 mmol/L    CO2 26 20 - 29 mmol/L    CALCIUM 9 0 8 6 - 10 2 mg/dL    Protein, Total 6 7 6 0 - 8 5 g/dL    Albumin 4 0 3 6 - 4 6 g/dL    Globulin, Total 2 7 1 5 - 4 5 g/dL    Albumin/Globulin Ratio 1 5 1 2 - 2 2    TOTAL BILIRUBIN 0 4 0 0 - 1 2 mg/dL    Alk Phos Isoenzymes 73 39 - 117 IU/L    AST 26 0 - 40 IU/L    ALT 32 0 - 44 IU/L   UA/M w/rflx Culture, Comp   Result Value Ref Range    Specific Gravity 1 017 1 005 - 1 030    Ph 6 0 5 0 - 7 5    Color UA Yellow Yellow    Urine Appearance Clear Clear    Leukocyte Esterase Negative Negative    Protein 2+ (A) Negative/Trace    Glucose, 24 HR Urine Negative Negative    Ketone, Urine Negative Negative    Blood, Urine Negative Negative    Bilirubin, Urine Negative Negative    Urobilinogen Urine 0 2 0 2 - 1 0 mg/dL    SL AMB NITRITES URINE, QUAL  Negative Negative    Microscopic Examination See below:     Urinalysis Reflex Comment    Microscopic Examination   Result Value Ref Range    SL AMB WBC, URINE 0-5 0 - 5 /hpf    RBC, Urine None seen 0 - 2 /hpf    Epithelial Cells (non renal) 0-10 0 - 10 /hpf    MUCUS THREADS Present Not Estab  Bacteria, Urine None seen None seen/Few   Lipid Panel with Direct LDL reflex   Result Value Ref Range    Cholesterol, Total 153 100 - 199 mg/dL    Triglycerides 98 0 - 149 mg/dL    HDL 48 >39 mg/dL    VLDL Cholesterol Calculated 18 5 - 40 mg/dL    LDL Calculated 87 0 - 99 mg/dL   Microalbumin / creatinine urine ratio   Result Value Ref Range    Creatinine, Urine 76 7 Not Estab  mg/dL    Microalbum  ,U,Random 457 7 Not Estab  ug/mL    Microalb/Creat Ratio 597 (H) 0 - 29 mg/g creat   Hemoglobin A1c (w/out EAG)   Result Value Ref Range    Hemoglobin A1C 6 6 (H) 4 8 - 5 6 %   Vitamin D 25 hydroxy   Result Value Ref Range    25-HYDROXY VIT D 41 6 30 0 - 100 0 ng/mL   TSH, 3rd generation with Free T4 reflex   Result Value Ref Range    TSH 4 860 (H) 0 450 - 4 500 uIU/mL   T4, free   Result Value Ref Range    T4,Free (Direct) 1 19 0 82 - 1 77 ng/dL   Sedimentation rate, automated   Result Value Ref Range    Sedimentation Rate 19 0 - 30 mm/hr   Magnesium   Result Value Ref Range    Magnesium, Serum 1 5 (L) 1 6 - 2 3 mg/dL   C-reactive protein   Result Value Ref Range    C-Reactive Protein, Quant <1 0 - 10 mg/L       Problem List Items Addressed This Visit        Endocrine    Type II diabetes mellitus with peripheral circulatory disorder (HCC)    Subclinical hypothyroidism       Cardiovascular and Mediastinum    Essential hypertension       Other    Pure hypercholesterolemia    Vitamin D deficiency    Relevant Orders    DXA bone density spine hip and pelvis    Polymyalgia rheumatica (HCC)    Relevant Orders    DXA bone density spine hip and pelvis      Other Visit Diagnoses     Medicare annual wellness visit, subsequent    -  Primary    Osteoporosis, unspecified osteoporosis type, unspecified pathological fracture presence        Relevant Orders    DXA bone density spine hip and pelvis               Reason for visit is   Chief Complaint   Patient presents with    Medicare Wellness Visit    Virtual Awv    Virtual Regular Visit        Encounter provider Miriam Hoyt MD    Provider located at [place] [phone]      Recent Visits  No visits were found meeting these conditions  Showing recent visits within past 7 days and meeting all other requirements     Today's Visits  Date Type Provider Dept   02/03/21 Telemedicine Miriam Hoyt MD  Internal Med [place]   Showing today's visits and meeting all other requirements     Future Appointments  No visits were found meeting these conditions     Showing future appointments within next 150 days and meeting all other requirements        The patient was identified by name and date of birth  Kodi Arvizu was informed that this is a telemedicine visit and that the visit is being conducted through [facility] [place] and patient was informed that this is not a secure, HIPAA-compliant platform  He agrees to proceed     My office door was closed  No one else was in the room  He acknowledged consent and understanding of privacy and security of the video platform  The patient has agreed to participate and understands they can discontinue the visit at any time  Patient is aware this is a billable service  Subjective  Kodi Arvizu is a 81 y o  male          HPI     Past Medical History:   Diagnosis Date    Allergic rhinitis     resolved 12/15/2017    Anemia     resolved 12/15/2017    Arthritis     Cataract     resolved 12/15/2017    Coronary artery disease     Hearing problem     Heart attack ([address] )     Hiatal hernia     Hyperlipidemia     Hypertension     Impaired fasting glucose     resolved 12/15/2017    Numbness of leg     resolved 12/15/2017    Pneumonia 03/2019    Polyarthritis     resolved 12/15/2017    PONV (postoperative nausea and vomiting)     PVD (peripheral vascular disease) ([address] )        Past Surgical History:   Procedure Laterality Date    CARDIAC SURGERY      CATARACT EXTRACTION Bilateral     CORONARY ANGIOPLASTY WITH STENT PLACEMENT      IR EVAR      IR EVAR  7/1/2019    OTHER SURGICAL HISTORY      detatched bicep tendon    [place] EVASC RPR DPLMNT AORTO-AORTIC NDGFT N/A 7/1/2019    Procedure: REVISION EVAR WITH AORTIC EXTENSION CUFF X3 WITH 40N45mx GORE, RIGHT RENAL STENTING WITH 6X16mm iCASt;  Surgeon: Chase Briseno MD;  [facility];  Service: Vascular       Current Outpatient Medications   Medication Sig Dispense Refill    acetaminophen (TYLENOL) 325 mg tablet Take 2 tablets (650 mg total) by mouth every 6 (six) hours as needed for mild pain      amLODIPine (NORVASC) 5 mg tablet Take 1 tablet (5 mg total) by mouth daily 30 tablet 5    Ascorbic Acid (VITAMIN C PO) Take 1,000 mg by mouth daily      aspirin 81 MG tablet Take 1 tablet by mouth daily      atorvastatin (LIPITOR) 40 mg tablet take 1 tablet (40MG TOTAL by mouth once daily 90 tablet 0    bisoprolol (ZEBETA) 5 mg tablet take 1 tablet (5 MG TOTAL) by mouth once daily 90 tablet 0    Blood Pressure KIT by Does not apply route 2 (two) times a week 1 each 0    Cholecalciferol (Vitamin D3) 25 MCG (1000 UT) CAPS TAKE 1 CAPSULE ON MON WED  AND FRI  30 capsule 2    clotrimazole-betamethasone (LOTRISONE) 1-0 05 % cream apply topically to affected area twice a day 30 g 1    FeroSul 325 (65 Fe) MG tablet TAKE 1 TABLET  ON MON WED  AND FRI  12 tablet 1    folic acid (FOLVITE) 1 mg tablet take 1 tablet by mouth once daily 90 tablet 1    glucose blood (TRUETRACK TEST) test strip by In Vitro route 2 (two) times a day      indapamide (LOZOL) 2 5 mg tablet TAKE 1 TABLET (2 5 MG TOTAL) BY MOUTH DAILY 90 tablet 0    ketoconazole (NIZORAL) 2 % cream Apply topically daily 15 g 0    Lysine HCl 1000 MG TABS Take 1,000 mg by mouth daily       magnesium chloride-calcium (MAG-SR PLUS CALCIUM)  mg Take 2 tablets by mouth daily      multivitamin-minerals (CENTRUM ADULTS) tablet Take 1 tablet by mouth daily      omeprazole (PriLOSEC) 40 MG capsule take 1 capsule by mouth once daily 90 capsule 0    predniSONE 1 MG TBEC 1 mg daily      ramipril (ALTACE) 10 MG capsule TAKE 1 CAPSULE (10 MG TOTAL) BY MOUTH DAILY 90 capsule 1    tamsulosin (FLOMAX) 0 4 mg take 1 capsule by mouth once daily 30 capsule 4     No current facility-administered medications for this visit  Allergies   Allergen Reactions    Sulfamethoxazole-Trimethoprim Nausea Only       Review of Systems   Constitutional: Negative  Negative for activity change, appetite change, fatigue, fever and unexpected weight change     HENT: Negative for congestion, ear pain, hearing loss, mouth sores, postnasal drip, rhinorrhea, sore throat, trouble swallowing and voice change  Eyes: Negative for pain, redness and visual disturbance  Respiratory: Negative for cough, chest tightness, shortness of breath and wheezing  Cardiovascular: Negative for chest pain, palpitations and leg swelling  Gastrointestinal: Negative for abdominal distention, abdominal pain, blood in stool, constipation, diarrhea and nausea  Endocrine: Negative for cold intolerance, heat intolerance, polydipsia, polyphagia and polyuria  Genitourinary: Negative for difficulty urinating, dysuria, flank pain, frequency, hematuria and urgency  Musculoskeletal: Negative for arthralgias, back pain, gait problem, joint swelling and myalgias  Skin: Negative for color change and pallor  Neurological: Negative for dizziness, tremors, seizures, syncope, weakness, numbness and headaches  Hematological: Negative for adenopathy  Does not bruise/bleed easily  Psychiatric/Behavioral: Negative  Negative for sleep disturbance  The patient is not nervous/anxious  Video Exam    There were no vitals filed for this visit  Physical Exam  HENT:      Head: Normocephalic  Eyes:      Conjunctiva/sclera: Conjunctivae normal    Pulmonary:      Effort: Pulmonary effort is normal  No respiratory distress  Neurological:      Mental Status: He is alert and oriented to person, place, and time  Psychiatric:         Mood and Affect: Mood normal          Behavior: Behavior normal           I spent 15 minutes directly with the patient during this visit      VIRTUAL VISIT [address] acknowledges that he has consented to an online visit or consultation   He understands that the online visit is based solely on information provided by him, and that, in the absence of a face-to-face physical evaluation by the physician, the diagnosis he receives is both limited and provisional in terms of accuracy and completeness  This is not intended to replace a full medical face-to-face evaluation by the physician  Sita Forrester understands and accepts these terms

## 2021-02-03 NOTE — PROGRESS NOTES
Assessment and Plan:     Problem List Items Addressed This Visit     None        BMI Counseling: There is no height or weight on file to calculate BMI  The BMI is above normal  Nutrition recommendations include decreasing portion sizes, encouraging healthy choices of fruits and vegetables, decreasing fast food intake, consuming healthier snacks, limiting drinks that contain sugar, moderation in carbohydrate intake, increasing intake of lean protein, reducing intake of saturated and trans fat and reducing intake of cholesterol  Exercise recommendations include exercising 3-5 times per week  No pharmacotherapy was ordered  Patient referred to PCP due to patient being overweight  Preventive health issues were discussed with patient, and age appropriate screening tests were ordered as noted in patient's After Visit Summary  Personalized health advice and appropriate referrals for health education or preventive services given if needed, as noted in patient's After Visit Summary       History of Present Illness:     Patient presents for Medicare Annual Wellness visit    Patient Care Team:  Mary Heller MD as PCP - General  MD Ashli Blancas (Vascular Surgery)  Olga Lidia Denis MD (Vascular Surgery)  Dawson Rincon MD (Rheumatology)     Problem List:     Patient Active Problem List   Diagnosis    Type II diabetes mellitus with peripheral circulatory disorder [facility])    Peripheral vascular disease ([facility] )    AAA (abdominal aortic aneurysm) without rupture ([facility] )    Essential hypertension    Pure hypercholesterolemia    [place] post (EVAR) endovascular aneurysm repair, initial encounter ([place] [id number] )    History of acute inferior wall MI    Preop cardiovascular exam    Vitamin D deficiency    Subclinical hypothyroidism    [facility]    Guaiac positive stools      Past Medical and Surgical History:     Past Medical History:   Diagnosis Date    Allergic rhinitis     resolved 12/15/2017    Anemia     resolved 12/15/2017    Arthritis     Cataract     resolved 12/15/2017    Coronary artery disease     Hearing problem     Heart attack ([address] )     Hiatal hernia     Hyperlipidemia     Hypertension     Impaired fasting glucose     resolved 12/15/2017    Numbness of leg     resolved 12/15/2017    Pneumonia 03/2019    Polyarthritis     resolved 12/15/2017    PONV (postoperative nausea and vomiting)     PVD (peripheral vascular disease) (HCC)      Past Surgical History:   Procedure Laterality Date    CARDIAC SURGERY      CATARACT EXTRACTION Bilateral     CORONARY ANGIOPLASTY WITH STENT PLACEMENT      IR EVAR      IR EVAR  7/1/2019    OTHER SURGICAL HISTORY      detatched bicep tendon    [place] EVASC RPR DPLMNT AORTO-AORTIC NDGFT N/A 7/1/2019    Procedure: REVISION EVAR WITH AORTIC EXTENSION CUFF X3 WITH 11V13cc GORE, RIGHT RENAL STENTING WITH 6X16mm iCASt;  Surgeon: Jose Swanson MD;  [facility];  Service: Vascular      Family History:     Family History   Problem Relation Age of Onset    Anemia Mother     No Known Problems Father       Social History:        Social History     Socioeconomic History    Marital status: /Civil Union     Spouse name: Not on file    Number of children: Not on file    Years of education: Not on file    Highest education level: Not on file   Occupational History    Not on file   Social Needs    Financial resource strain: Not on file    Food insecurity     Worry: Not on file     Inability: Not on file   [employer] needs     Medical: Not on file     Non-medical: Not on file   Tobacco Use    Smoking status: Former Smoker    Smokeless tobacco: Never Used    Tobacco comment: last assessed 12/06/2017   Substance and Sexual Activity    Alcohol use: Yes     Frequency: 4 or more times a week     Drinks per session: 1 or 2    Drug use: Never    Sexual activity: Not Currently   Lifestyle    Physical activity     Days per week: Not on file     Minutes per session: Not on file    Stress: Not on file   Relationships    Social connections     Talks on phone: Not on file     Gets together: Not on file     Attends [employer] service: Not on file     Active member of club or organization: Not on file     Attends meetings of clubs or organizations: Not on file     Relationship status: Not on file    Intimate partner violence     Fear of current or ex partner: Not on file     Emotionally abused: Not on file     Physically abused: Not on file     Forced sexual activity: Not on file   Other Topics Concern    Not on file   Social History Narrative    Not on file      Medications and Allergies:     Current Outpatient Medications   Medication Sig Dispense Refill    acetaminophen (TYLENOL) 325 mg tablet Take 2 tablets (650 mg total) by mouth every 6 (six) hours as needed for mild pain      amLODIPine (NORVASC) 5 mg tablet Take 1 tablet (5 mg total) by mouth daily 30 tablet 5    Ascorbic Acid (VITAMIN C PO) Take 1,000 mg by mouth daily      aspirin 81 MG tablet Take 1 tablet by mouth daily      atorvastatin (LIPITOR) 40 mg tablet take 1 tablet (40MG TOTAL by mouth once daily 90 tablet 0    bisoprolol (ZEBETA) 5 mg tablet take 1 tablet (5 MG TOTAL) by mouth once daily 90 tablet 0    Blood Pressure KIT by Does not apply route 2 (two) times a week 1 each 0    Cholecalciferol (Vitamin D3) 25 MCG (1000 UT) CAPS TAKE 1 CAPSULE ON MON WED  AND FRI  30 capsule 2    clotrimazole-betamethasone (LOTRISONE) 1-0 05 % cream apply topically to affected area twice a day 30 g 1    FeroSul 325 (65 Fe) MG tablet TAKE 1 TABLET  ON MON WED  AND FRI   12 tablet 1    folic acid (FOLVITE) 1 mg tablet take 1 tablet by mouth once daily 90 tablet 1    glucose blood (TRUETRACK TEST) test strip by In Vitro route 2 (two) times a day      indapamide (LOZOL) 2 5 mg tablet TAKE 1 TABLET (2 5 MG TOTAL) BY MOUTH DAILY 90 tablet 0    ketoconazole (NIZORAL) 2 % cream Apply topically daily 15 g 0    Lysine HCl 1000 MG TABS Take 1,000 mg by mouth daily       magnesium chloride-calcium (MAG-SR PLUS CALCIUM)  mg Take 2 tablets by mouth daily      multivitamin-minerals (CENTRUM ADULTS) tablet Take 1 tablet by mouth daily      omeprazole (PriLOSEC) 40 MG capsule take 1 capsule by mouth once daily 90 capsule 0    predniSONE 1 MG TBEC 1 mg daily      ramipril (ALTACE) 10 MG capsule TAKE 1 CAPSULE (10 MG TOTAL) BY MOUTH DAILY 90 capsule 1    tamsulosin (FLOMAX) 0 4 mg take 1 capsule by mouth once daily 30 capsule 4     No current facility-administered medications for this visit  Allergies   Allergen Reactions    Sulfamethoxazole-Trimethoprim Nausea Only      Immunizations:     Immunization History   Administered Date(s) Administered    INFLUENZA 12/08/2004, 10/24/2005, 09/19/2011, 09/30/2014, 09/01/2015, 10/07/2016, 11/02/2017    Influenza Quadrivalent Preservative Free 3 years and older IM 09/01/2015    Influenza Split High Dose Preservative Free IM 10/07/2016, 11/02/2017, 11/02/2017    Influenza, high dose seasonal 0 7 mL 09/26/2018, 10/30/2019, 11/04/2020    Influenza, seasonal, injectable 08/24/2012, 09/25/2013, 09/30/2014    Pneumococcal Conjugate 13-Valent 04/20/2015    Pneumococcal Polysaccharide PPV23 02/01/2008, 08/18/2014, 02/21/2018    Zoster 08/26/2010    Zoster Vaccine Recombinant 06/29/2018, 09/27/2018      Health Maintenance:         Topic Date Due    Colonoscopy Surveillance  12/06/2013         Topic Date Due    DTaP,Tdap,and Td Vaccines (1 - Tdap) 02/19/1956      Medicare Health Risk Assessment:     There were no vitals taken for this visit  Arlen Cabral is here for his Subsequent Wellness visit  Health Risk Assessment:   Patient rates overall health as good  Patient feels that their physical health rating is same  Eyesight was rated as same  Hearing was rated as same   Patient feels that their emotional and mental health rating is same  Pain experienced in the last 7 days has been some  Patient's pain rating has been 4/10  Pain does not affect his activities of daily living the good news is that this CRP is less than 1 and is only taking 1 mg of prednisone    Depression Screening:   PHQ-2 Score: 0      Fall Risk Screening: In the past year, patient has experienced: no history of falling in past year      Home Safety:  Patient does not have trouble with stairs inside or outside of their home  Patient has working smoke alarms and has working carbon monoxide detector  Home safety hazards include: not having non-slip bath and/or shower mats  Nutrition:   Current diet is Regular, Limited junk food, Low Cholesterol and No Added Salt  Medications:   Patient is currently taking over-the-counter supplements  OTC medications include: see medication list  Patient is able to manage medications  Activities of Daily Living (ADLs)/Instrumental Activities of Daily Living (IADLs):   Walk and transfer into and out of bed and chair?: Yes  Dress and groom yourself?: Yes    Bathe or shower yourself?: Yes    Feed yourself? Yes  Do your laundry/housekeeping?: Yes  Manage your money, pay your bills and track your expenses?: Yes  Make your own meals?: Yes    Do your own shopping?: Yes    Previous Hospitalizations:   Any hospitalizations or ED visits within the last 12 months?: No      Advance Care Planning:   Living will: Yes    Durable POA for healthcare:  Yes    Advanced directive: Yes    End of Life Decisions reviewed with patient: Yes      Cognitive Screening:   Provider or family/friend/caregiver concerned regarding cognition?: No    PREVENTIVE SCREENINGS      Cardiovascular Screening:    General: Screening Not Indicated and History Lipid Disorder      Diabetes Screening:     General: Screening Not Indicated and History Diabetes      Colorectal Cancer Screening:     General: Screening Current      Prostate Cancer Screening:    General: Screening Not Indicated      Osteoporosis Screening:    General: Risks and Benefits Discussed      Abdominal Aortic Aneurysm (AAA) Screening:    Risk factors include: tobacco use        General: Screening Not Indicated and History AAA      Lung Cancer Screening:     General: Screening Not Indicated      Hepatitis C Screening:    General: Risks and Benefits Discussed    Hep C Screening Accepted: No     Other Counseling Topics:   Alcohol use counseling, car/seat belt/driving safety, skin self-exam, sunscreen and regular weightbearing exercise and calcium and vitamin D intake  Guillermo Hedrick MD  Virtual AWV Consent    Reason for visit is     Encounter provider Guillermo Hedrick MD    Provider located at [facility] [phone]      Recent Visits  No visits were found meeting these conditions  Showing recent visits within past 7 days and meeting all other requirements     Today's Visits  Date Type Provider Dept   02/03/21 Telemedicine Guillermo Hedrick MD  Internal Med [facility]   Showing today's visits and meeting all other requirements     Future Appointments  No visits were found meeting these conditions  Showing future appointments within next 150 days and meeting all other requirements        After connecting through [employer], the patient was identified by name and date of birth  Manny Banis was informed that this is a telemedicine visit and that the visit is being conducted through [employer] and patient was informed that this is not a secure, HIPAA-compliant platform  He agrees to proceed  My office door was closed  No one else was in the room  He acknowledged consent and understanding of privacy and security of the video platform  The patient has agreed to participate and understands they can discontinue the visit at any time    Patient is aware this is a billable service

## 2021-02-03 NOTE — PATIENT INSTRUCTIONS

## 2021-02-06 ENCOUNTER — IMMUNIZATIONS (OUTPATIENT)
Dept: FAMILY MEDICINE CLINIC | Facility: HOSPITAL | Age: 86
End: 2021-02-06

## 2021-02-06 DIAGNOSIS — Z23 ENCOUNTER FOR IMMUNIZATION: Primary | ICD-10-CM

## 2021-02-06 PROCEDURE — 91301 SARS-COV-2 / COVID-19 MRNA VACCINE (MODERNA) 100 MCG: CPT

## 2021-02-06 PROCEDURE — 0011A SARS-COV-2 / COVID-19 MRNA VACCINE (MODERNA) 100 MCG: CPT

## 2021-02-23 DIAGNOSIS — I10 ESSENTIAL HYPERTENSION: ICD-10-CM

## 2021-02-23 DIAGNOSIS — K21.9 GASTROESOPHAGEAL REFLUX DISEASE WITHOUT ESOPHAGITIS: ICD-10-CM

## 2021-02-23 DIAGNOSIS — R35.0 BENIGN PROSTATIC HYPERPLASIA WITH URINARY FREQUENCY: ICD-10-CM

## 2021-02-23 DIAGNOSIS — N40.1 BENIGN PROSTATIC HYPERPLASIA WITH URINARY FREQUENCY: ICD-10-CM

## 2021-02-23 DIAGNOSIS — E78.00 PURE HYPERCHOLESTEROLEMIA: ICD-10-CM

## 2021-02-23 RX ORDER — RAMIPRIL 10 MG/1
10 CAPSULE ORAL DAILY
Qty: 90 CAPSULE | Refills: 1 | Status: SHIPPED | OUTPATIENT
Start: 2021-02-23 | End: 2022-01-03

## 2021-02-23 RX ORDER — OMEPRAZOLE 40 MG/1
CAPSULE, DELAYED RELEASE ORAL
Qty: 90 CAPSULE | Refills: 0 | Status: SHIPPED | OUTPATIENT
Start: 2021-02-23 | End: 2021-06-06

## 2021-02-23 RX ORDER — INDAPAMIDE 2.5 MG/1
2.5 TABLET, FILM COATED ORAL DAILY
Qty: 90 TABLET | Refills: 0 | Status: SHIPPED | OUTPATIENT
Start: 2021-02-23 | End: 2021-06-06

## 2021-02-23 RX ORDER — BISOPROLOL FUMARATE 5 MG/1
TABLET ORAL
Qty: 90 TABLET | Refills: 0 | Status: SHIPPED | OUTPATIENT
Start: 2021-02-23 | End: 2021-06-06

## 2021-02-23 RX ORDER — TAMSULOSIN HYDROCHLORIDE 0.4 MG/1
CAPSULE ORAL
Qty: 30 CAPSULE | Refills: 4 | Status: SHIPPED | OUTPATIENT
Start: 2021-02-23 | End: 2021-08-23

## 2021-02-23 RX ORDER — FOLIC ACID 1 MG/1
TABLET ORAL
Qty: 90 TABLET | Refills: 1 | Status: SHIPPED | OUTPATIENT
Start: 2021-02-23 | End: 2021-10-25

## 2021-02-23 RX ORDER — ATORVASTATIN CALCIUM 40 MG/1
TABLET, FILM COATED ORAL
Qty: 90 TABLET | Refills: 0 | Status: SHIPPED | OUTPATIENT
Start: 2021-02-23 | End: 2021-06-06

## 2021-03-04 ENCOUNTER — IMMUNIZATIONS (OUTPATIENT)
Dept: FAMILY MEDICINE CLINIC | Facility: HOSPITAL | Age: 86
End: 2021-03-04

## 2021-03-04 DIAGNOSIS — Z23 ENCOUNTER FOR IMMUNIZATION: Primary | ICD-10-CM

## 2021-03-04 PROCEDURE — 0012A SARS-COV-2 / COVID-19 MRNA VACCINE (MODERNA) 100 MCG: CPT

## 2021-03-04 PROCEDURE — 91301 SARS-COV-2 / COVID-19 MRNA VACCINE (MODERNA) 100 MCG: CPT

## 2021-06-02 ENCOUNTER — OFFICE VISIT (OUTPATIENT)
Dept: INTERNAL MEDICINE CLINIC | Facility: CLINIC | Age: 86
End: 2021-06-02
Payer: COMMERCIAL

## 2021-06-02 VITALS
WEIGHT: 202 LBS | BODY MASS INDEX: 32.47 KG/M2 | TEMPERATURE: 97.7 F | DIASTOLIC BLOOD PRESSURE: 78 MMHG | SYSTOLIC BLOOD PRESSURE: 170 MMHG | RESPIRATION RATE: 12 BRPM | HEART RATE: 80 BPM | HEIGHT: 66 IN

## 2021-06-02 DIAGNOSIS — I10 ESSENTIAL HYPERTENSION: ICD-10-CM

## 2021-06-02 DIAGNOSIS — M35.3 POLYMYALGIA RHEUMATICA (HCC): ICD-10-CM

## 2021-06-02 DIAGNOSIS — D69.3 IDIOPATHIC THROMBOCYTOPENIC PURPURA (HCC): ICD-10-CM

## 2021-06-02 DIAGNOSIS — I71.4 AAA (ABDOMINAL AORTIC ANEURYSM) WITHOUT RUPTURE (HCC): Chronic | ICD-10-CM

## 2021-06-02 DIAGNOSIS — E11.51 TYPE II DIABETES MELLITUS WITH PERIPHERAL CIRCULATORY DISORDER (HCC): Primary | ICD-10-CM

## 2021-06-02 DIAGNOSIS — R20.2 NUMBNESS AND TINGLING IN LEFT ARM: ICD-10-CM

## 2021-06-02 DIAGNOSIS — I25.2 HISTORY OF ACUTE INFERIOR WALL MI: ICD-10-CM

## 2021-06-02 DIAGNOSIS — R20.0 NUMBNESS AND TINGLING IN LEFT ARM: ICD-10-CM

## 2021-06-02 DIAGNOSIS — E78.00 PURE HYPERCHOLESTEROLEMIA: ICD-10-CM

## 2021-06-02 DIAGNOSIS — E55.9 VITAMIN D DEFICIENCY: ICD-10-CM

## 2021-06-02 DIAGNOSIS — E03.8 SUBCLINICAL HYPOTHYROIDISM: ICD-10-CM

## 2021-06-02 DIAGNOSIS — I73.9 PERIPHERAL VASCULAR DISEASE (HCC): ICD-10-CM

## 2021-06-02 PROCEDURE — 99214 OFFICE O/P EST MOD 30 MIN: CPT | Performed by: INTERNAL MEDICINE

## 2021-06-02 PROCEDURE — 3077F SYST BP >= 140 MM HG: CPT | Performed by: INTERNAL MEDICINE

## 2021-06-02 PROCEDURE — 1160F RVW MEDS BY RX/DR IN RCRD: CPT | Performed by: INTERNAL MEDICINE

## 2021-06-02 PROCEDURE — 1036F TOBACCO NON-USER: CPT | Performed by: INTERNAL MEDICINE

## 2021-06-02 RX ORDER — AMLODIPINE BESYLATE 10 MG/1
10 TABLET ORAL DAILY
Qty: 30 TABLET | Refills: 2 | Status: SHIPPED | OUTPATIENT
Start: 2021-06-02 | End: 2021-09-28 | Stop reason: SDUPTHER

## 2021-06-02 NOTE — PATIENT INSTRUCTIONS
Watch her salt intake to better control your blood pressure will going to increase the amlodipine from 5-10 mg per day    Monitor your blood pressure at home and call us with the results at least twice a week in the morning and evening

## 2021-06-02 NOTE — PROGRESS NOTES
Assessment/Plan:  Amlodipine increased to 10 mg per day for better blood pressure control back in 5 weeks EMG of the upper extremities with numbness and tingling is in the left arm C-spine x-ray    1  High blood pressure can a labile blood pressure here was initially 170/70 came down to 150/80 no change with standing  Will going to increase the amlodipine from 5-10 mg per day going to have him come back in 5 weeks for follow-up  Coronary artery disease no chest pain no angina he is euvolemic  3  Hyperlipidemia atorvastatin 40 mg per day continue same medication or plan  4  Numbness and tingling is the left arm  Will check an EMG and a cervical spine x-ray he did have a history of DJD and neck pain in the past     Polymyalgia rheumatica being followed by Rheumatology he is on prednisone 1 mg every other day does have some shoulder discomfort will check a sed rate and CRP            No problem-specific Assessment & Plan notes found for this encounter  Diagnoses and all orders for this visit:    Type II diabetes mellitus with peripheral circulatory disorder ([facility] )    Subclinical hypothyroidism    Essential hypertension    Pure hypercholesterolemia    Vitamin D deficiency    Peripheral vascular disease ([facility] )    AAA (abdominal aortic aneurysm) without rupture (HCC)    Polymyalgia rheumatica (HCC)          Subjective:      Patient ID: Parminder Fields is a 81 y o  male  No chief complaint on file          Current Outpatient Medications:     acetaminophen (TYLENOL) 325 mg tablet, Take 2 tablets (650 mg total) by mouth every 6 (six) hours as needed for mild pain, Disp: , Rfl:     amLODIPine (NORVASC) 5 mg tablet, Take 1 tablet (5 mg total) by mouth daily, Disp: 30 tablet, Rfl: 5    Ascorbic Acid (VITAMIN C PO), Take 1,000 mg by mouth daily, Disp: , Rfl:     aspirin 81 MG tablet, Take 1 tablet by mouth daily, Disp: , Rfl:     atorvastatin (LIPITOR) 40 mg tablet, take 1 tablet (40MG TOTAL) by mouth once daily, Disp: 90 tablet, Rfl: 0    bisoprolol (ZEBETA) 5 mg tablet, take 1 tablet (5 MG TOTAL) by mouth once daily, Disp: 90 tablet, Rfl: 0    Blood Pressure KIT, by Does not apply route 2 (two) times a week, Disp: 1 each, Rfl: 0    Cholecalciferol (Vitamin D3) 25 MCG (1000 UT) CAPS, TAKE 1 CAPSULE ON MON WED  AND FRI , Disp: 30 capsule, Rfl: 2    clotrimazole-betamethasone (LOTRISONE) 1-0 05 % cream, apply topically to affected area twice a day, Disp: 30 g, Rfl: 1    FeroSul 325 (65 Fe) MG tablet, TAKE 1 TABLET  ON MON WED  AND FRI , Disp: 12 tablet, Rfl: 1    folic acid (FOLVITE) 1 mg tablet, take 1 tablet by mouth once daily, Disp: 90 tablet, Rfl: 1    glucose blood (TRUETRACK TEST) test strip, by In Vitro route 2 (two) times a day, Disp: , Rfl:     indapamide (LOZOL) 2 5 mg tablet, TAKE 1 TABLET (2 5 MG TOTAL) BY MOUTH DAILY, Disp: 90 tablet, Rfl: 0    ketoconazole (NIZORAL) 2 % cream, Apply topically daily, Disp: 15 g, Rfl: 0    Lysine HCl 1000 MG TABS, Take 1,000 mg by mouth daily , Disp: , Rfl:     magnesium chloride-calcium (MAG-SR PLUS CALCIUM)  mg, Take 2 tablets by mouth daily, Disp: , Rfl:     multivitamin-minerals (CENTRUM ADULTS) tablet, Take 1 tablet by mouth daily, Disp: , Rfl:     omeprazole (PriLOSEC) 40 MG capsule, take 1 capsule by mouth once daily, Disp: 90 capsule, Rfl: 0    predniSONE 1 MG TBEC, 1 mg daily, Disp: , Rfl:     ramipril (ALTACE) 10 MG capsule, TAKE 1 CAPSULE (10 MG TOTAL) BY MOUTH DAILY, Disp: 90 capsule, Rfl: 1    tamsulosin (FLOMAX) 0 4 mg, take 1 capsule by mouth once daily, Disp: 30 capsule, Rfl: 4    HPI    The following portions of the patient's history were reviewed and updated as appropriate: allergies, current medications, past family history, past medical history, past social history, past surgical history and problem list     Review of Systems   Constitutional: Negative    Negative for activity change, appetite change, fatigue, fever and unexpected weight change  HENT: Negative for congestion, ear pain, hearing loss, mouth sores, postnasal drip, rhinorrhea, sore throat, trouble swallowing and voice change  Eyes: Negative for pain, redness and visual disturbance  Respiratory: Negative for cough, chest tightness, shortness of breath and wheezing  Cardiovascular: Negative for chest pain, palpitations and leg swelling  Gastrointestinal: Negative for abdominal distention, abdominal pain, blood in stool, constipation, diarrhea and nausea  Endocrine: Negative for cold intolerance, heat intolerance, polydipsia, polyphagia and polyuria  Genitourinary: Negative for difficulty urinating, dysuria, flank pain, frequency, hematuria and urgency  Musculoskeletal: Positive for neck pain  Negative for arthralgias, back pain, gait problem, joint swelling and myalgias  Skin: Negative for color change and pallor  Neurological: Positive for numbness  Negative for dizziness, tremors, seizures, syncope, weakness and headaches  Hematological: Negative for adenopathy  Does not bruise/bleed easily  Psychiatric/Behavioral: Negative  Negative for sleep disturbance  The patient is not nervous/anxious  Objective: There were no vitals taken for this visit  Physical Exam  Vitals signs and nursing note reviewed  Constitutional:       Appearance: He is well-developed  HENT:      Head: Normocephalic  Right Ear: External ear normal       Left Ear: External ear normal       Nose: Nose normal       Mouth/Throat:      Pharynx: No oropharyngeal exudate  Eyes:      Conjunctiva/sclera: Conjunctivae normal       Pupils: Pupils are equal, round, and reactive to light  Neck:      Musculoskeletal: Normal range of motion and neck supple  Thyroid: No thyromegaly  Cardiovascular:      Rate and Rhythm: Normal rate and regular rhythm  Heart sounds: Normal heart sounds  No murmur  No friction rub  No gallop         Comments: S1 the rhythm  Extremities no edema  Blood pressure 170/80 repeated 150/80 no change with standing  Pulmonary:      Effort: Pulmonary effort is normal  No respiratory distress  Breath sounds: Normal breath sounds  No wheezing or rales  Comments: Lungs are clear no wheezing rales or rhonchi  Abdominal:      General: Bowel sounds are normal  There is no distension  Palpations: Abdomen is soft  There is no mass  Tenderness: There is no abdominal tenderness  There is no guarding or rebound  Musculoskeletal: Normal range of motion  Lymphadenopathy:      Cervical: No cervical adenopathy  Skin:     General: Skin is warm and dry  Neurological:      Mental Status: He is alert and oriented to person, place, and time  Sensory: No sensory deficit        Coordination: Coordination normal       Deep Tendon Reflexes: Reflexes abnormal       Comments: Sensation to pinprick is intact both upper extremities Tinel sign is negative   Psychiatric:         Behavior: Behavior normal          Judgment: Judgment normal

## 2021-06-03 ENCOUNTER — HOSPITAL ENCOUNTER (OUTPATIENT)
Dept: RADIOLOGY | Facility: HOSPITAL | Age: 86
Discharge: HOME | End: 2021-06-03
Attending: INTERNAL MEDICINE
Payer: COMMERCIAL

## 2021-06-03 DIAGNOSIS — R20.0 NUMBNESS AND TINGLING IN LEFT ARM: ICD-10-CM

## 2021-06-03 DIAGNOSIS — R20.2 NUMBNESS AND TINGLING IN LEFT ARM: ICD-10-CM

## 2021-06-03 PROCEDURE — 72050 X-RAY EXAM NECK SPINE 4/5VWS: CPT

## 2021-06-06 DIAGNOSIS — E61.1 LOW IRON: ICD-10-CM

## 2021-06-06 DIAGNOSIS — I10 ESSENTIAL HYPERTENSION: ICD-10-CM

## 2021-06-06 DIAGNOSIS — E78.00 PURE HYPERCHOLESTEROLEMIA: ICD-10-CM

## 2021-06-06 DIAGNOSIS — K21.9 GASTROESOPHAGEAL REFLUX DISEASE WITHOUT ESOPHAGITIS: ICD-10-CM

## 2021-06-06 RX ORDER — FERROUS SULFATE 325(65) MG
TABLET ORAL
Qty: 12 TABLET | Refills: 1 | Status: SHIPPED | OUTPATIENT
Start: 2021-06-06 | End: 2021-09-15 | Stop reason: SDUPTHER

## 2021-06-06 RX ORDER — INDAPAMIDE 2.5 MG/1
2.5 TABLET, FILM COATED ORAL DAILY
Qty: 90 TABLET | Refills: 0 | Status: SHIPPED | OUTPATIENT
Start: 2021-06-06 | End: 2021-09-28 | Stop reason: SDUPTHER

## 2021-06-06 RX ORDER — OMEPRAZOLE 40 MG/1
CAPSULE, DELAYED RELEASE ORAL
Qty: 90 CAPSULE | Refills: 0 | Status: SHIPPED | OUTPATIENT
Start: 2021-06-06 | End: 2021-09-28 | Stop reason: SDUPTHER

## 2021-06-06 RX ORDER — BISOPROLOL FUMARATE 5 MG/1
TABLET ORAL
Qty: 90 TABLET | Refills: 0 | Status: SHIPPED | OUTPATIENT
Start: 2021-06-06 | End: 2021-09-28 | Stop reason: SDUPTHER

## 2021-06-06 RX ORDER — ATORVASTATIN CALCIUM 40 MG/1
TABLET, FILM COATED ORAL
Qty: 90 TABLET | Refills: 0 | Status: SHIPPED | OUTPATIENT
Start: 2021-06-06 | End: 2021-09-28 | Stop reason: SDUPTHER

## 2021-06-16 LAB
ALBUMIN SERPL-MCNC: 4.2 G/DL (ref 3.6–4.6)
ALBUMIN/CREAT UR: 798 MG/G CREAT (ref 0–29)
ALBUMIN/GLOB SERPL: 1.7 {RATIO} (ref 1.2–2.2)
ALP SERPL-CCNC: 81 IU/L (ref 48–121)
ALT SERPL-CCNC: 41 IU/L (ref 0–44)
APPEARANCE UR: CLEAR
AST SERPL-CCNC: 34 IU/L (ref 0–40)
BACTERIA URNS QL MICRO: NORMAL
BASOPHILS # BLD AUTO: 0 X10E3/UL (ref 0–0.2)
BASOPHILS NFR BLD AUTO: 1 %
BILIRUB SERPL-MCNC: 0.5 MG/DL (ref 0–1.2)
BILIRUB UR QL STRIP: NEGATIVE
BUN SERPL-MCNC: 23 MG/DL (ref 8–27)
BUN/CREAT SERPL: 17 (ref 10–24)
CALCIUM SERPL-MCNC: 9.3 MG/DL (ref 8.6–10.2)
CASTS URNS QL MICRO: NORMAL /LPF
CHLORIDE SERPL-SCNC: 103 MMOL/L (ref 96–106)
CHOLEST SERPL-MCNC: 161 MG/DL (ref 100–199)
CO2 SERPL-SCNC: 26 MMOL/L (ref 20–29)
COLOR UR: YELLOW
CREAT SERPL-MCNC: 1.32 MG/DL (ref 0.76–1.27)
CREAT UR-MCNC: 83.6 MG/DL
CRP SERPL-MCNC: 4 MG/L (ref 0–10)
EOSINOPHIL # BLD AUTO: 0.3 X10E3/UL (ref 0–0.4)
EOSINOPHIL NFR BLD AUTO: 5 %
EPI CELLS #/AREA URNS HPF: NORMAL /HPF (ref 0–10)
ERYTHROCYTE [DISTWIDTH] IN BLOOD BY AUTOMATED COUNT: 14.5 % (ref 11.6–15.4)
ERYTHROCYTE [SEDIMENTATION RATE] IN BLOOD BY WESTERGREN METHOD: 27 MM/HR (ref 0–30)
FOLATE SERPL-MCNC: >20 NG/ML
GGT SERPL-CCNC: 45 IU/L (ref 0–65)
GLOBULIN SER-MCNC: 2.5 G/DL (ref 1.5–4.5)
GLUCOSE SERPL-MCNC: 120 MG/DL (ref 65–99)
GLUCOSE UR QL: NEGATIVE
HBA1C MFR BLD: 6.5 % (ref 4.8–5.6)
HCT VFR BLD AUTO: 38.6 % (ref 37.5–51)
HDLC SERPL-MCNC: 40 MG/DL
HGB BLD-MCNC: 13.1 G/DL (ref 13–17.7)
HGB UR QL STRIP: NEGATIVE
IMM GRANULOCYTES # BLD: 0 X10E3/UL (ref 0–0.1)
IMM GRANULOCYTES NFR BLD: 1 %
KETONES UR QL STRIP: NEGATIVE
LDLC SERPL CALC-MCNC: 99 MG/DL (ref 0–99)
LEUKOCYTE ESTERASE UR QL STRIP: NEGATIVE
LYMPHOCYTES # BLD AUTO: 1.4 X10E3/UL (ref 0.7–3.1)
LYMPHOCYTES NFR BLD AUTO: 25 %
MAGNESIUM SERPL-MCNC: 1.6 MG/DL (ref 1.6–2.3)
MCH RBC QN AUTO: 29.7 PG (ref 26.6–33)
MCHC RBC AUTO-ENTMCNC: 33.9 G/DL (ref 31.5–35.7)
MCV RBC AUTO: 88 FL (ref 79–97)
METHYLMALONATE SERPL-SCNC: 177 NMOL/L (ref 0–378)
MICRO URNS: NORMAL
MICROALBUMIN UR-MCNC: 666.9 UG/ML
MONOCYTES # BLD AUTO: 0.7 X10E3/UL (ref 0.1–0.9)
MONOCYTES NFR BLD AUTO: 12 %
NEUTROPHILS # BLD AUTO: 3.1 X10E3/UL (ref 1.4–7)
NEUTROPHILS NFR BLD AUTO: 56 %
NITRITE UR QL STRIP: NEGATIVE
PH UR STRIP: 6 [PH] (ref 5–7.5)
PLATELET # BLD AUTO: 173 X10E3/UL (ref 150–450)
POTASSIUM SERPL-SCNC: 4.9 MMOL/L (ref 3.5–5.2)
PROT SERPL-MCNC: 6.7 G/DL (ref 6–8.5)
PROT UR QL STRIP: NORMAL
RBC # BLD AUTO: 4.41 X10E6/UL (ref 4.14–5.8)
RBC #/AREA URNS HPF: NORMAL /HPF (ref 0–2)
SL AMB DISCLAIMER: NORMAL
SL AMB EGFR AFRICAN AMERICAN: 56 ML/MIN/1.73
SL AMB EGFR NON AFRICAN AMERICAN: 49 ML/MIN/1.73
SL AMB T4, FREE (DIRECT): 1.25 NG/DL (ref 0.82–1.77)
SL AMB VLDL CHOLESTEROL CALC: 22 MG/DL (ref 5–40)
SODIUM SERPL-SCNC: 141 MMOL/L (ref 134–144)
SP GR UR: 1.02 (ref 1–1.03)
TRIGL SERPL-MCNC: 124 MG/DL (ref 0–149)
TSH SERPL DL<=0.005 MIU/L-ACNC: 5.49 UIU/ML (ref 0.45–4.5)
UROBILINOGEN UR STRIP-ACNC: 0.2 MG/DL (ref 0.2–1)
VIT B12 SERPL-MCNC: 858 PG/ML (ref 232–1245)
WBC # BLD AUTO: 5.5 X10E3/UL (ref 3.4–10.8)
WBC #/AREA URNS HPF: NORMAL /HPF (ref 0–5)

## 2021-06-18 ENCOUNTER — TELEPHONE (OUTPATIENT)
Dept: INTERNAL MEDICINE CLINIC | Facility: CLINIC | Age: 86
End: 2021-06-18

## 2021-06-18 NOTE — TELEPHONE ENCOUNTER
----- Message from Hilario Wilson MD sent at 6/9/2021  9:59 PM EDT -----  Please call the patient regarding his abnormal result   Cervical spine  Xray  Severe  djd  MRI  C  Spine recommended  No  Contrast  Arm pain  Numbness  Left arm  Cervical radiculopathy  !!!

## 2021-06-21 ENCOUNTER — TELEPHONE (OUTPATIENT)
Dept: INTERNAL MEDICINE CLINIC | Facility: CLINIC | Age: 86
End: 2021-06-21

## 2021-06-21 DIAGNOSIS — R20.0 LEFT ARM NUMBNESS: ICD-10-CM

## 2021-06-21 DIAGNOSIS — M79.602 LEFT ARM PAIN: Primary | ICD-10-CM

## 2021-06-21 DIAGNOSIS — M54.12 CERVICAL RADICULOPATHY: ICD-10-CM

## 2021-06-21 NOTE — TELEPHONE ENCOUNTER
Patient aware of results  Scheduled patient at [facility] 07/07 at 12 noon  Patient aware of appt time and date

## 2021-06-21 NOTE — TELEPHONE ENCOUNTER
----- Message from Milly Montalvo MD sent at 6/9/2021  9:59 PM EDT -----  Please call the patient regarding his abnormal result   Cervical spine  Xray  Severe  djd  MRI  C  Spine recommended  No  Contrast  Arm pain  Numbness  Left arm  Cervical radiculopathy  !!!

## 2021-07-07 ENCOUNTER — HOSPITAL ENCOUNTER (OUTPATIENT)
Dept: MRI IMAGING | Facility: HOSPITAL | Age: 86
Discharge: HOME | End: 2021-07-07
Attending: INTERNAL MEDICINE
Payer: COMMERCIAL

## 2021-07-07 DIAGNOSIS — R20.0 LEFT ARM NUMBNESS: ICD-10-CM

## 2021-07-07 DIAGNOSIS — M79.602 LEFT ARM PAIN: ICD-10-CM

## 2021-07-07 DIAGNOSIS — M54.12 CERVICAL RADICULOPATHY: ICD-10-CM

## 2021-07-07 PROCEDURE — G1004 CDSM NDSC: HCPCS

## 2021-07-07 PROCEDURE — 72141 MRI NECK SPINE W/O DYE: CPT

## 2021-07-08 ENCOUNTER — OFFICE VISIT (OUTPATIENT)
Dept: INTERNAL MEDICINE CLINIC | Facility: CLINIC | Age: 86
End: 2021-07-08
Payer: COMMERCIAL

## 2021-07-08 VITALS
OXYGEN SATURATION: 95 % | RESPIRATION RATE: 16 BRPM | HEIGHT: 66 IN | BODY MASS INDEX: 32.14 KG/M2 | SYSTOLIC BLOOD PRESSURE: 148 MMHG | HEART RATE: 67 BPM | WEIGHT: 200 LBS | DIASTOLIC BLOOD PRESSURE: 80 MMHG | TEMPERATURE: 97.6 F

## 2021-07-08 DIAGNOSIS — N18.30 STAGE 3 CHRONIC KIDNEY DISEASE, UNSPECIFIED WHETHER STAGE 3A OR 3B CKD (HCC): ICD-10-CM

## 2021-07-08 DIAGNOSIS — E55.9 VITAMIN D DEFICIENCY: ICD-10-CM

## 2021-07-08 DIAGNOSIS — M35.3 POLYMYALGIA RHEUMATICA (HCC): ICD-10-CM

## 2021-07-08 DIAGNOSIS — I73.9 PERIPHERAL VASCULAR DISEASE (HCC): ICD-10-CM

## 2021-07-08 DIAGNOSIS — I71.4 AAA (ABDOMINAL AORTIC ANEURYSM) WITHOUT RUPTURE (HCC): Chronic | ICD-10-CM

## 2021-07-08 DIAGNOSIS — E78.00 PURE HYPERCHOLESTEROLEMIA: ICD-10-CM

## 2021-07-08 DIAGNOSIS — E03.8 SUBCLINICAL HYPOTHYROIDISM: ICD-10-CM

## 2021-07-08 DIAGNOSIS — E11.51 TYPE II DIABETES MELLITUS WITH PERIPHERAL CIRCULATORY DISORDER (HCC): Primary | ICD-10-CM

## 2021-07-08 DIAGNOSIS — I10 ESSENTIAL HYPERTENSION: ICD-10-CM

## 2021-07-08 PROCEDURE — 3077F SYST BP >= 140 MM HG: CPT | Performed by: INTERNAL MEDICINE

## 2021-07-08 PROCEDURE — 99214 OFFICE O/P EST MOD 30 MIN: CPT | Performed by: INTERNAL MEDICINE

## 2021-07-08 PROCEDURE — 3079F DIAST BP 80-89 MM HG: CPT | Performed by: INTERNAL MEDICINE

## 2021-07-08 PROCEDURE — 1160F RVW MEDS BY RX/DR IN RCRD: CPT | Performed by: INTERNAL MEDICINE

## 2021-07-08 PROCEDURE — 1036F TOBACCO NON-USER: CPT | Performed by: INTERNAL MEDICINE

## 2021-07-08 NOTE — PROGRESS NOTES
Assessment/Plan:      FOR 1 BLOOD PRESSURE ACCEPTABLE CONTROL AT THIS TIME IS GOING TO MONITOR HIS BLOOD PRESSURE AT HOME AND BRING SOME OF THE READINGS AT LEAST TWICE A WEEK IN THE MORNING AND EVENING HE IS ON THE FOLLOWING MEDICATIONS  BISOPROLOL 5 MG  AMLODIPINE 10 MG  RAMIPRIL 10 MG  INDAPAMIDE 2 5 MG    2  PERIPHERAL VASCULAR DISEASE AND CORONARY ARTERY DISEASE STABLE YOUR HE IS EUVOLEMIC NO EVIDENCE OF HEART FAILURE  IS ON A BABY ASPIRIN 81 MG DAILY    3  HYPERLIPIDEMIA ON LIPITOR 40 MG PER DAY NO CHANGE IN MEDICATION OR PLAN    4  SHOULDER AND LEFT ARM DISCOMFORT BEING WORKED UP AS CERVICAL RADICULOPATHY EMG OF THE UPPER EXTREMITIES PENDING AS WELL AS THE MRI OF THE C-SPINE READING  HE HAS A LOT OF ARTHRITIS IN THE CERVICAL SPINE X-RAY  5  POLYMYALGIA RHEUMATICA A BEING FOLLOWED BY RHEUMATOLOGIST HE IS NOW UP TO ON 2 MG OF PREDNISONE PER DAY          Results for orders placed or performed in visit on 06/10/21   CBC and differential   Result Value Ref Range    White Blood Cell Count 5 5 3 4 - 10 8 x10E3/uL    Red Blood Cell Count 4 41 4 14 - 5 80 x10E6/uL    Hemoglobin 13 1 13 0 - 17 7 g/dL    HCT 38 6 37 5 - 51 0 %    MCV 88 79 - 97 fL    MCH 29 7 26 6 - 33 0 pg    MCHC 33 9 31 5 - 35 7 g/dL    RDW 14 5 11 6 - 15 4 %    Platelet Count 779 730 - 450 x10E3/uL    Neutrophils 56 Not Estab  %    Lymphocytes 25 Not Estab  %    Monocytes 12 Not Estab  %    Eosinophils 5 Not Estab  %    Basophils PCT 1 Not Estab  %    Neutrophils (Absolute) 3 1 1 4 - 7 0 x10E3/uL    Lymphocytes (Absolute) 1 4 0 7 - 3 1 x10E3/uL    Monocytes (Absolute) 0 7 0 1 - 0 9 x10E3/uL    Eosinophils (Absolute) 0 3 0 0 - 0 4 x10E3/uL    Basophils ABS 0 0 0 0 - 0 2 x10E3/uL    Immature Granulocytes 1 Not Estab  %    Immature Granulocytes (Absolute) 0 0 0 0 - 0 1 x10E3/uL   Comprehensive metabolic panel   Result Value Ref Range    Glucose, Random 120 (H) 65 - 99 mg/dL    BUN 23 8 - 27 mg/dL    Creatinine 1 32 (H) 0 76 - 1 27 mg/dL    eGFR Non  49 (L) >59 mL/min/1 73    eGFR  56 (L) >59 mL/min/1 73    SL AMB BUN/CREATININE RATIO 17 10 - 24    Sodium 141 134 - 144 mmol/L    Potassium 4 9 3 5 - 5 2 mmol/L    Chloride 103 96 - 106 mmol/L    CO2 26 20 - 29 mmol/L    CALCIUM 9 3 8 6 - 10 2 mg/dL    Protein, Total 6 7 6 0 - 8 5 g/dL    Albumin 4 2 3 6 - 4 6 g/dL    Globulin, Total 2 5 1 5 - 4 5 g/dL    Albumin/Globulin Ratio 1 7 1 2 - 2 2    TOTAL BILIRUBIN 0 5 0 0 - 1 2 mg/dL    Alk Phos Isoenzymes 81 48 - 121 IU/L    AST 34 0 - 40 IU/L    ALT 41 0 - 44 IU/L   Urinalysis with microscopic   Result Value Ref Range    Specific Gravity 1 018 1 005 - 1 030    Ph 6 0 5 0 - 7 5    Color UA Yellow Yellow    Urine Appearance Clear Clear    Leukocyte Esterase Negative Negative    Protein 2+ Negative/Trace    Glucose, 24 HR Urine Negative Negative    Ketone, Urine Negative Negative    Blood, Urine Negative Negative    Bilirubin, Urine Negative Negative    Urobilinogen Urine 0 2 0 2 - 1 0 mg/dL    SL AMB NITRITES URINE, QUAL  Negative Negative    Microscopic Examination See below:    Microscopic Examination   Result Value Ref Range    SL AMB WBC, URINE None seen 0 - 5 /hpf    RBC, Urine None seen 0 - 2 /hpf    Epithelial Cells (non renal) None seen 0 - 10 /hpf    Casts None seen None seen /lpf    Bacteria, Urine None seen None seen/Few   Lipid Panel with Direct LDL reflex   Result Value Ref Range    Cholesterol, Total 161 100 - 199 mg/dL    Triglycerides 124 0 - 149 mg/dL    HDL 40 >39 mg/dL    VLDL Cholesterol Calculated 22 5 - 40 mg/dL    LDL Calculated 99 0 - 99 mg/dL   Microalbumin / creatinine urine ratio   Result Value Ref Range    Creatinine, Urine 83 6 Not Estab  mg/dL    Microalbum  ,U,Random 666 9 Not Estab  ug/mL    Microalb/Creat Ratio 798 (H) 0 - 29 mg/g creat   Vitamin B12/Folate, Serum Panel   Result Value Ref Range    Vitamin B-12 858 232 - 1,245 pg/mL    FOLATE, SERUM >20 0 >3 0 ng/mL   Methylmalonic acid, serum   Result Value Ref Range    Methylmalonic Acid, S 177 0 - 378 nmol/L    Disclaimer: Comment    Hemoglobin A1c (w/out EAG)   Result Value Ref Range    Hemoglobin A1C 6 5 (H) 4 8 - 5 6 %   TSH, 3rd generation with Free T4 reflex   Result Value Ref Range    TSH 5 490 (H) 0 450 - 4 500 uIU/mL   T4, free   Result Value Ref Range    T4,Free (Direct) 1 25 0 82 - 1 77 ng/dL   Gamma GT   Result Value Ref Range    GGT 45 0 - 65 IU/L   Sedimentation rate, automated   Result Value Ref Range    Sedimentation Rate 27 0 - 30 mm/hr   Magnesium   Result Value Ref Range    Magnesium, Serum 1 6 1 6 - 2 3 mg/dL   C-reactive protein   Result Value Ref Range    C-Reactive Protein, Quant 4 0 - 10 mg/L     No problem-specific Assessment & Plan notes found for this encounter  Diagnoses and all orders for this visit:    Type II diabetes mellitus with peripheral circulatory disorder ([facility] )    Subclinical hypothyroidism    Per[facility] )    Essential hypertension    AAA (abdominal aortic aneurysm) without rupture (HCC)    Vitamin D deficiency    Pure hypercholesterolemia    Polymyalgia rheumatica (HCC)          Subjective:      Patient ID: Bernice Hammond is a 81 y o  male  No chief complaint on file          Current Outpatient Medications:     acetaminophen (TYLENOL) 325 mg tablet, Take 2 tablets (650 mg total) by mouth every 6 (six) hours as needed for mild pain, Disp: , Rfl:     amLODIPine (NORVASC) 10 mg tablet, Take 1 tablet (10 mg total) by mouth daily, Disp: 30 tablet, Rfl: 2    Ascorbic Acid (VITAMIN C PO), Take 1,000 mg by mouth daily, Disp: , Rfl:     aspirin 81 MG tablet, Take 1 tablet by mouth daily, Disp: , Rfl:     atorvastatin (LIPITOR) 40 mg tablet, take 1 tablet (40MG TOTAL) by mouth once daily, Disp: 90 tablet, Rfl: 0    bisoprolol (ZEBETA) 5 mg tablet, take 1 tablet by mouth once daily, Disp: 90 tablet, Rfl: 0    Blood Pressure KIT, by Does not apply route 2 (two) times a week, Disp: 1 each, Rfl: 0   Cholecalciferol (Vitamin D3) 25 MCG (1000 UT) CAPS, TAKE 1 CAPSULE ON MON WED  AND FRI , Disp: 30 capsule, Rfl: 2    clotrimazole-betamethasone (LOTRISONE) 1-0 05 % cream, apply topically to affected area twice a day, Disp: 30 g, Rfl: 1    FeroSul 325 (65 Fe) MG tablet, TAKE 1 TABLET  ON MON WED  AND FRI , Disp: 12 tablet, Rfl: 1    folic acid (FOLVITE) 1 mg tablet, take 1 tablet by mouth once daily, Disp: 90 tablet, Rfl: 1    glucose blood (TRUETRACK TEST) test strip, by In Vitro route 2 (two) times a day, Disp: , Rfl:     indapamide (LOZOL) 2 5 mg tablet, TAKE 1 TABLET (2 5 MG TOTAL) BY MOUTH DAILY, Disp: 90 tablet, Rfl: 0    ketoconazole (NIZORAL) 2 % cream, Apply topically daily, Disp: 15 g, Rfl: 0    Lysine HCl 1000 MG TABS, Take 1,000 mg by mouth daily , Disp: , Rfl:     magnesium chloride-calcium (MAG-SR PLUS CALCIUM)  mg, Take 2 tablets by mouth daily, Disp: , Rfl:     multivitamin-minerals (CENTRUM ADULTS) tablet, Take 1 tablet by mouth daily, Disp: , Rfl:     omeprazole (PriLOSEC) 40 MG capsule, take 1 capsule by mouth once daily, Disp: 90 capsule, Rfl: 0    predniSONE 1 MG TBEC, 1 mg daily, Disp: , Rfl:     ramipril (ALTACE) 10 MG capsule, TAKE 1 CAPSULE (10 MG TOTAL) BY MOUTH DAILY, Disp: 90 capsule, Rfl: 1    tamsulosin (FLOMAX) 0 4 mg, take 1 capsule by mouth once daily, Disp: 30 capsule, Rfl: 4    HPI    The following portions of the patient's history were reviewed and updated as appropriate: allergies, current medications, past family history, past medical history, past social history, past surgical history and problem list     Review of Systems   Constitutional: Negative  Negative for activity change, appetite change, fatigue, fever and unexpected weight change  HENT: Negative for congestion, ear pain, hearing loss, mouth sores, postnasal drip, rhinorrhea, sore throat, trouble swallowing and voice change  Eyes: Negative for pain, redness and visual disturbance  Respiratory: Negative for cough, chest tightness, shortness of breath and wheezing  Cardiovascular: Negative for chest pain, palpitations and leg swelling  Gastrointestinal: Negative for abdominal distention, abdominal pain, blood in stool, constipation, diarrhea and nausea  Endocrine: Negative for cold intolerance, heat intolerance, polydipsia, polyphagia and polyuria  Genitourinary: Negative for difficulty urinating, dysuria, flank pain, frequency, hematuria and urgency  Musculoskeletal: Positive for myalgias  Negative for arthralgias, back pain, gait problem and joint swelling  PAIN IN THE CERVICAL SPINE AND THE LEFT SHOULDER AND ARM PAIN   Skin: Negative for color change and pallor  Neurological: Negative for dizziness, tremors, seizures, syncope, weakness, numbness and headaches  Hematological: Negative for adenopathy  Does not bruise/bleed easily  Psychiatric/Behavioral: Negative  Negative for sleep disturbance  The patient is not nervous/anxious  Objective:    Patient's shoes and socks removed  Right Foot/Ankle   Right Foot Inspection  Skin Exam: skin normal skin not intact, no dry skin, no warmth, no callus, no erythema, no maceration, no abnormal color, no pre-ulcer, no ulcer and no callus                          Toe Exam: ROM and strength within normal limitsno swelling, no tenderness, erythema and  no right toe deformity  Sensory   Vibration: diminished  Proprioception: intact   Monofilament testing: intact  Vascular    The right DP pulse is 1+       Left Foot/Ankle  Left Foot Inspection  Skin Exam: skin normalskin not intact, no dry skin, no warmth, no erythema, no maceration, normal color, no pre-ulcer, no ulcer and no callus                         Toe Exam: ROM and strength within normal limitsno swelling, no tenderness, no erythema and no left toe deformity                   Sensory   Vibration: diminished  Proprioception: intact  Monofilament: intact  Vascular    The left DP pulse is 1+  Assign Risk Category:  No deformity present; No loss of protective sensation; No weak pulses       Risk: 1        There were no vitals taken for this visit  Physical Exam  Vitals and nursing note reviewed  Constitutional:       Appearance: He is well-developed  HENT:      Head: Normocephalic  Right Ear: External ear normal       Left Ear: External ear normal       Nose: Nose normal       Mouth/Throat:      Pharynx: No oropharyngeal exudate  Eyes:      Conjunctiva/sclera: Conjunctivae normal       Pupils: Pupils are equal, round, and reactive to light  Neck:      Thyroid: No thyromegaly  Cardiovascular:      Rate and Rhythm: Normal rate and regular rhythm  Pulses: no weak pulses          Dorsalis pedis pulses are 1+ on the right side and 1+ on the left side  Heart sounds: Normal heart sounds  No murmur heard  No friction rub  No gallop  Comments: S1-S2 REGULAR RHYTHM  EXTREMITY +1 EDEMA  BLOOD PRESSURE 150/81 48/80  ON THE RIGHT AND LEFT ARM  Pulmonary:      Effort: Pulmonary effort is normal  No respiratory distress  Breath sounds: Normal breath sounds  No wheezing or rales  Comments: LUNGS ARE CLEAR NO WHEEZING RALES OR RHONCHI  Abdominal:      General: Bowel sounds are normal  There is no distension  Palpations: Abdomen is soft  There is no mass  Tenderness: There is no abdominal tenderness  There is no guarding or rebound  Musculoskeletal:         General: Tenderness present  Normal range of motion  Cervical back: Normal range of motion and neck supple  Feet:    Feet:      Right foot:      Skin integrity: No ulcer, skin breakdown, erythema, warmth, callus or dry skin  Left foot:      Skin integrity: No ulcer, skin breakdown, erythema, warmth, callus or dry skin  Lymphadenopathy:      Cervical: No cervical adenopathy  Skin:     General: Skin is warm and dry     Neurological:      Mental Status: He is alert and oriented to person, place, and time  Sensory: No sensory deficit  Deep Tendon Reflexes: Reflexes abnormal       Reflex Scores:       Tricep reflexes are 0 on the right side and 0 on the left side  Bicep reflexes are 0 on the right side and 0 on the left side  Brachioradialis reflexes are 0 on the right side and 0 on the left side    Psychiatric:         Behavior: Behavior normal          Judgment: Judgment normal

## 2021-07-08 NOTE — PATIENT INSTRUCTIONS
BLOOD PRESSURE BETTER CONTROL CONTINUE THE SAME PLEASE MONITOR YOUR BLOOD PRESSURE AT HOME AND BRING THE READINGS WHEN YOU COME BACK FOR YOUR FOLLOW-UP  WILL CALL YOU WITH YOUR MRI OF THE C-SPINE  GET HER EMG DONE THAT HAS BEEN SCHEDULE

## 2021-07-14 ENCOUNTER — TELEPHONE (OUTPATIENT)
Dept: INTERNAL MEDICINE CLINIC | Facility: CLINIC | Age: 86
End: 2021-07-14

## 2021-07-14 DIAGNOSIS — M47.12 OSTEOARTHRITIS OF CERVICAL SPINE WITH MYELOPATHY: Primary | ICD-10-CM

## 2021-07-14 DIAGNOSIS — R93.7 ABNORMAL MRI, CERVICAL SPINE: Primary | ICD-10-CM

## 2021-07-14 NOTE — TELEPHONE ENCOUNTER
----- Message from Abbi Aguila MD sent at 7/14/2021  9:21 AM EDT -----  Please call the patient regarding his abnormal result  MRI C spine  cord  compression  C5-6  needs  spine  consult neurosurgery

## 2021-07-14 NOTE — TELEPHONE ENCOUNTER
I spoke to Gregory Ayoub and gave results  I will order a consult for neuruosurgery  If Gregory Ayoub does not her from them by Friday, he will let me know

## 2021-08-03 ENCOUNTER — CONSULT (OUTPATIENT)
Dept: NEUROSURGERY | Facility: CLINIC | Age: 86
End: 2021-08-03
Payer: COMMERCIAL

## 2021-08-03 VITALS
SYSTOLIC BLOOD PRESSURE: 156 MMHG | HEIGHT: 66 IN | TEMPERATURE: 97.5 F | RESPIRATION RATE: 16 BRPM | DIASTOLIC BLOOD PRESSURE: 70 MMHG | BODY MASS INDEX: 32.47 KG/M2 | WEIGHT: 202 LBS

## 2021-08-03 DIAGNOSIS — R20.0 LEFT ARM NUMBNESS: Primary | ICD-10-CM

## 2021-08-03 DIAGNOSIS — M47.12 OSTEOARTHRITIS OF CERVICAL SPINE WITH MYELOPATHY: ICD-10-CM

## 2021-08-03 PROCEDURE — 3077F SYST BP >= 140 MM HG: CPT | Performed by: PHYSICIAN ASSISTANT

## 2021-08-03 PROCEDURE — 99204 OFFICE O/P NEW MOD 45 MIN: CPT | Performed by: PHYSICIAN ASSISTANT

## 2021-08-03 PROCEDURE — 3078F DIAST BP <80 MM HG: CPT | Performed by: PHYSICIAN ASSISTANT

## 2021-08-03 PROCEDURE — 1036F TOBACCO NON-USER: CPT | Performed by: PHYSICIAN ASSISTANT

## 2021-08-03 NOTE — PATIENT INSTRUCTIONS
Patient is to return to the office in 3 months for clinical follow-up or sooner should he develop worsening symptoms or red flag signs  Take fall precautions

## 2021-08-03 NOTE — ASSESSMENT & PLAN NOTE
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 compression with cord signal abnormality at C5-6  Signal abnormality may represent cord edema or myelomalacia and consultation with spinal surgery service is recommended  Moderate foraminal narrowing bilaterally at this level  Spondylotic degenerative changes are seen at remaining levels resulting in mild multilevel central and foraminal narrowing  There is mild to moderate bilateral foraminal narrowing at C6-7  Plan:   Exam:  GCS 15, strength and sensation intact at this time, no brisk reflexes / das or clonus noted, gait WNL   Reviewed imaging results with patient  He does demonstrate 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  Imaging results appear to be worse than clinical examination  Discussed options including monitoring versus surgical intervention  Surgery would be used to prevent further damage to spinal cord / reduce risk of devastating SCI in the setting of trauma  His clinical exam is not concerning at this time as he only has occasional numbness in his left arm and patient is not overly interested in surgery at this time given his age  We have opted for close monitoring at this time     Patient is to return to the office in 3 months for clinical follow up with AP and spine MD or sooner should patient develop worsening symptoms or red flag signs

## 2021-08-03 NOTE — PROGRESS NOTES
Neurosurgery Office Note  Quoc Mack 81 y o  male MRN: [id number]      Assessment/Plan     Left arm numbness  Patient presents as a new consult at the request of PCP Dr Flynn Plasencia for the evaluation of left arm numbness  · History of PMR on steroids, cardiac history on asa    Imaging:   X-ray spine cervical complete 06/03/2021:  Stable severe degenerative changes mentioned   MRI cervical spine without contrast 07/07/2021:  Spondylotic degenerative changes result in near cord compression with cord signal abnormality at C5-6  Signal abnormality may represent cord edema or myelomalacia and consultation with spinal surgery service is recommended  Moderate foraminal narrowing bilaterally at this level  Spondylotic degenerative changes are seen at remaining levels resulting in mild multilevel central and foraminal narrowing  There is mild to moderate bilateral foraminal narrowing at C6-7  Plan:   Exam:  GCS 15, strength and sensation intact at this time, no brisk reflexes / das or clonus noted, gait WNL   Reviewed imaging results with patient  He does demonstrate 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  Imaging results appear to be worse than clinical examination  Discussed options including monitoring versus surgical intervention  Surgery would be used to prevent further damage to spinal cord / reduce risk of devastating SCI in the setting of trauma  His clinical exam is not concerning at this time as he only has occasional numbness in his left arm and patient is not overly interested in surgery at this time given his age  We have opted for close monitoring at this time     Patient is to return to the office in 3 months for clinical follow up with [facility] and spine MD or sooner should patient develop worsening symptoms or red flag signs          CHIEF COMPLAINT    Chief Complaint   Patient presents with   Zehra Alcantara Consult       HISTORY    History of Present Illness     81y o  year old male with past medical history significant for peripheral vascular disease, coronary artery disease on aspirin, hypertension, hyperlipidemia, polymyalgia rheumatic on steroids, prior carpal tunnel and ulnar nerve decompression who presents as a new consultation at the request of his PCP for the evaluation of left arm numbness  Patient states for the last 6 months he has developed left arm numbness  He states numbness is from his shoulders down to his hand on the left  He does not remember any precipitating event around that time that could have caused this  He states numbness is occasional, only really noticing it when he is standing up when urinating but makes a point to mention it is not when he is looking down at his phone or to read a book  Does at times have some pressure sensations in his forearm muscles on the left  He has some pain in his shoulders when abducting but attributes this to his PMR  Has some neck stiffness from arthritis  Has some swelling in his feet  Does have LBP for which he has undergone steroid shots for  Has no radicular symptoms in terms of pain in his arms, no weakness, gait instability, bowel / bladder issues, fine motor function trouble  No assistive devices  Lives at home with his wife  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  HPI    See Discussion    REVIEW OF SYSTEMS    Review of Systems   Constitutional: Negative  HENT: Positive for tinnitus  Eyes: Negative  Endocrine: Negative  Musculoskeletal: Positive for myalgias (left arm pain) and neck stiffness  Negative for back pain and gait problem  Allergic/Immunologic: Negative  Neurological: Positive for numbness (left arm numbness )  Negative for dizziness, tremors, seizures, weakness and light-headedness  Hematological: Negative  Psychiatric/Behavioral: Negative            Meds/Allergies     Current Outpatient Medications   Medication Sig Dispense Refill    amLODIPine (NORVASC) 10 mg tablet Take 1 tablet (10 mg total) by mouth daily 30 tablet 2    Ascorbic Acid (VITAMIN C PO) Take 1,000 mg by mouth daily      aspirin 81 MG tablet Take 1 tablet by mouth daily      atorvastatin (LIPITOR) 40 mg tablet take 1 tablet (40MG TOTAL) by mouth once daily 90 tablet 0    bisoprolol (ZEBETA) 5 mg tablet take 1 tablet by mouth once daily 90 tablet 0    Cholecalciferol (Vitamin D3) 25 MCG (1000 UT) CAPS TAKE 1 CAPSULE ON MON WED  AND FRI  30 capsule 2    FeroSul 325 (65 Fe) MG tablet TAKE 1 TABLET  ON MON WED  AND FRI   12 tablet 1    folic acid (FOLVITE) 1 mg tablet take 1 tablet by mouth once daily 90 tablet 1    Lysine HCl 1000 MG TABS Take 1,000 mg by mouth daily       magnesium chloride-calcium (MAG-SR PLUS CALCIUM)  mg Take 2 tablets by mouth daily      multivitamin-minerals (CENTRUM ADULTS) tablet Take 1 tablet by mouth daily      omeprazole (PriLOSEC) 40 MG capsule take 1 capsule by mouth once daily 90 capsule 0    predniSONE 1 MG TBEC 1 mg daily      ramipril (ALTACE) 10 MG capsule TAKE 1 CAPSULE (10 MG TOTAL) BY MOUTH DAILY 90 capsule 1    tamsulosin (FLOMAX) 0 4 mg take 1 capsule by mouth once daily 30 capsule 4    acetaminophen (TYLENOL) 325 mg tablet Take 2 tablets (650 mg total) by mouth every 6 (six) hours as needed for mild pain (Patient not taking: Reported on 8/3/2021)      Blood Pressure KIT by Does not apply route 2 (two) times a week 1 each 0    clotrimazole-betamethasone (LOTRISONE) 1-0 05 % cream apply topically to affected area twice a day (Patient not taking: Reported on 8/3/2021) 30 g 1    glucose blood (TRUETRACK TEST) test strip by In Vitro route 2 (two) times a day      indapamide (LOZOL) 2 5 mg tablet TAKE 1 TABLET (2 5 MG TOTAL) BY MOUTH DAILY 90 tablet 0    ketoconazole (NIZORAL) 2 % cream Apply topically daily 15 g 0     No current facility-administered medications for this visit  Allergies   Allergen Reactions    Sulfamethoxazole-Trimethoprim Nausea Only       PAST HISTORY    Past Medical History:   Diagnosis Date    Allergic rhinitis     resolved 12/15/2017    Anemia     resolved 12/15/2017    Arthritis     Cataract     resolved 12/15/2017    Coronary artery disease     Hearing problem     Heart attack ([facility] [address] )     Hiatal hernia     Hyperlipidemia     Hypertension     Impaired fasting glucose     resolved 12/15/2017    Numbness of leg     resolved 12/15/2017    Pneumonia 2019    Polyarthritis     resolved 12/15/2017    PONV (postoperative nausea and vomiting)     PVD (peripheral vascular disease) ([facility] [address] )        Past Surgical History:   Procedure Laterality Date    CARDIAC SURGERY      CATARACT EXTRACTION Bilateral     CORONARY ANGIOPLASTY WITH STENT PLACEMENT      IR EVAR      IR EVAR  2019    OTHER SURGICAL HISTORY      detatched bicep tendon    [place] EVASC RPR DPLMNT AORTO-AORTIC NDGFT N/A 2019    Procedure: REVISION EVAR WITH AORTIC EXTENSION CUFF X3 WITH 78P24dv GORE, RIGHT RENAL STENTING WITH 6X16mm iCASt;  Surgeon: Winnie Whitney MD;  Location: [facility];  Service: Vascular       Social History     Tobacco Use    Smoking status: Former Smoker     Packs/day: 3 00     Years: 30 00     Pack years: 90 00     Quit date:      Years since quittin 6    Smokeless tobacco: Never Used    Tobacco comment: quit around the s   Substance Use Topics    Alcohol use: Yes     Comment: soically    Drug use: Never       Family History   Problem Relation Age of Onset    Anemia Mother     No Known Problems Father          Above history personally reviewed  EXAM    Vitals:Blood pressure 156/70, temperature 97 5 °F (36 4 °C), temperature source Temporal, resp  rate 16, height 5' 6" (1 676 m), weight 91 6 kg (202 lb)  ,Body mass index is 32 6 kg/m²  Physical Exam  Constitutional:       General: He is awake  Appearance: Normal appearance  HENT:      Head: Normocephalic and atraumatic  Eyes:      Extraocular Movements: EOM normal       Conjunctiva/sclera: Conjunctivae normal    Neck:      Comments: Mild decreased ROM diffusely   Cardiovascular:      Rate and Rhythm: Normal rate  Pulmonary:      Effort: Pulmonary effort is normal  No respiratory distress  Skin:     General: Skin is warm and dry  Neurological:      Mental Status: He is alert and oriented to person, place, and time  Coordination: Finger-Nose-Finger Test normal       Gait: Gait is intact  Deep Tendon Reflexes:      Reflex Scores:       Bicep reflexes are 2+ on the right side and 2+ on the left side  Brachioradialis reflexes are 2+ on the right side and 2+ on the left side  Patellar reflexes are 1+ on the right side and 2+ on the left side  Psychiatric:         Attention and Perception: Attention and perception normal          Mood and Affect: Mood and affect normal          Speech: Speech normal          Behavior: Behavior normal  Behavior is cooperative  Thought Content: Thought content normal          Cognition and Memory: Cognition and memory normal          Judgment: Judgment normal          Neurologic Exam     Mental Status   Oriented to person, place, and time  Follows 1 step commands  Attention: normal  Concentration: normal    Speech: speech is normal   Level of consciousness: alert  Knowledge: good  Normal comprehension       Cranial Nerves     CN III, IV, VI   Extraocular motions are normal    CN III: no CN III palsy  CN VI: no CN VI palsy  Nystagmus: none   Diplopia: none  Ophthalmoparesis: none  Upgaze: normal  Downgaze: normal  Conjugate gaze: present    CN V   Right facial sensation deficit: none  Left facial sensation deficit: none    CN VII   Right facial weakness: none  Left facial weakness: none    CN VIII   Hearing: intact    CN IX, X   CN IX normal    CN X normal      CN XI   Right trapezius strength: normal  Left trapezius strength: normal    CN XII   CN XII normal      Motor Exam   Muscle bulk: normal  Overall muscle tone: normal  Right arm pronator drift: absent  Left arm pronator drift: absent    Strength   Strength 5/5 except as noted  LUE:  5-/5 deltoid     Sensory Exam   Light touch normal    Right arm proprioception: normal  Left arm proprioception: normal  Right arm pinprick: normal  Left arm pinprick: normal  DST intact     Gait, Coordination, and Reflexes     Gait  Gait: normal    Coordination   Finger to nose coordination: normal    Tremor   Resting tremor: absent  Intention tremor: absent  Action tremor: absent    Reflexes   Right brachioradialis: 2+  Left brachioradialis: 2+  Right biceps: 2+  Left biceps: 2+  Right patellar: 1+  Left patellar: 2+  Right : 2+  Left : 2+  Right Mott: absent  Left Mott: absent  Right ankle clonus: absent  Left ankle clonus: absent        MEDICAL DECISION MAKING    Imaging Studies:     MRI cervical spine wo contrast    Result Date: 7/13/2021  Narrative: MRI CERVICAL SPINE WITHOUT CONTRAST INDICATION: M79 602: Pain in left arm M54 12: Radiculopathy, cervical region R20 0: Anesthesia of skin  Cervical radiculopathy  Left arm numbness  COMPARISON:  None  TECHNIQUE:  Sagittal T1, sagittal T2, sagittal inversion recovery, axial T2, axial  2D merge IMAGE QUALITY:  Diagnostic FINDINGS: ALIGNMENT:  Normal alignment of the cervical spine  No compression fracture  No subluxation  No scoliosis  MARROW SIGNAL:  Normal marrow signal is identified within the visualized bony structures  No discrete marrow lesion  CERVICAL AND VISUALIZED THORACIC CORD:  Cord signal abnormality identified at C5-6 representing cord edema versus myelomalacia  PREVERTEBRAL AND PARASPINAL SOFT TISSUES:  Normal  VISUALIZED POSTERIOR FOSSA:  The visualized posterior fossa demonstrates no abnormal signal  CERVICAL DISC SPACES: C2-C3:  Moderate facet hypertrophy  No significant central or foraminal narrowing  C3-C4:  Degenerative disc osteophyte complex with marginal osteophytes and severe facet degenerative change combined to result in moderate right and mild left foraminal narrowing  Mild central stenosis  C4-C5:  Degenerative disc osteophyte complex with marginal osteophytes and facet hypertrophy result in mild central and mild bilateral foraminal narrowing  C5-C6:  Degenerative disc osteophyte complex with marginal osteophytes and facet hypertrophy result in moderate to severe central stenosis with cord flattening and signal abnormality in the cord representing either cord edema or myelomalacia  Spinal surgery consultation is recommended  Moderate bilateral foraminal narrowing is seen  The is the most severely affected level  C6-C7:  Degenerative disc osteophyte complex with marginal osteophytes results in mild to moderate bilateral foraminal narrowing  Central canal patent  C7-T1:  Moderate to severe facet degenerative change  No significant central or foraminal narrowing  UPPER THORACIC DISC SPACES:  T2-T3: No significant central or foraminal narrowing  Minimal annular bulge  Impression: Spondylotic degenerative changes result in near cord compression with cord signal abnormality at C5-6  Signal abnormality may represent cord edema or myelomalacia and consultation with spinal surgery service is recommended  Moderate foraminal narrowing  bilaterally at this level  Spondylotic degenerative changes are seen at remaining levels resulting in Mild multilevel central and foraminal narrowing  There is mild to moderate bilateral foraminal narrowing at C6-7  The study was marked in [facility] for immediate notification  Workstation performed: [id number]       I have personally reviewed pertinent reports     and I have personally reviewed pertinent films in PACS

## 2021-08-23 DIAGNOSIS — R35.0 BENIGN PROSTATIC HYPERPLASIA WITH URINARY FREQUENCY: ICD-10-CM

## 2021-08-23 DIAGNOSIS — N40.1 BENIGN PROSTATIC HYPERPLASIA WITH URINARY FREQUENCY: ICD-10-CM

## 2021-08-23 RX ORDER — TAMSULOSIN HYDROCHLORIDE 0.4 MG/1
CAPSULE ORAL
Qty: 30 CAPSULE | Refills: 4 | Status: SHIPPED | OUTPATIENT
Start: 2021-08-23 | End: 2022-02-07 | Stop reason: SDUPTHER

## 2021-09-02 LAB
ALBUMIN SERPL-MCNC: 4.4 G/DL (ref 3.6–4.6)
ALBUMIN/GLOB SERPL: 1.6 {RATIO} (ref 1.2–2.2)
ALP SERPL-CCNC: 79 IU/L (ref 48–121)
ALT SERPL-CCNC: 35 IU/L (ref 0–44)
AST SERPL-CCNC: 27 IU/L (ref 0–40)
BASOPHILS # BLD AUTO: 0.1 X10E3/UL (ref 0–0.2)
BASOPHILS NFR BLD AUTO: 1 %
BILIRUB SERPL-MCNC: 0.5 MG/DL (ref 0–1.2)
BUN SERPL-MCNC: 25 MG/DL (ref 8–27)
BUN/CREAT SERPL: 20 (ref 10–24)
CALCIUM SERPL-MCNC: 9.2 MG/DL (ref 8.6–10.2)
CHLORIDE SERPL-SCNC: 106 MMOL/L (ref 96–106)
CO2 SERPL-SCNC: 27 MMOL/L (ref 20–29)
CREAT SERPL-MCNC: 1.28 MG/DL (ref 0.76–1.27)
EOSINOPHIL # BLD AUTO: 0.3 X10E3/UL (ref 0–0.4)
EOSINOPHIL NFR BLD AUTO: 4 %
ERYTHROCYTE [DISTWIDTH] IN BLOOD BY AUTOMATED COUNT: 14.3 % (ref 11.6–15.4)
GLOBULIN SER-MCNC: 2.8 G/DL (ref 1.5–4.5)
GLUCOSE SERPL-MCNC: 99 MG/DL (ref 65–99)
HBA1C MFR BLD: 6.3 % (ref 4.8–5.6)
HCT VFR BLD AUTO: 38.6 % (ref 37.5–51)
HGB BLD-MCNC: 13.1 G/DL (ref 13–17.7)
IMM GRANULOCYTES # BLD: 0 X10E3/UL (ref 0–0.1)
IMM GRANULOCYTES NFR BLD: 1 %
LYMPHOCYTES # BLD AUTO: 1.3 X10E3/UL (ref 0.7–3.1)
LYMPHOCYTES NFR BLD AUTO: 22 %
MAGNESIUM SERPL-MCNC: 1.7 MG/DL (ref 1.6–2.3)
MCH RBC QN AUTO: 30 PG (ref 26.6–33)
MCHC RBC AUTO-ENTMCNC: 33.9 G/DL (ref 31.5–35.7)
MCV RBC AUTO: 88 FL (ref 79–97)
MONOCYTES # BLD AUTO: 0.8 X10E3/UL (ref 0.1–0.9)
MONOCYTES NFR BLD AUTO: 14 %
NEUTROPHILS # BLD AUTO: 3.4 X10E3/UL (ref 1.4–7)
NEUTROPHILS NFR BLD AUTO: 58 %
PLATELET # BLD AUTO: 198 X10E3/UL (ref 150–450)
POTASSIUM SERPL-SCNC: 5.3 MMOL/L (ref 3.5–5.2)
PROT SERPL-MCNC: 7.2 G/DL (ref 6–8.5)
RBC # BLD AUTO: 4.37 X10E6/UL (ref 4.14–5.8)
SL AMB EGFR AFRICAN AMERICAN: 59 ML/MIN/1.73
SL AMB EGFR NON AFRICAN AMERICAN: 51 ML/MIN/1.73
SODIUM SERPL-SCNC: 146 MMOL/L (ref 134–144)
T4 FREE SERPL-MCNC: 1.2 NG/DL (ref 0.82–1.77)
TSH SERPL DL<=0.005 MIU/L-ACNC: 3.99 UIU/ML (ref 0.45–4.5)
WBC # BLD AUTO: 5.8 X10E3/UL (ref 3.4–10.8)

## 2021-09-09 ENCOUNTER — OFFICE VISIT (OUTPATIENT)
Dept: INTERNAL MEDICINE CLINIC | Facility: CLINIC | Age: 86
End: 2021-09-09
Payer: COMMERCIAL

## 2021-09-09 VITALS
RESPIRATION RATE: 12 BRPM | HEIGHT: 60 IN | TEMPERATURE: 97.6 F | HEART RATE: 84 BPM | SYSTOLIC BLOOD PRESSURE: 142 MMHG | BODY MASS INDEX: 39.66 KG/M2 | DIASTOLIC BLOOD PRESSURE: 80 MMHG | WEIGHT: 202 LBS

## 2021-09-09 DIAGNOSIS — I71.4 AAA (ABDOMINAL AORTIC ANEURYSM) WITHOUT RUPTURE (HCC): Chronic | ICD-10-CM

## 2021-09-09 DIAGNOSIS — M35.3 POLYMYALGIA RHEUMATICA (HCC): ICD-10-CM

## 2021-09-09 DIAGNOSIS — E03.8 SUBCLINICAL HYPOTHYROIDISM: ICD-10-CM

## 2021-09-09 DIAGNOSIS — N18.30 STAGE 3 CHRONIC KIDNEY DISEASE, UNSPECIFIED WHETHER STAGE 3A OR 3B CKD (HCC): ICD-10-CM

## 2021-09-09 DIAGNOSIS — I10 ESSENTIAL HYPERTENSION: ICD-10-CM

## 2021-09-09 DIAGNOSIS — E11.51 TYPE II DIABETES MELLITUS WITH PERIPHERAL CIRCULATORY DISORDER (HCC): Primary | ICD-10-CM

## 2021-09-09 DIAGNOSIS — E78.00 PURE HYPERCHOLESTEROLEMIA: ICD-10-CM

## 2021-09-09 DIAGNOSIS — I73.9 PERIPHERAL VASCULAR DISEASE (HCC): ICD-10-CM

## 2021-09-09 PROCEDURE — 1160F RVW MEDS BY RX/DR IN RCRD: CPT | Performed by: INTERNAL MEDICINE

## 2021-09-09 PROCEDURE — 3079F DIAST BP 80-89 MM HG: CPT | Performed by: INTERNAL MEDICINE

## 2021-09-09 PROCEDURE — 99214 OFFICE O/P EST MOD 30 MIN: CPT | Performed by: INTERNAL MEDICINE

## 2021-09-09 PROCEDURE — 1036F TOBACCO NON-USER: CPT | Performed by: INTERNAL MEDICINE

## 2021-09-09 PROCEDURE — 3077F SYST BP >= 140 MM HG: CPT | Performed by: INTERNAL MEDICINE

## 2021-09-09 NOTE — PATIENT INSTRUCTIONS
Follow-up with neurosurgeon  Get her ENG done  Will order an ultrasound of the aorta to check on your aneurysm and also an ultrasound of your lower extremities to check on her peripheral vascular disease  Your labs are excellent diabetes well control monitor your blood pressure at home and bring the piece of paper with the next office visit

## 2021-09-09 NOTE — PROGRESS NOTES
Assessment/Plan:  No change in medication order an arterial Doppler and an ultrasound of the aorta bending neurosurgeon consultation and an EMG      Cervical myelopathy  Documented on MRI of the cervical spine has seen the neurosurgeon he has a follow-up with the neurosurgeon Dr Giovana Benites  in a few weeks  EMG is pending procedure order    Blood pressure is well control show me a reading from blood pressure at home which is excellent  Did not make any medication changes during this visit he is on the following  Indapamide 2 5 mg  Ramipril 10 mg  Amlodipine 10 mg  By systolic 5 mg    No change in medication or plan    Hyperlipidemia on Lipitor 40 mg per day will continue the same will check a lipid profile with the next labs  Polymyalgia rheumatica follows up with Rheumatology is on prednisone 2 mg per day  BPH he is on Flomax 0 4 mg no change in medication or plan    Diabetes is excellent control hemoglobin A1c is less than 7 no polyuria polydipsia    Anemia resolved by iron replacement  History abdominal aortic aneurysm peripheral vascular disease ultrasound studies were review from 2020 will repeat them for comparison this year      Results for orders placed or performed in visit on 09/01/21   TSH WITH REFLEX TO FREE T4   Result Value Ref Range    TSH 3 990 0 450 - 4 500 uIU/mL    Free t4 1 20 0 82 - 1 77 ng/dL   Ambig Abbrev Default   Result Value Ref Range    White Blood Cell Count 5 8 3 4 - 10 8 x10E3/uL    Red Blood Cell Count 4 37 4 14 - 5 80 x10E6/uL    Hemoglobin 13 1 13 0 - 17 7 g/dL    HCT 38 6 37 5 - 51 0 %    MCV 88 79 - 97 fL    MCH 30 0 26 6 - 33 0 pg    MCHC 33 9 31 5 - 35 7 g/dL    RDW 14 3 11 6 - 15 4 %    Platelet Count 797 394 - 450 x10E3/uL    Neutrophils 58 Not Estab  %    Lymphocytes 22 Not Estab  %    Monocytes 14 Not Estab  %    Eosinophils 4 Not Estab  %    Basophils PCT 1 Not Estab  %    Neutrophils (Absolute) 3 4 1 4 - 7 0 x10E3/uL    Lymphocytes (Absolute) 1 3 0 7 - 3 1 x10E3/uL Monocytes (Absolute) 0 8 0 1 - 0 9 x10E3/uL    Eosinophils (Absolute) 0 3 0 0 - 0 4 x10E3/uL    Basophils ABS 0 1 0 0 - 0 2 x10E3/uL    Immature Granulocytes 1 Not Estab  %    Immature Granulocytes (Absolute) 0 0 0 0 - 0 1 x10E3/uL   Comprehensive metabolic panel   Result Value Ref Range    Glucose, Random 99 65 - 99 mg/dL    BUN 25 8 - 27 mg/dL    Creatinine 1 28 (H) 0 76 - 1 27 mg/dL    eGFR Non  51 (L) >59 mL/min/1 73    eGFR  59 (L) >59 mL/min/1 73    SL AMB BUN/CREATININE RATIO 20 10 - 24    Sodium 146 (H) 134 - 144 mmol/L    Potassium 5 3 (H) 3 5 - 5 2 mmol/L    Chloride 106 96 - 106 mmol/L    CO2 27 20 - 29 mmol/L    CALCIUM 9 2 8 6 - 10 2 mg/dL    Protein, Total 7 2 6 0 - 8 5 g/dL    Albumin 4 4 3 6 - 4 6 g/dL    Globulin, Total 2 8 1 5 - 4 5 g/dL    Albumin/Globulin Ratio 1 6 1 2 - 2 2    TOTAL BILIRUBIN 0 5 0 0 - 1 2 mg/dL    Alk Phos Isoenzymes 79 48 - 121 IU/L    AST 27 0 - 40 IU/L    ALT 35 0 - 44 IU/L   Hemoglobin A1c (w/out EAG)   Result Value Ref Range    Hemoglobin A1C 6 3 (H) 4 8 - 5 6 %   Magnesium   Result Value Ref Range    Magnesium, Serum 1 7 1 6 - 2 3 mg/dL         No problem-specific Assessment & Plan notes found for this encounter  Diagnoses and all orders for this visit:    Type II diabetes mellitus with peripheral circulatory disorder ([facility] [address] )    Essential hypertension    Subclinical hypothyroidism    Peripheral vascular disease (HCC)    Stage 3 chronic kidney disease, unspecified whether stage 3a or 3b CKD (HCC)    AAA (abdominal aortic aneurysm) without rupture ([facility] [address] )    Pure hypercholesterolemia    Polymyalgia rheumatica (HCC)          Subjective:      Patient ID: Blane Lira is a 81 y o  male  No chief complaint on file          Current Outpatient Medications:     acetaminophen (TYLENOL) 325 mg tablet, Take 2 tablets (650 mg total) by mouth every 6 (six) hours as needed for mild pain (Patient not taking: Reported on 8/3/2021), Disp: , Rfl:    amLODIPine (NORVASC) 10 mg tablet, Take 1 tablet (10 mg total) by mouth daily, Disp: 30 tablet, Rfl: 2    Ascorbic Acid (VITAMIN C PO), Take 1,000 mg by mouth daily, Disp: , Rfl:     aspirin 81 MG tablet, Take 1 tablet by mouth daily, Disp: , Rfl:     atorvastatin (LIPITOR) 40 mg tablet, take 1 tablet (40MG TOTAL) by mouth once daily, Disp: 90 tablet, Rfl: 0    bisoprolol (ZEBETA) 5 mg tablet, take 1 tablet by mouth once daily, Disp: 90 tablet, Rfl: 0    Blood Pressure KIT, by Does not apply route 2 (two) times a week, Disp: 1 each, Rfl: 0    Cholecalciferol (Vitamin D3) 25 MCG (1000 UT) CAPS, TAKE 1 CAPSULE ON MON WED  AND FRI , Disp: 30 capsule, Rfl: 2    clotrimazole-betamethasone (LOTRISONE) 1-0 05 % cream, apply topically to affected area twice a day (Patient not taking: Reported on 8/3/2021), Disp: 30 g, Rfl: 1    FeroSul 325 (65 Fe) MG tablet, TAKE 1 TABLET  ON MON WED   AND FRI , Disp: 12 tablet, Rfl: 1    folic acid (FOLVITE) 1 mg tablet, take 1 tablet by mouth once daily, Disp: 90 tablet, Rfl: 1    glucose blood (TRUETRACK TEST) test strip, by In Vitro route 2 (two) times a day, Disp: , Rfl:     indapamide (LOZOL) 2 5 mg tablet, TAKE 1 TABLET (2 5 MG TOTAL) BY MOUTH DAILY, Disp: 90 tablet, Rfl: 0    ketoconazole (NIZORAL) 2 % cream, Apply topically daily, Disp: 15 g, Rfl: 0    Lysine HCl 1000 MG TABS, Take 1,000 mg by mouth daily , Disp: , Rfl:     magnesium chloride-calcium (MAG-SR PLUS CALCIUM)  mg, Take 2 tablets by mouth daily, Disp: , Rfl:     multivitamin-minerals (CENTRUM ADULTS) tablet, Take 1 tablet by mouth daily, Disp: , Rfl:     omeprazole (PriLOSEC) 40 MG capsule, take 1 capsule by mouth once daily, Disp: 90 capsule, Rfl: 0    predniSONE 1 MG TBEC, 1 mg daily, Disp: , Rfl:     ramipril (ALTACE) 10 MG capsule, TAKE 1 CAPSULE (10 MG TOTAL) BY MOUTH DAILY, Disp: 90 capsule, Rfl: 1    tamsulosin (FLOMAX) 0 4 mg, take 1 capsule by mouth once daily, Disp: 30 capsule, Rfl: 4    HPI    The following portions of the patient's history were reviewed and updated as appropriate: allergies, current medications, past family history, past medical history, past social history, past surgical history and problem list     Review of Systems   Constitutional: Negative  Negative for activity change, appetite change, fatigue, fever and unexpected weight change  HENT: Negative for congestion, ear pain, hearing loss, mouth sores, postnasal drip, rhinorrhea, sore throat, trouble swallowing and voice change  Eyes: Negative for pain, redness and visual disturbance  Respiratory: Negative for cough, chest tightness, shortness of breath and wheezing  Cardiovascular: Negative for chest pain, palpitations and leg swelling  Gastrointestinal: Negative for abdominal distention, abdominal pain, blood in stool, constipation, diarrhea and nausea  Endocrine: Negative for cold intolerance, heat intolerance, polydipsia, polyphagia and polyuria  Genitourinary: Negative for difficulty urinating, dysuria, flank pain, frequency, hematuria and urgency  Musculoskeletal: Negative for arthralgias, back pain, gait problem, joint swelling and myalgias  Skin: Negative for color change and pallor  Neurological: Positive for numbness  Negative for dizziness, tremors, seizures, syncope, weakness and headaches  Left arm   Hematological: Negative for adenopathy  Does not bruise/bleed easily  Psychiatric/Behavioral: Negative  Negative for sleep disturbance  The patient is not nervous/anxious  Objective: There were no vitals taken for this visit  Physical Exam  Vitals and nursing note reviewed  Constitutional:       Appearance: He is well-developed  HENT:      Head: Normocephalic  Right Ear: External ear normal       Left Ear: External ear normal       Nose: Nose normal       Mouth/Throat:      Pharynx: No oropharyngeal exudate     Eyes:      Conjunctiva/sclera: Conjunctivae normal       Pupils: Pupils are equal, round, and reactive to light  Neck:      Thyroid: No thyromegaly  Cardiovascular:      Rate and Rhythm: Normal rate and regular rhythm  Heart sounds: Normal heart sounds  No murmur heard  No friction rub  No gallop  Comments: S1-S2 regular rhythm  Extremities no edema  Pulmonary:      Effort: Pulmonary effort is normal  No respiratory distress  Breath sounds: Normal breath sounds  No wheezing or rales  Comments: Lungs are clear no wheezing rales or rhonchi  Abdominal:      General: Bowel sounds are normal  There is no distension  Palpations: Abdomen is soft  There is no mass  Tenderness: There is no abdominal tenderness  There is no guarding or rebound  Musculoskeletal:         General: Normal range of motion  Cervical back: Normal range of motion and neck supple  Lymphadenopathy:      Cervical: No cervical adenopathy  Skin:     General: Skin is warm and dry  Neurological:      Mental Status: He is alert and oriented to person, place, and time     Psychiatric:         Behavior: Behavior normal          Judgment: Judgment normal

## 2021-09-14 ENCOUNTER — HOSPITAL ENCOUNTER (OUTPATIENT)
Dept: NON INVASIVE DIAGNOSTICS | Facility: HOSPITAL | Age: 86
Discharge: HOME | End: 2021-09-14
Attending: INTERNAL MEDICINE
Payer: COMMERCIAL

## 2021-09-14 DIAGNOSIS — I71.4 AAA (ABDOMINAL AORTIC ANEURYSM) WITHOUT RUPTURE (HCC): Chronic | ICD-10-CM

## 2021-09-14 DIAGNOSIS — N18.30 STAGE 3 CHRONIC KIDNEY DISEASE, UNSPECIFIED WHETHER STAGE 3A OR 3B CKD (HCC): ICD-10-CM

## 2021-09-14 DIAGNOSIS — E11.51 TYPE II DIABETES MELLITUS WITH PERIPHERAL CIRCULATORY DISORDER (HCC): ICD-10-CM

## 2021-09-14 DIAGNOSIS — I73.9 PERIPHERAL VASCULAR DISEASE (HCC): ICD-10-CM

## 2021-09-14 DIAGNOSIS — I10 ESSENTIAL HYPERTENSION: ICD-10-CM

## 2021-09-14 PROCEDURE — 93978 VASCULAR STUDY: CPT | Performed by: SURGERY

## 2021-09-14 PROCEDURE — 93978 VASCULAR STUDY: CPT

## 2021-09-14 PROCEDURE — 93923 UPR/LXTR ART STDY 3+ LVLS: CPT

## 2021-09-15 DIAGNOSIS — E61.1 LOW IRON: ICD-10-CM

## 2021-09-15 RX ORDER — FERROUS SULFATE 325(65) MG
TABLET ORAL
Qty: 12 TABLET | Refills: 4 | Status: SHIPPED | OUTPATIENT
Start: 2021-09-15 | End: 2022-03-14 | Stop reason: SDUPTHER

## 2021-09-17 ENCOUNTER — TELEPHONE (OUTPATIENT)
Dept: INTERNAL MEDICINE CLINIC | Facility: CLINIC | Age: 86
End: 2021-09-17

## 2021-09-28 DIAGNOSIS — E78.00 PURE HYPERCHOLESTEROLEMIA: ICD-10-CM

## 2021-09-28 DIAGNOSIS — I10 ESSENTIAL HYPERTENSION: ICD-10-CM

## 2021-09-28 DIAGNOSIS — K21.9 GASTROESOPHAGEAL REFLUX DISEASE WITHOUT ESOPHAGITIS: ICD-10-CM

## 2021-09-28 RX ORDER — AMLODIPINE BESYLATE 10 MG/1
10 TABLET ORAL DAILY
Qty: 90 TABLET | Refills: 1 | Status: SHIPPED | OUTPATIENT
Start: 2021-09-28 | End: 2022-04-04 | Stop reason: SDUPTHER

## 2021-09-28 RX ORDER — INDAPAMIDE 2.5 MG/1
2.5 TABLET, FILM COATED ORAL DAILY
Qty: 90 TABLET | Refills: 1 | Status: SHIPPED | OUTPATIENT
Start: 2021-09-28 | End: 2022-04-04 | Stop reason: SDUPTHER

## 2021-09-28 RX ORDER — ATORVASTATIN CALCIUM 40 MG/1
TABLET, FILM COATED ORAL
Qty: 90 TABLET | Refills: 1 | Status: SHIPPED | OUTPATIENT
Start: 2021-09-28 | End: 2022-02-07 | Stop reason: SDUPTHER

## 2021-09-28 RX ORDER — BISOPROLOL FUMARATE 5 MG/1
5 TABLET ORAL DAILY
Qty: 90 TABLET | Refills: 1 | Status: SHIPPED | OUTPATIENT
Start: 2021-09-28 | End: 2022-05-09 | Stop reason: SDUPTHER

## 2021-09-28 RX ORDER — OMEPRAZOLE 40 MG/1
40 CAPSULE, DELAYED RELEASE ORAL DAILY
Qty: 90 CAPSULE | Refills: 1 | Status: SHIPPED | OUTPATIENT
Start: 2021-09-28 | End: 2022-04-04 | Stop reason: SDUPTHER

## 2021-10-24 DIAGNOSIS — K21.9 GASTROESOPHAGEAL REFLUX DISEASE WITHOUT ESOPHAGITIS: ICD-10-CM

## 2021-10-24 DIAGNOSIS — E55.9 VITAMIN D DEFICIENCY: ICD-10-CM

## 2021-10-25 RX ORDER — BIOTIN 1 MG
TABLET ORAL
Qty: 30 CAPSULE | Refills: 2 | Status: SHIPPED | OUTPATIENT
Start: 2021-10-25 | End: 2022-07-13 | Stop reason: SDUPTHER

## 2021-10-25 RX ORDER — FOLIC ACID 1 MG/1
TABLET ORAL
Qty: 90 TABLET | Refills: 1 | Status: SHIPPED | OUTPATIENT
Start: 2021-10-25 | End: 2022-05-09 | Stop reason: SDUPTHER

## 2021-11-05 ENCOUNTER — OFFICE VISIT (OUTPATIENT)
Dept: NEUROSURGERY | Facility: CLINIC | Age: 86
End: 2021-11-05
Payer: COMMERCIAL

## 2021-11-05 VITALS
WEIGHT: 200 LBS | TEMPERATURE: 98.1 F | RESPIRATION RATE: 16 BRPM | BODY MASS INDEX: 32.14 KG/M2 | HEART RATE: 73 BPM | DIASTOLIC BLOOD PRESSURE: 72 MMHG | SYSTOLIC BLOOD PRESSURE: 151 MMHG | HEIGHT: 66 IN

## 2021-11-05 DIAGNOSIS — M47.22 OSTEOARTHRITIS OF SPINE WITH RADICULOPATHY, CERVICAL REGION: ICD-10-CM

## 2021-11-05 DIAGNOSIS — M48.02 CERVICAL SPINAL STENOSIS: Primary | ICD-10-CM

## 2021-11-05 PROCEDURE — 99213 OFFICE O/P EST LOW 20 MIN: CPT | Performed by: NEUROLOGICAL SURGERY

## 2021-11-05 PROCEDURE — 3078F DIAST BP <80 MM HG: CPT | Performed by: NEUROLOGICAL SURGERY

## 2021-11-05 PROCEDURE — 1036F TOBACCO NON-USER: CPT | Performed by: NEUROLOGICAL SURGERY

## 2021-11-05 PROCEDURE — 1160F RVW MEDS BY RX/DR IN RCRD: CPT | Performed by: NEUROLOGICAL SURGERY

## 2021-11-05 PROCEDURE — 3077F SYST BP >= 140 MM HG: CPT | Performed by: NEUROLOGICAL SURGERY

## 2021-11-24 ENCOUNTER — HOSPITAL ENCOUNTER (OUTPATIENT)
Dept: NEUROLOGY | Facility: CLINIC | Age: 86
Discharge: HOME | End: 2021-11-24
Payer: COMMERCIAL

## 2021-11-24 DIAGNOSIS — R20.0 NUMBNESS AND TINGLING IN LEFT ARM: ICD-10-CM

## 2021-11-24 DIAGNOSIS — R20.2 NUMBNESS AND TINGLING IN LEFT ARM: ICD-10-CM

## 2021-11-24 PROCEDURE — 95886 MUSC TEST DONE W/N TEST COMP: CPT | Performed by: PSYCHIATRY & NEUROLOGY

## 2021-11-24 PROCEDURE — 95909 NRV CNDJ TST 5-6 STUDIES: CPT | Performed by: PSYCHIATRY & NEUROLOGY

## 2021-11-30 ENCOUNTER — TELEPHONE (OUTPATIENT)
Dept: INTERNAL MEDICINE CLINIC | Facility: CLINIC | Age: 86
End: 2021-11-30

## 2021-12-07 LAB
ALBUMIN SERPL-MCNC: 4.1 G/DL (ref 3.6–4.6)
ALBUMIN/CREAT UR: 332 MG/G CREAT (ref 0–29)
ALBUMIN/GLOB SERPL: 1.5 {RATIO} (ref 1.2–2.2)
ALP SERPL-CCNC: 84 IU/L (ref 44–121)
ALT SERPL-CCNC: 32 IU/L (ref 0–44)
APPEARANCE UR: CLEAR
AST SERPL-CCNC: 23 IU/L (ref 0–40)
BACTERIA URNS QL MICRO: NORMAL
BASOPHILS # BLD AUTO: 0.1 X10E3/UL (ref 0–0.2)
BASOPHILS NFR BLD AUTO: 1 %
BILIRUB SERPL-MCNC: 0.7 MG/DL (ref 0–1.2)
BILIRUB UR QL STRIP: NEGATIVE
BUN SERPL-MCNC: 31 MG/DL (ref 8–27)
BUN/CREAT SERPL: 23 (ref 10–24)
CALCIUM SERPL-MCNC: 9 MG/DL (ref 8.6–10.2)
CASTS URNS QL MICRO: NORMAL /LPF
CHLORIDE SERPL-SCNC: 101 MMOL/L (ref 96–106)
CHOLEST SERPL-MCNC: 194 MG/DL (ref 100–199)
CO2 SERPL-SCNC: 23 MMOL/L (ref 20–29)
COLOR UR: YELLOW
CREAT SERPL-MCNC: 1.35 MG/DL (ref 0.76–1.27)
CREAT UR-MCNC: 92.5 MG/DL
CRP SERPL-MCNC: 9 MG/L (ref 0–10)
EOSINOPHIL # BLD AUTO: 0.6 X10E3/UL (ref 0–0.4)
EOSINOPHIL NFR BLD AUTO: 8 %
EPI CELLS #/AREA URNS HPF: NORMAL /HPF (ref 0–10)
ERYTHROCYTE [DISTWIDTH] IN BLOOD BY AUTOMATED COUNT: 14.5 % (ref 11.6–15.4)
ERYTHROCYTE [SEDIMENTATION RATE] IN BLOOD BY WESTERGREN METHOD: 12 MM/HR (ref 0–30)
GLOBULIN SER-MCNC: 2.7 G/DL (ref 1.5–4.5)
GLUCOSE SERPL-MCNC: 152 MG/DL (ref 65–99)
GLUCOSE UR QL: NEGATIVE
HBA1C MFR BLD: 6.5 % (ref 4.8–5.6)
HCT VFR BLD AUTO: 40.7 % (ref 37.5–51)
HDLC SERPL-MCNC: 50 MG/DL
HGB BLD-MCNC: 13.7 G/DL (ref 13–17.7)
HGB UR QL STRIP: NEGATIVE
IMM GRANULOCYTES # BLD: 0.1 X10E3/UL (ref 0–0.1)
IMM GRANULOCYTES NFR BLD: 1 %
KETONES UR QL STRIP: NEGATIVE
LDLC SERPL CALC-MCNC: 120 MG/DL (ref 0–99)
LEUKOCYTE ESTERASE UR QL STRIP: NEGATIVE
LYMPHOCYTES # BLD AUTO: 1.6 X10E3/UL (ref 0.7–3.1)
LYMPHOCYTES NFR BLD AUTO: 20 %
MAGNESIUM SERPL-MCNC: 1.7 MG/DL (ref 1.6–2.3)
MCH RBC QN AUTO: 29.4 PG (ref 26.6–33)
MCHC RBC AUTO-ENTMCNC: 33.7 G/DL (ref 31.5–35.7)
MCV RBC AUTO: 87 FL (ref 79–97)
MICRO URNS: ABNORMAL
MICROALBUMIN UR-MCNC: 307.5 UG/ML
MONOCYTES # BLD AUTO: 1 X10E3/UL (ref 0.1–0.9)
MONOCYTES NFR BLD AUTO: 12 %
NEUTROPHILS # BLD AUTO: 4.7 X10E3/UL (ref 1.4–7)
NEUTROPHILS NFR BLD AUTO: 58 %
NITRITE UR QL STRIP: NEGATIVE
PH UR STRIP: 6 [PH] (ref 5–7.5)
PLATELET # BLD AUTO: 168 X10E3/UL (ref 150–450)
POTASSIUM SERPL-SCNC: 4.5 MMOL/L (ref 3.5–5.2)
PROT SERPL-MCNC: 6.8 G/DL (ref 6–8.5)
PROT UR QL STRIP: ABNORMAL
RBC # BLD AUTO: 4.66 X10E6/UL (ref 4.14–5.8)
RBC #/AREA URNS HPF: NORMAL /HPF (ref 0–2)
SL AMB EGFR AFRICAN AMERICAN: 55 ML/MIN/1.73
SL AMB EGFR NON AFRICAN AMERICAN: 48 ML/MIN/1.73
SL AMB T4, FREE (DIRECT): 1.33 NG/DL (ref 0.82–1.77)
SL AMB URINALYSIS REFLEX: ABNORMAL
SL AMB VLDL CHOLESTEROL CALC: 24 MG/DL (ref 5–40)
SODIUM SERPL-SCNC: 139 MMOL/L (ref 134–144)
SP GR UR: 1.02 (ref 1–1.03)
TRIGL SERPL-MCNC: 134 MG/DL (ref 0–149)
TSH SERPL DL<=0.005 MIU/L-ACNC: 4.83 UIU/ML (ref 0.45–4.5)
UROBILINOGEN UR STRIP-ACNC: 0.2 MG/DL (ref 0.2–1)
WBC # BLD AUTO: 8 X10E3/UL (ref 3.4–10.8)
WBC #/AREA URNS HPF: NORMAL /HPF (ref 0–5)

## 2021-12-09 ENCOUNTER — OFFICE VISIT (OUTPATIENT)
Dept: INTERNAL MEDICINE CLINIC | Facility: CLINIC | Age: 86
End: 2021-12-09
Payer: COMMERCIAL

## 2021-12-09 VITALS
BODY MASS INDEX: 31.98 KG/M2 | WEIGHT: 199 LBS | HEIGHT: 66 IN | DIASTOLIC BLOOD PRESSURE: 70 MMHG | SYSTOLIC BLOOD PRESSURE: 120 MMHG | HEART RATE: 80 BPM | TEMPERATURE: 97.6 F | RESPIRATION RATE: 13 BRPM

## 2021-12-09 DIAGNOSIS — I73.9 PERIPHERAL VASCULAR DISEASE (HCC): ICD-10-CM

## 2021-12-09 DIAGNOSIS — D50.8 IRON DEFICIENCY ANEMIA SECONDARY TO INADEQUATE DIETARY IRON INTAKE: ICD-10-CM

## 2021-12-09 DIAGNOSIS — E11.51 TYPE II DIABETES MELLITUS WITH PERIPHERAL CIRCULATORY DISORDER (HCC): Primary | ICD-10-CM

## 2021-12-09 DIAGNOSIS — M35.3 POLYMYALGIA RHEUMATICA (HCC): ICD-10-CM

## 2021-12-09 DIAGNOSIS — E78.00 PURE HYPERCHOLESTEROLEMIA: ICD-10-CM

## 2021-12-09 DIAGNOSIS — I25.2 HISTORY OF ACUTE INFERIOR WALL MI: ICD-10-CM

## 2021-12-09 DIAGNOSIS — E03.8 SUBCLINICAL HYPOTHYROIDISM: ICD-10-CM

## 2021-12-09 DIAGNOSIS — N18.30 STAGE 3 CHRONIC KIDNEY DISEASE, UNSPECIFIED WHETHER STAGE 3A OR 3B CKD (HCC): ICD-10-CM

## 2021-12-09 DIAGNOSIS — D69.3 IDIOPATHIC THROMBOCYTOPENIC PURPURA (HCC): ICD-10-CM

## 2021-12-09 DIAGNOSIS — I10 ESSENTIAL HYPERTENSION: ICD-10-CM

## 2021-12-09 PROCEDURE — 1036F TOBACCO NON-USER: CPT | Performed by: INTERNAL MEDICINE

## 2021-12-09 PROCEDURE — 3074F SYST BP LT 130 MM HG: CPT | Performed by: INTERNAL MEDICINE

## 2021-12-09 PROCEDURE — 3078F DIAST BP <80 MM HG: CPT | Performed by: INTERNAL MEDICINE

## 2021-12-09 PROCEDURE — 99215 OFFICE O/P EST HI 40 MIN: CPT | Performed by: INTERNAL MEDICINE

## 2021-12-31 DIAGNOSIS — I10 ESSENTIAL HYPERTENSION: ICD-10-CM

## 2022-01-03 RX ORDER — RAMIPRIL 10 MG/1
10 CAPSULE ORAL DAILY
Qty: 90 CAPSULE | Refills: 1 | Status: SHIPPED | OUTPATIENT
Start: 2022-01-03 | End: 2022-07-13 | Stop reason: SDUPTHER

## 2022-02-07 DIAGNOSIS — E78.00 PURE HYPERCHOLESTEROLEMIA: ICD-10-CM

## 2022-02-07 DIAGNOSIS — N40.1 BENIGN PROSTATIC HYPERPLASIA WITH URINARY FREQUENCY: ICD-10-CM

## 2022-02-07 DIAGNOSIS — R35.0 BENIGN PROSTATIC HYPERPLASIA WITH URINARY FREQUENCY: ICD-10-CM

## 2022-02-07 RX ORDER — ATORVASTATIN CALCIUM 40 MG/1
TABLET, FILM COATED ORAL
Qty: 90 TABLET | Refills: 1 | Status: SHIPPED | OUTPATIENT
Start: 2022-02-07

## 2022-02-07 RX ORDER — TAMSULOSIN HYDROCHLORIDE 0.4 MG/1
0.4 CAPSULE ORAL DAILY
Qty: 30 CAPSULE | Refills: 4 | Status: SHIPPED | OUTPATIENT
Start: 2022-02-07 | End: 2022-07-13 | Stop reason: SDUPTHER

## 2022-03-04 LAB
25(OH)D3+25(OH)D2 SERPL-MCNC: 50.1 NG/ML (ref 30–100)
ALBUMIN SERPL-MCNC: 4.2 G/DL (ref 3.6–4.6)
ALBUMIN/GLOB SERPL: 1.8 {RATIO} (ref 1.2–2.2)
ALP SERPL-CCNC: 69 IU/L (ref 44–121)
ALT SERPL-CCNC: 37 IU/L (ref 0–44)
AST SERPL-CCNC: 31 IU/L (ref 0–40)
BASOPHILS # BLD AUTO: 0.1 X10E3/UL (ref 0–0.2)
BASOPHILS NFR BLD AUTO: 1 %
BILIRUB SERPL-MCNC: 0.7 MG/DL (ref 0–1.2)
BUN SERPL-MCNC: 25 MG/DL (ref 8–27)
BUN/CREAT SERPL: 19 (ref 10–24)
CALCIUM SERPL-MCNC: 9.1 MG/DL (ref 8.6–10.2)
CHLORIDE SERPL-SCNC: 103 MMOL/L (ref 96–106)
CHOLEST SERPL-MCNC: 169 MG/DL (ref 100–199)
CO2 SERPL-SCNC: 23 MMOL/L (ref 20–29)
CREAT SERPL-MCNC: 1.31 MG/DL (ref 0.76–1.27)
CRP SERPL-MCNC: <1 MG/L (ref 0–10)
EGFR: 53 ML/MIN/1.73
EOSINOPHIL # BLD AUTO: 0.4 X10E3/UL (ref 0–0.4)
EOSINOPHIL NFR BLD AUTO: 7 %
ERYTHROCYTE [DISTWIDTH] IN BLOOD BY AUTOMATED COUNT: 14.2 % (ref 11.6–15.4)
ERYTHROCYTE [SEDIMENTATION RATE] IN BLOOD BY WESTERGREN METHOD: 16 MM/HR (ref 0–30)
FERRITIN SERPL-MCNC: 167 NG/ML (ref 30–400)
GLOBULIN SER-MCNC: 2.4 G/DL (ref 1.5–4.5)
GLUCOSE SERPL-MCNC: 112 MG/DL (ref 65–99)
HBA1C MFR BLD: 6.6 % (ref 4.8–5.6)
HCT VFR BLD AUTO: 39.5 % (ref 37.5–51)
HDLC SERPL-MCNC: 52 MG/DL
HGB BLD-MCNC: 13.1 G/DL (ref 13–17.7)
IMM GRANULOCYTES # BLD: 0 X10E3/UL (ref 0–0.1)
IMM GRANULOCYTES NFR BLD: 1 %
IRON SATN MFR SERPL: 46 % (ref 15–55)
IRON SERPL-MCNC: 112 UG/DL (ref 38–169)
LDLC SERPL CALC-MCNC: 99 MG/DL (ref 0–99)
LYMPHOCYTES # BLD AUTO: 1.5 X10E3/UL (ref 0.7–3.1)
LYMPHOCYTES NFR BLD AUTO: 29 %
MAGNESIUM SERPL-MCNC: 1.7 MG/DL (ref 1.6–2.3)
MCH RBC QN AUTO: 29.1 PG (ref 26.6–33)
MCHC RBC AUTO-ENTMCNC: 33.2 G/DL (ref 31.5–35.7)
MCV RBC AUTO: 88 FL (ref 79–97)
MONOCYTES # BLD AUTO: 0.7 X10E3/UL (ref 0.1–0.9)
MONOCYTES NFR BLD AUTO: 13 %
NEUTROPHILS # BLD AUTO: 2.6 X10E3/UL (ref 1.4–7)
NEUTROPHILS NFR BLD AUTO: 49 %
PLATELET # BLD AUTO: 170 X10E3/UL (ref 150–450)
POTASSIUM SERPL-SCNC: 4.7 MMOL/L (ref 3.5–5.2)
PROT SERPL-MCNC: 6.6 G/DL (ref 6–8.5)
RBC # BLD AUTO: 4.5 X10E6/UL (ref 4.14–5.8)
SL AMB T4, FREE (DIRECT): 1.24 NG/DL (ref 0.82–1.77)
SL AMB VLDL CHOLESTEROL CALC: 18 MG/DL (ref 5–40)
SODIUM SERPL-SCNC: 141 MMOL/L (ref 134–144)
TIBC SERPL-MCNC: 245 UG/DL (ref 250–450)
TRIGL SERPL-MCNC: 96 MG/DL (ref 0–149)
TSH SERPL DL<=0.005 MIU/L-ACNC: 6.43 UIU/ML (ref 0.45–4.5)
UIBC SERPL-MCNC: 133 UG/DL (ref 111–343)
WBC # BLD AUTO: 5.2 X10E3/UL (ref 3.4–10.8)

## 2022-03-14 ENCOUNTER — OFFICE VISIT (OUTPATIENT)
Dept: INTERNAL MEDICINE CLINIC | Facility: CLINIC | Age: 87
End: 2022-03-14
Payer: COMMERCIAL

## 2022-03-14 VITALS
WEIGHT: 213 LBS | BODY MASS INDEX: 34.23 KG/M2 | TEMPERATURE: 97.1 F | HEIGHT: 66 IN | OXYGEN SATURATION: 96 % | DIASTOLIC BLOOD PRESSURE: 60 MMHG | HEART RATE: 65 BPM | RESPIRATION RATE: 18 BRPM | SYSTOLIC BLOOD PRESSURE: 120 MMHG

## 2022-03-14 DIAGNOSIS — M35.3 POLYMYALGIA RHEUMATICA (HCC): ICD-10-CM

## 2022-03-14 DIAGNOSIS — Z00.00 HEALTHCARE MAINTENANCE: ICD-10-CM

## 2022-03-14 DIAGNOSIS — E61.1 LOW IRON: ICD-10-CM

## 2022-03-14 DIAGNOSIS — M81.0 AGE RELATED OSTEOPOROSIS, UNSPECIFIED PATHOLOGICAL FRACTURE PRESENCE: ICD-10-CM

## 2022-03-14 DIAGNOSIS — D69.3 IDIOPATHIC THROMBOCYTOPENIC PURPURA (HCC): ICD-10-CM

## 2022-03-14 DIAGNOSIS — I10 ESSENTIAL HYPERTENSION: ICD-10-CM

## 2022-03-14 DIAGNOSIS — N18.30 STAGE 3 CHRONIC KIDNEY DISEASE, UNSPECIFIED WHETHER STAGE 3A OR 3B CKD (HCC): ICD-10-CM

## 2022-03-14 DIAGNOSIS — E11.51 TYPE II DIABETES MELLITUS WITH PERIPHERAL CIRCULATORY DISORDER (HCC): Primary | ICD-10-CM

## 2022-03-14 DIAGNOSIS — E03.8 SUBCLINICAL HYPOTHYROIDISM: ICD-10-CM

## 2022-03-14 DIAGNOSIS — I73.9 PERIPHERAL VASCULAR DISEASE (HCC): ICD-10-CM

## 2022-03-14 DIAGNOSIS — E55.9 VITAMIN D DEFICIENCY: ICD-10-CM

## 2022-03-14 DIAGNOSIS — E78.00 PURE HYPERCHOLESTEROLEMIA: ICD-10-CM

## 2022-03-14 PROCEDURE — 3074F SYST BP LT 130 MM HG: CPT | Performed by: INTERNAL MEDICINE

## 2022-03-14 PROCEDURE — 3078F DIAST BP <80 MM HG: CPT | Performed by: INTERNAL MEDICINE

## 2022-03-14 PROCEDURE — G0439 PPPS, SUBSEQ VISIT: HCPCS | Performed by: INTERNAL MEDICINE

## 2022-03-14 PROCEDURE — 1125F AMNT PAIN NOTED PAIN PRSNT: CPT | Performed by: INTERNAL MEDICINE

## 2022-03-14 PROCEDURE — 99214 OFFICE O/P EST MOD 30 MIN: CPT | Performed by: INTERNAL MEDICINE

## 2022-03-14 PROCEDURE — 3288F FALL RISK ASSESSMENT DOCD: CPT | Performed by: INTERNAL MEDICINE

## 2022-03-14 PROCEDURE — 1160F RVW MEDS BY RX/DR IN RCRD: CPT | Performed by: INTERNAL MEDICINE

## 2022-03-14 PROCEDURE — 1170F FXNL STATUS ASSESSED: CPT | Performed by: INTERNAL MEDICINE

## 2022-03-14 PROCEDURE — 1036F TOBACCO NON-USER: CPT | Performed by: INTERNAL MEDICINE

## 2022-03-14 RX ORDER — FERROUS SULFATE 325(65) MG
TABLET ORAL
Qty: 12 TABLET | Refills: 4 | Status: SHIPPED | OUTPATIENT
Start: 2022-03-14

## 2022-03-14 NOTE — PROGRESS NOTES
BMI Counseling: There is no height or weight on file to calculate BMI  The BMI is above normal  Nutrition recommendations include reducing portion sizes and decreasing overall calorie intake  Exercise recommendations include exercising 3-5 times per week    Answers for HPI/ROS submitted by the patient on 3/9/2022  How often during the last year have you found that you were not able to stop drinking once you had started?: 0 - never  How often during the last year have you failed to do what was normally expected from you because of drinking?: 0 - never  How often during the last year have you needed a first drink in the morning to get yourself going after a heavy drinking session?: 0 - never  How often during the last year have you had a feeling of guilt or remorse after drinking?: 0 - never  How often during the last year have you been unable to remember what happened the night before because you had been drinking?: 0 - never  Have you or someone else been injured as a result of your drinking?: 0 - no  Has a relative or friend or a doctor or another health worker been concerned about your drinking or suggested you cut down?: 0 - no  How would you rate your overall health?: good  Compared to last year, how is your physical health?: same  In general, how satisfied are you with your life?: very satisfied  Compared to last year, how is your eyesight?: slightly worse  Compared to last year, how is your hearing?: same  Compared to last year, how is your emotional/mental health?: same  How often is anger a problem for you?: never, rarely  How often do you feel unusually tired/fatigued?: sometimes  In the past 7 days, how much pain have you experienced?: none  In the past 6 months, have you lost or gained 10 pounds without trying?: No  One or more falls in the last year: No  Do you have trouble with the stairs inside or outside your home?: No  Does your home have working smoke alarms?: Yes  Does your home have a carbon monoxide monitor?: Yes  Which safety hazards (if any) have you experienced in your home? Please select all that apply : none  How would you describe your current diet?  Please select all that apply : Regular  In addition to prescription medications, are you taking any over-the-counter supplements?: Yes  If yes, what supplements are you taking?: vitamin C  Can you manage your medications?: Yes  Are you currently taking any opioid medications?: No  Can you walk and transfer into and out of your bed and chair?: Yes  Can you dress and groom yourself?: Yes  Can you bathe or shower yourself?: Yes  Can you feed yourself?: Yes  Can you do your laundry/ housekeeping?: Yes  Can you manage your money, pay your bills, and track your expenses?: Yes  Can you make your own meals?: Yes  Can you do your own shopping?: Yes  Within the last 12 months, have you had any hospitalizations or Emergency Department visits?: No  Do you have a living will?: Yes  Do you have a Durable POA (Power of ) for healthcare decisions?: Yes  Do you have an Advanced Directive for end of life decisions?: Yes  How often have you used an illegal drug (including marijuana) or a prescription medication for non-medical reasons in the past year?: never  What is the typical number of drinks you consume in a day?: 1  What is the typical number of drinks you consume in a week?: 7  How often did you have a drink containing alcohol in the past year?: 4 or more times a week  How many drinks did you have on a typical day  when you were drinking in the past year?: 1 to 2  How often did you have 6 or more drinks on one occasion in the past year?: never     Assessment and Plan:     Problem List Items Addressed This Visit        Endocrine    Type II diabetes mellitus with peripheral circulatory disorder ([facility] [address] ) - Primary    Subclinical hypothyroidism       Cardiovascular and Mediastinum    Peripheral vascular disease ([facility] [address] )    Essential hypertension       Hematopoietic and Hemostatic    Idiopathic thrombocytopenic purpura ([facility] )       Genitourinary    Stage 3 chronic kidney disease, unspecified whether stage 3a or 3b CKD ([place] [address] )       Other    Pure hypercholesterolemia    Vitamin D deficiency        BMI Counseling: Body mass index is 34 38 kg/m²  The BMI is above normal  Nutrition recommendations include decreasing portion sizes, encouraging healthy choices of fruits and vegetables, decreasing fast food intake, consuming healthier snacks, limiting drinks that contain sugar, moderation in carbohydrate intake, increasing intake of lean protein, reducing intake of saturated and trans fat and reducing intake of cholesterol  Exercise recommendations include exercising 3-5 times per week  No pharmacotherapy was ordered  Patient referred to PCP  Rationale for BMI follow-up plan is due to patient being overweight or obese  Preventive health issues were discussed with patient, and age appropriate screening tests were ordered as noted in patient's After Visit Summary  Personalized health advice and appropriate referrals for health education or preventive services given if needed, as noted in patient's After Visit Summary       History of Present Illness:     Patient presents for Medicare Annual Wellness visit    Patient Care Team:  Ja Chauhan MD as PCP - General  MD Matt Heath (Vascular Surgery)  Ashley Shaffer MD (Vascular Surgery)  Adenike Byrnes MD (Rheumatology)     Problem List:     Patient Active Problem List   Diagnosis    Type II diabetes mellitus with peripheral circulatory disorder [facility])    Peripheral vascular disease ([place] [address] )    AAA (abdominal aortic aneurysm) without rupture ([place] [address] )    Essential hypertension    Pure hypercholesterolemia    Endoleak post (EVAR) endovascular aneurysm repair, initial encounter    History of acute inferior wall MI    Preop cardiovascular exam    Vitamin D deficiency    Subclinical hypothyroidism  Polymyalgia rheumatica (HCC)    Guaiac positive stools    Idiopathic thrombocytopenic purpura (HCC)    Stage 3 chronic kidney disease, unspecified whether stage 3a or 3b CKD (HCC)    Numbness and tingling in left arm    Osteoarthritis of spine with radiculopathy, cervical region    Cervical spinal stenosis    Carpal tunnel syndrome of left wrist      Past Medical and Surgical History:     Past Medical History:   Diagnosis Date    Allergic rhinitis     resolved 12/15/2017    Anemia     resolved 12/15/2017    Arthritis     Cataract     resolved 12/15/2017    Coronary artery disease     Hearing problem     Heart attack ([address] )     Hiatal hernia     Hyperlipidemia     Hypertension     Impaired fasting glucose     resolved 12/15/2017    Numbness of leg     resolved 12/15/2017    Pneumonia 03/2019    Polyarthritis     resolved 12/15/2017    PONV (postoperative nausea and vomiting)     [facility]peripheral vascular disease) ([facility])      Past Surgical History:   Procedure Laterality Date    CARDIAC SURGERY      CATARACT EXTRACTION Bilateral     CORONARY ANGIOPLASTY WITH STENT PLACEMENT      IR EVAR      IR EVAR  7/1/2019    OTHER SURGICAL HISTORY      detatched bicep tendon    UT EVASC RPR DPLMNT AORTO-AORTIC NDGFT N/A 7/1/2019    Procedure: REVISION EVAR WITH AORTIC EXTENSION CUFF X3 WITH 37R12cr GORE, RIGHT RENAL STENTING WITH 6X16mm iCASt;  Surgeon: Fide Ramsey MD;  Location: [facility];  Service: Vascular      Family History:     Family History   Problem Relation Age of Onset    Anemia Mother     No Known Problems Father     Colon cancer Maternal Grandmother       Social History:     Social History     Socioeconomic History    Marital status: /Civil Union     Spouse name: Not on file    Number of children: Not on file    Years of education: Not on file    Highest education level: Not on file   Occupational History    Not on file   Tobacco Use    Smoking status: Former Smoker Packs/day: 3 00     Years: 30 00     Pack years: 90 00     Quit date:      Years since quittin 2    Smokeless tobacco: Never Used    Tobacco comment: quit around the 80's   Substance and Sexual Activity    Alcohol use: Yes     Comment: soically    Drug use: Never    Sexual activity: Not Currently   Other Topics Concern    Not on file   Social History Narrative    Not on file     Social Determinants of Health     Financial Resource Strain: Not on file   Food Insecurity: Not on file   Transportation Needs: Not on file   Physical Activity: Not on file   Stress: Not on file   Social Connections: Not on file   Intimate Partner Violence: Not on file   Housing Stability: Not on file      Medications and Allergies:     Current Outpatient Medications   Medication Sig Dispense Refill    acetaminophen (TYLENOL) 325 mg tablet Take 2 tablets (650 mg total) by mouth every 6 (six) hours as needed for mild pain (Patient not taking: Reported on 2021)      amLODIPine (NORVASC) 10 mg tablet Take 1 tablet (10 mg total) by mouth daily 90 tablet 1    Ascorbic Acid (VITAMIN C PO) Take 1,000 mg by mouth daily      aspirin 81 MG tablet Take 1 tablet by mouth daily      atorvastatin (LIPITOR) 40 mg tablet Take 1 tablet daily 90 tablet 1    bisoprolol (ZEBETA) 5 mg tablet Take 1 tablet (5 mg total) by mouth daily 90 tablet 1    Blood Pressure KIT by Does not apply route 2 (two) times a week 1 each 0    Cholecalciferol (Vitamin D3) 25 MCG (1000 UT) CAPS TAKE 1 CAPSULE ON   AND FRI   30 capsule 2    clotrimazole-betamethasone (LOTRISONE) 1-0 05 % cream apply topically to affected area twice a day (Patient not taking: Reported on 8/3/2021) 30 g 1    ferrous sulfate (FeroSul) 325 (65 Fe) mg tablet Take one tablet Mon, Wed and Friday 12 tablet 4    folic acid (FOLVITE) 1 mg tablet take 1 tablet by mouth once daily 90 tablet 1    glucose blood (TRUETRACK TEST) test strip by In Vitro route 2 (two) times a day      indapamide (LOZOL) 2 5 mg tablet Take 1 tablet (2 5 mg total) by mouth daily 90 tablet 1    ketoconazole (NIZORAL) 2 % cream Apply topically daily (Patient not taking: Reported on 11/5/2021) 15 g 0    Lysine HCl 1000 MG TABS Take 1,000 mg by mouth daily       magnesium chloride-calcium (MAG-SR PLUS CALCIUM)  mg Take 2 tablets by mouth daily      multivitamin-minerals (CENTRUM ADULTS) tablet Take 1 tablet by mouth daily      omeprazole (PriLOSEC) 40 MG capsule Take 1 capsule (40 mg total) by mouth daily 90 capsule 1    predniSONE 1 MG TBEC 1 mg daily (Patient taking differently: 2 mg daily)      ramipril (ALTACE) 10 MG capsule TAKE 1 CAPSULE (10 MG TOTAL) BY MOUTH DAILY 90 capsule 1    tamsulosin (FLOMAX) 0 4 mg Take 1 capsule (0 4 mg total) by mouth daily 30 capsule 4     No current facility-administered medications for this visit       Allergies   Allergen Reactions    Sulfamethoxazole-Trimethoprim Nausea Only      Immunizations:     Immunization History   Administered Date(s) Administered    COVID-19 MODERNA VACC 0 5 ML IM 02/06/2021, 03/04/2021    INFLUENZA 12/08/2004, 10/24/2005, 09/19/2011, 09/30/2014, 09/01/2015, 10/07/2016, 11/02/2017    Influenza Quadrivalent Preservative Free 3 years and older IM 09/01/2015    Influenza Split High Dose Preservative Free IM 10/07/2016, 11/02/2017, 11/02/2017    Influenza, high dose seasonal 0 7 mL 09/26/2018, 10/30/2019, 11/04/2020    Influenza, seasonal, injectable 08/24/2012, 09/25/2013, 09/30/2014    Pneumococcal Conjugate 13-Valent 04/20/2015    Pneumococcal Polysaccharide PPV23 02/01/2008, 08/18/2014, 02/21/2018    Zoster 08/26/2010    Zoster Vaccine Recombinant 06/29/2018, 09/27/2018    influenza, injectable, quadrivalent 10/06/2021      Health Maintenance:         Topic Date Due    Colorectal Cancer Screening  01/29/2020         Topic Date Due    DTaP,Tdap,and Td Vaccines (1 - Tdap) Never done    COVID-19 Vaccine (3 - Booster for Moderna series) 08/04/2021      Medicare Health Risk Assessment:     There were no vitals taken for this visit  Health Risk Assessment:   Patient rates overall health as good  Patient feels that their physical health rating is same  Patient is very satisfied with their life  Eyesight was rated as slightly worse  Hearing was rated as same  Patient feels that their emotional and mental health rating is same  Patients states they are never, rarely angry  Patient states they are sometimes unusually tired/fatigued  Pain experienced in the last 7 days has been none  Patient states that he has experienced no weight loss or gain in last 6 months  You gained 13 lb you need to watch her diet    Fall Risk Screening: In the past year, patient has experienced: no history of falling in past year      Home Safety:  Patient does not have trouble with stairs inside or outside of their home  Patient has working smoke alarms and has working carbon monoxide detector  Home safety hazards include: none  Nutrition:   Current diet is Regular  Medications:   Patient is currently taking over-the-counter supplements  OTC medications include: vitamin C  Patient is able to manage medications  Activities of Daily Living (ADLs)/Instrumental Activities of Daily Living (IADLs):   Walk and transfer into and out of bed and chair?: Yes  Dress and groom yourself?: Yes    Bathe or shower yourself?: Yes    Feed yourself? Yes  Do your laundry/housekeeping?: Yes  Manage your money, pay your bills and track your expenses?: Yes  Make your own meals?: Yes    Do your own shopping?: Yes    Previous Hospitalizations:   Any hospitalizations or ED visits within the last 12 months?: No      Advance Care Planning:   Living will: Yes    Durable POA for healthcare:  Yes    Advanced directive: Yes    End of Life Decisions reviewed with patient: Yes    Provider agrees with end of life decisions: Yes      Cognitive Screening:   Provider or family/friend/caregiver concerned regarding cognition?: No    PREVENTIVE SCREENINGS      Cardiovascular Screening:    General: Screening Not Indicated and History Lipid Disorder      Diabetes Screening:     General: Screening Not Indicated and History Diabetes      Colorectal Cancer Screening:     General: Screening Not Indicated      Prostate Cancer Screening:    General: Screening Not Indicated      Osteoporosis Screening:    General: Risks and Benefits Discussed    Due for: DXA Appendicular      Abdominal Aortic Aneurysm (AAA) Screening:    Risk factors include: tobacco use        General: Screening Not Indicated and History AAA      Lung Cancer Screening:     General: Screening Not Indicated      Hepatitis C Screening:    General: Risks and Benefits Discussed    Hep C Screening Accepted: Yes      Screening, Brief Intervention, and Referral to Treatment (SBIRT)    Screening  Typical number of drinks in a day: 1  Typical number of drinks in a week: 7  Interpretation: Low risk drinking behavior  AUDIT-C Screenin) How often did you have a drink containing alcohol in the past year? 4 or more times a week  2) How many drinks did you have on a typical day when you were drinking in the past year? 1 to 2  3) How often did you have 6 or more drinks on one occasion in the past year? never    AUDIT-C Score: 4  Interpretation: Score 4-12 (male): POSITIVE screen for alcohol misuse    AUDIT Screenin) How often during the last year have you found that you were not able to stop drinking once you had started? 0 - never  5) How often during the last year have you failed to do what was normally expected from you because of drinking? 0 - never  6) How often during the last year have you needed a first drink in the morning to get yourself going after a heavy drinking session?  0 - never  7) How often during the last year have you had a feeling of guilt or remorse after drinking? 0 - never  8) How often during the last year have you been unable to remember what happened the night before because you had been drinking? 0 - never  9) Have you or someone else been injured as a result of your drinking? 0 - no  10) Has a relative or friend or a doctor or another health worker been concerned about your drinking or suggested you cut down? 0 - no    AUDIT Score: 4  Interpretation: Low risk alcohol consumption    Single Item Drug Screening:  How often have you used an illegal drug (including marijuana) or a prescription medication for non-medical reasons in the past year? never    Single Item Drug Screen Score: 0  Interpretation: Negative screen for possible drug use disorder    Other Counseling Topics:   Car/seat belt/driving safety, skin self-exam, sunscreen and calcium and vitamin D intake and regular weightbearing exercise         Jaziel Huiazr MD

## 2022-03-14 NOTE — PROGRESS NOTES
Assessment/Plan:        1  Diabetes well controlled hemoglobin A1c is less than 7 no polyuria polydipsia he gained about 13 lb he needs to watch his diet  Avoid getting more weight  Will continue same medications and plan  2  Blood pressure very well control his medications renal function is normal as well as electrolytes no change in medication or plan  3  Hyperlipidemia Lipitor 40 mg controlling the lipids quite well the LDL is less than 100 is at goal no change in medication or plans no myalgias    4  Low iron has been replace we cut down the iron tablet to once a week  Will check the iron levels with the CBC when he comes back  Subclinical hypothyroidism TSH slightly elevated the T4 is normal continue to observe no need for treatment at this particular time    History of thrombocytopenia resolve his platelet count is normal is 170,000  Labs review    Results for orders placed or performed in visit on 03/03/22   CBC and differential   Result Value Ref Range    White Blood Cell Count 5 2 3 4 - 10 8 x10E3/uL    Red Blood Cell Count 4 50 4  14 - 5 80 x10E6/uL    Hemoglobin 13 1 13 0 - 17 7 g/dL    HCT 39 5 37 5 - 51 0 %    MCV 88 79 - 97 fL    MCH 29 1 26 6 - 33 0 pg    MCHC 33 2 31 5 - 35 7 g/dL    RDW 14 2 11 6 - 15 4 %    Platelet Count 108 002 - 450 x10E3/uL    Neutrophils 49 Not Estab  %    Lymphocytes 29 Not Estab  %    Monocytes 13 Not Estab  %    Eosinophils 7 Not Estab  %    Basophils PCT 1 Not Estab  %    Neutrophils (Absolute) 2 6 1 4 - 7 0 x10E3/uL    Lymphocytes (Absolute) 1 5 0 7 - 3 1 x10E3/uL    Monocytes (Absolute) 0 7 0 1 - 0 9 x10E3/uL    Eosinophils (Absolute) 0 4 0 0 - 0 4 x10E3/uL    Basophils ABS 0 1 0 0 - 0 2 x10E3/uL    Immature Granulocytes 1 Not Estab  %    Immature Granulocytes (Absolute) 0 0 0 0 - 0 1 x10E3/uL   Comprehensive metabolic panel   Result Value Ref Range    Glucose, Random 112 (H) 65 - 99 mg/dL    BUN 25 8 - 27 mg/dL    Creatinine 1 31 (H) 0 76 - 1 27 mg/dL eGFR 53 (L) >59 mL/min/1 73    SL AMB BUN/CREATININE RATIO 19 10 - 24    Sodium 141 134 - 144 mmol/L    Potassium 4 7 3 5 - 5 2 mmol/L    Chloride 103 96 - 106 mmol/L    CO2 23 20 - 29 mmol/L    CALCIUM 9 1 8 6 - 10 2 mg/dL    Protein, Total 6 6 6 0 - 8 5 g/dL    Albumin 4 2 3 6 - 4 6 g/dL    Globulin, Total 2 4 1 5 - 4 5 g/dL    Albumin/Globulin Ratio 1 8 1 2 - 2 2    TOTAL BILIRUBIN 0 7 0 0 - 1 2 mg/dL    Alk Phos Isoenzymes 69 44 - 121 IU/L    AST 31 0 - 40 IU/L    ALT 37 0 - 44 IU/L   Lipid Panel with Direct LDL reflex   Result Value Ref Range    Cholesterol, Total 169 100 - 199 mg/dL    Triglycerides 96 0 - 149 mg/dL    HDL 52 >39 mg/dL    VLDL Cholesterol Calculated 18 5 - 40 mg/dL    LDL Calculated 99 0 - 99 mg/dL   TIBC   Result Value Ref Range    Total Iron Binding Capacity (TIBC) 245 (L) 250 - 450 ug/dL    UIBC 133 111 - 343 ug/dL    Iron, Serum 112 38 - 169 ug/dL    Iron Saturation 46 15 - 55 %   Hemoglobin A1c (w/out EAG)   Result Value Ref Range    Hemoglobin A1C 6 6 (H) 4 8 - 5 6 %   Vitamin D 25 hydroxy   Result Value Ref Range    25-HYDROXY VIT D 50 1 30 0 - 100 0 ng/mL   TSH, 3rd generation with Free T4 reflex   Result Value Ref Range    TSH 6 430 (H) 0 450 - 4 500 uIU/mL   T4, free   Result Value Ref Range    T4,Free (Direct) 1 24 0 82 - 1 77 ng/dL   Sedimentation rate, automated   Result Value Ref Range    Sedimentation Rate 16 0 - 30 mm/hr   Magnesium   Result Value Ref Range    Magnesium, Serum 1 7 1 6 - 2 3 mg/dL   Ferritin   Result Value Ref Range    Ferritin 167 30 - 400 ng/mL   C-reactive protein   Result Value Ref Range    C-Reactive Protein, Quant <1 0 - 10 mg/L       No problem-specific Assessment & Plan notes found for this encounter         Diagnoses and all orders for this visit:    Type II diabetes mellitus with peripheral circulatory disorder ([facility] )    Subclinical hypothyroidism    Essential hypertension    Peripheral vascular disease ([facility] )    Idiopathic thrombocytopenic purpura ([facility] )    Stage 3 chronic kidney disease, unspecified whether stage 3a or 3b CKD ([place] [address] )    Pure hypercholesterolemia    Vitamin D deficiency          Subjective:      Patient ID: Gillian Lopez is a 81 y o  male  No chief complaint on file  Current Outpatient Medications:     acetaminophen (TYLENOL) 325 mg tablet, Take 2 tablets (650 mg total) by mouth every 6 (six) hours as needed for mild pain (Patient not taking: Reported on 11/5/2021), Disp: , Rfl:     amLODIPine (NORVASC) 10 mg tablet, Take 1 tablet (10 mg total) by mouth daily, Disp: 90 tablet, Rfl: 1    Ascorbic Acid (VITAMIN C PO), Take 1,000 mg by mouth daily, Disp: , Rfl:     aspirin 81 MG tablet, Take 1 tablet by mouth daily, Disp: , Rfl:     atorvastatin (LIPITOR) 40 mg tablet, Take 1 tablet daily, Disp: 90 tablet, Rfl: 1    bisoprolol (ZEBETA) 5 mg tablet, Take 1 tablet (5 mg total) by mouth daily, Disp: 90 tablet, Rfl: 1    Blood Pressure KIT, by Does not apply route 2 (two) times a week, Disp: 1 each, Rfl: 0    Cholecalciferol (Vitamin D3) 25 MCG (1000 UT) CAPS, TAKE 1 CAPSULE ON MON WED   AND FRI , Disp: 30 capsule, Rfl: 2    clotrimazole-betamethasone (LOTRISONE) 1-0 05 % cream, apply topically to affected area twice a day (Patient not taking: Reported on 8/3/2021), Disp: 30 g, Rfl: 1    ferrous sulfate (FeroSul) 325 (65 Fe) mg tablet, Take one tablet Mon, Wed and Friday, Disp: 12 tablet, Rfl: 4    folic acid (FOLVITE) 1 mg tablet, take 1 tablet by mouth once daily, Disp: 90 tablet, Rfl: 1    glucose blood (TRUETRACK TEST) test strip, by In Vitro route 2 (two) times a day, Disp: , Rfl:     indapamide (LOZOL) 2 5 mg tablet, Take 1 tablet (2 5 mg total) by mouth daily, Disp: 90 tablet, Rfl: 1    ketoconazole (NIZORAL) 2 % cream, Apply topically daily (Patient not taking: Reported on 11/5/2021), Disp: 15 g, Rfl: 0    Lysine HCl 1000 MG TABS, Take 1,000 mg by mouth daily , Disp: , Rfl:     magnesium chloride-calcium (MAG-SR PLUS CALCIUM)  mg, Take 2 tablets by mouth daily, Disp: , Rfl:     multivitamin-minerals (CENTRUM ADULTS) tablet, Take 1 tablet by mouth daily, Disp: , Rfl:     omeprazole (PriLOSEC) 40 MG capsule, Take 1 capsule (40 mg total) by mouth daily, Disp: 90 capsule, Rfl: 1    predniSONE 1 MG TBEC, 1 mg daily (Patient taking differently: 2 mg daily), Disp: , Rfl:     ramipril (ALTACE) 10 MG capsule, TAKE 1 CAPSULE (10 MG TOTAL) BY MOUTH DAILY, Disp: 90 capsule, Rfl: 1    tamsulosin (FLOMAX) 0 4 mg, Take 1 capsule (0 4 mg total) by mouth daily, Disp: 30 capsule, Rfl: 4    HPI    The following portions of the patient's history were reviewed and updated as appropriate: allergies, current medications, past family history, past medical history, past social history, past surgical history and problem list     Review of Systems   Constitutional: Negative  Negative for activity change, appetite change, fatigue, fever and unexpected weight change  HENT: Negative for congestion, ear pain, hearing loss, mouth sores, postnasal drip, rhinorrhea, sore throat, trouble swallowing and voice change  Eyes: Negative for pain, redness and visual disturbance  Respiratory: Negative for cough, chest tightness, shortness of breath and wheezing  Cardiovascular: Negative for chest pain, palpitations and leg swelling  Gastrointestinal: Negative for abdominal distention, abdominal pain, blood in stool, constipation, diarrhea and nausea  Endocrine: Negative for cold intolerance, heat intolerance, polydipsia, polyphagia and polyuria  Genitourinary: Negative for difficulty urinating, dysuria, flank pain, frequency, hematuria and urgency  Musculoskeletal: Negative for arthralgias, back pain, gait problem, joint swelling and myalgias  Skin: Negative for color change and pallor  Neurological: Negative for dizziness, tremors, seizures, syncope, weakness, numbness and headaches  Hematological: Negative for adenopathy   Does not bruise/bleed easily  Psychiatric/Behavioral: Negative  Negative for sleep disturbance  The patient is not nervous/anxious  Objective: There were no vitals taken for this visit  Physical Exam  Vitals and nursing note reviewed  Constitutional:       Appearance: He is well-developed  HENT:      Head: Normocephalic  Right Ear: External ear normal       Left Ear: External ear normal       Nose: Nose normal       Mouth/Throat:      Pharynx: No oropharyngeal exudate  Eyes:      Conjunctiva/sclera: Conjunctivae normal       Pupils: Pupils are equal, round, and reactive to light  Neck:      Thyroid: No thyromegaly  Cardiovascular:      Rate and Rhythm: Normal rate and regular rhythm  Heart sounds: Normal heart sounds  No murmur heard  No friction rub  No gallop  Comments: S1-S2 regular rhythm  Extremities no calf tenderness +1 edema    Pulmonary:      Effort: Pulmonary effort is normal  No respiratory distress  Breath sounds: Normal breath sounds  No wheezing or rales  Comments: Lungs are clear no wheezing rales or rhonchi  Abdominal:      General: Bowel sounds are normal  There is no distension  Palpations: Abdomen is soft  There is no mass  Tenderness: There is no abdominal tenderness  There is no guarding or rebound  Musculoskeletal:         General: Normal range of motion  Cervical back: Normal range of motion and neck supple  Lymphadenopathy:      Cervical: No cervical adenopathy  Skin:     General: Skin is warm and dry  Neurological:      Mental Status: He is alert and oriented to person, place, and time     Psychiatric:         Behavior: Behavior normal          Judgment: Judgment normal

## 2022-03-14 NOTE — PATIENT INSTRUCTIONS
Medicare Preventive Visit Patient Instructions  Thank you for completing your Welcome to Medicare Visit or Medicare Annual Wellness Visit today  Your next wellness visit will be due in one year (3/15/2023)  The screening/preventive services that you may require over the next 5-10 years are detailed below  Some tests may not apply to you based off risk factors and/or age  Screening tests ordered at today's visit but not completed yet may show as past due  Also, please note that scanned in results may not display below  Preventive Screenings:  Service Recommendations Previous Testing/Comments   Colorectal Cancer Screening  · Colonoscopy    · Fecal Occult Blood Test (FOBT)/Fecal Immunochemical Test (FIT)  · Fecal DNA/Cologuard Test  · Flexible Sigmoidoscopy Age: 54-65 years old   Colonoscopy: every 10 years (May be performed more frequently if at higher risk)  OR  FOBT/FIT: every 1 year  OR  Cologuard: every 3 years  OR  Sigmoidoscopy: every 5 years  Screening may be recommended earlier than age 48 if at higher risk for colorectal cancer  Also, an individualized decision between you and your healthcare provider will decide whether screening between the ages of 74-80 would be appropriate   Colonoscopy: 12/06/2010  FOBT/FIT: 01/28/2020  Cologuard: Not on file  Sigmoidoscopy: Not on file    Screening Not Indicated     Prostate Cancer Screening Individualized decision between patient and health care provider in men between ages of 53-78   Medicare will cover every 12 months beginning on the day after your 50th birthday PSA: No results in last 5 years     Screening Not Indicated     Hepatitis C Screening Once for adults born between [birth year] and 1965  More frequently in patients at high risk for Hepatitis C Hep C Antibody: Not on file        Diabetes Screening 1-2 times per year if you're at risk for diabetes or have pre-diabetes Fasting glucose: 116 mg/dL   A1C: 6 6 %    Screening Not Indicated  History Diabetes Cholesterol Screening Once every 5 years if you don't have a lipid disorder  May order more often based on risk factors  Lipid panel: 03/03/2022    Screening Not Indicated  History Lipid Disorder      Other Preventive Screenings Covered by Medicare:  1  Abdominal Aortic Aneurysm (AAA) Screening: covered once if your at risk  You're considered to be at risk if you have a family history of AAA or a male between the age of 73-68 who smoking at least 100 cigarettes in your lifetime  2  Lung Cancer Screening: covers low dose CT scan once per year if you meet all of the following conditions: (1) Age 50-69; (2) No signs or symptoms of lung cancer; (3) Current smoker or have quit smoking within the last 15 years; (4) You have a tobacco smoking history of at least 30 pack years (packs per day x number of years you smoked); (5) You get a written order from a healthcare provider  3  Glaucoma Screening: covered annually if you're considered high risk: (1) You have diabetes OR (2) Family history of glaucoma OR (3)  aged 48 and older OR (3)  American aged 72 and older  3  Osteoporosis Screening: covered every 2 years if you meet one of the following conditions: (1) Have a vertebral abnormality; (2) On glucocorticoid therapy for more than 3 months; (3) Have primary hyperparathyroidism; (4) On osteoporosis medications and need to assess response to drug therapy  5  HIV Screening: covered annually if you're between the age of 12-76  Also covered annually if you are younger than 13 and older than 72 with risk factors for HIV infection  For pregnant patients, it is covered up to 3 times per pregnancy      Immunizations:  Immunization Recommendations   Influenza Vaccine Annual influenza vaccination during flu season is recommended for all persons aged >= 6 months who do not have contraindications   Pneumococcal Vaccine (Prevnar and Pneumovax)  * Prevnar = PCV13  * Pneumovax = PPSV23 Adults 25-60 years old: 1-3 doses may be recommended based on certain risk factors  Adults 72 years old: Prevnar (PCV13) vaccine recommended followed by Pneumovax (PPSV23) vaccine  If already received PPSV23 since turning 65, then PCV13 recommended at least one year after PPSV23 dose  Hepatitis B Vaccine 3 dose series if at intermediate or high risk (ex: diabetes, end stage renal disease, liver disease)   Tetanus (Td) Vaccine - COST NOT COVERED BY MEDICARE PART B Following completion of primary series, a booster dose should be given every 10 years to maintain immunity against tetanus  Td may also be given as tetanus wound prophylaxis  Tdap Vaccine - COST NOT COVERED BY MEDICARE PART B Recommended at least once for all adults  For pregnant patients, recommended with each pregnancy  Shingles Vaccine (Shingrix) - COST NOT COVERED BY MEDICARE PART B  2 shot series recommended in those aged 48 and above     Health Maintenance Due:      Topic Date Due    Colorectal Cancer Screening  01/29/2020     Immunizations Due:      Topic Date Due    DTaP,Tdap,and Td Vaccines (1 - Tdap) Never done    COVID-19 Vaccine (3 - Booster for Moderna series) 08/04/2021     Advance Directives   What are advance directives? Advance directives are legal documents that state your wishes and plans for medical care  These plans are made ahead of time in case you lose your ability to make decisions for yourself  Advance directives can apply to any medical decision, such as the treatments you want, and if you want to donate organs  What are the types of advance directives? There are many types of advance directives, and each state has rules about how to use them  You may choose a combination of any of the following:  · Living will: This is a written record of the treatment you want  You can also choose which treatments you do not want, which to limit, and which to stop at a certain time  This includes surgery, medicine, IV fluid, and tube feedings  · Durable power of  for healthcare [place] [facility]): This is a written record that states who you want to make healthcare choices for you when you are unable to make them for yourself  This person, called a proxy, is usually a family member or a friend  You may choose more than 1 proxy  · Do not resuscitate (DNR) order:  A DNR order is used in case your heart stops beating or you stop breathing  It is a request not to have certain forms of treatment, such as CPR  A DNR order may be included in other types of advance directives  · Medical directive: This covers the care that you want if you are in a coma, near death, or unable to make decisions for yourself  You can list the treatments you want for each condition  Treatment may include pain medicine, surgery, blood transfusions, dialysis, IV or tube feedings, and a ventilator (breathing machine)  · Values history: This document has questions about your views, beliefs, and how you feel and think about life  This information can help others choose the care that you would choose  Why are advance directives important? An advance directive helps you control your care  Although spoken wishes may be used, it is better to have your wishes written down  Spoken wishes can be misunderstood, or not followed  Treatments may be given even if you do not want them  An advance directive may make it easier for your family to make difficult choices about your care  Weight Management   Why it is important to manage your weight:  Being overweight increases your risk of health conditions such as heart disease, high blood pressure, type 2 diabetes, and certain types of cancer  It can also increase your risk for osteoarthritis, sleep apnea, and other respiratory problems  Aim for a slow, steady weight loss  Even a small amount of weight loss can lower your risk of health problems    How to lose weight safely:  A safe and healthy way to lose weight is to eat fewer calories and get regular exercise  You can lose up about 1 pound a week by decreasing the number of calories you eat by 500 calories each day  Healthy meal plan for weight management:  A healthy meal plan includes a variety of foods, contains fewer calories, and helps you stay healthy  A healthy meal plan includes the following:  · Eat whole-grain foods more often  A healthy meal plan should contain fiber  Fiber is the part of grains, fruits, and vegetables that is not broken down by your body  Whole-grain foods are healthy and provide extra fiber in your diet  Some examples of whole-grain foods are whole-wheat breads and pastas, oatmeal, brown rice, and bulgur  · Eat a variety of vegetables every day  Include dark, leafy greens such as spinach, kale, madonna greens, and mustard greens  Eat yellow and orange vegetables such as carrots, sweet potatoes, and winter squash  · Eat a variety of fruits every day  Choose fresh or canned fruit (canned in its own juice or light syrup) instead of juice  Fruit juice has very little or no fiber  · Eat low-fat dairy foods  Drink fat-free (skim) milk or 1% milk  Eat fat-free yogurt and low-fat cottage cheese  Try low-fat cheeses such as mozzarella and other reduced-fat cheeses  · Choose meat and other protein foods that are low in fat  Choose beans or other legumes such as split peas or lentils  Choose fish, skinless poultry (chicken or turkey), or lean cuts of red meat (beef or pork)  Before you cook meat or poultry, cut off any visible fat  · Use less fat and oil  Try baking foods instead of frying them  Add less fat, such as margarine, sour cream, regular salad dressing and mayonnaise to foods  Eat fewer high-fat foods  Some examples of high-fat foods include french fries, doughnuts, ice cream, and cakes  · Eat fewer sweets  Limit foods and drinks that are high in sugar  This includes candy, cookies, regular soda, and sweetened drinks    Exercise:  Exercise at least 30 minutes per day on most days of the week  Some examples of exercise include walking, biking, dancing, and swimming  You can also fit in more physical activity by taking the stairs instead of the elevator or parking farther away from stores  Ask your healthcare provider about the best exercise plan for you  Alcohol Use and Your Health    Drinking too much can harm your health  Excessive alcohol use leads to about 88,000 death in the [place] each year, and shortens the life of those who diet by almost 30 years  Further, excessive drinking cost the economy $249 billion in 2010  Most excessive drinkers are not alcohol dependent  Excessive alcohol use has immediate effects that increase the risk of many harmful health conditions  These are most often the result of binge drinking  Over time, excessive alcohol use can lead to the development of chronic diseases and other series health problems  What is considered a "drink"? Excessive alcohol use includes:  · Binge Drinking: For women, 4 or more drinks consumed on one occasion  For men, 5 or more drinks consumed on one occasion  · Heavy Drinking: For women, 8 or more drinks per week  For men, 15 or more drinks per week  · Any alcohol used by pregnant women  · Any alcohol used by those under the age of 21 years    If you choose to drink, do so in moderation:  · Do not drink at all if you are under the age of 24, or if you are or may be pregnant, or have health problems that could be made worse by drinking    · For women, up to 1 drink per day  · For men, up to 2 drinks a day    No one should begin drinking or drink more frequently based on potential health benefits    Short-Term Health Risks:  · Injuries: motor vehicle crashes, falls, drownings, burns  · Violence: homicide, suicide, sexual assault, intimate partner violence  · Alcohol poisoning  · Reproductive health: risky sexual behaviors, unintended prengnacy, sexually transmitted diseases, miscarriage, stillbirth, fetal alcohol syndrome    Long-Term Health Risks:  · Chronic diseases: high blood pressure, heart disease, stroke, liver disease, digestive problems  · Cancers: breast, mouth and throat, liver, colon  · Learning and memory problems: dementia, poor school performance  · Mental health: depression, anxiety, insomnia  · Social problems: lost productivity, family problems, unemployment  · Alcohol dependence    For support and more information:  · Substance Abuse and [facility] , [address] [place]  Web Address: https://[employer]/    · Alcoholics Anonymous        Web Address: http://[employer]/    https://www cdc gov/alcohol/fact-sheets/alcohol-use htm     © 2449 [facility] 2018 Information is for End User's use only and may not be sold, redistributed or otherwise used for commercial purposes  All illustrations and images included in CareNotes® are the copyrighted property of [employer] , [employer]  or [address] gained 13 lb during the holidays do not gain any more weight try to lose a little bit or maintain the weight that you have    Your labs are stable

## 2022-03-24 ENCOUNTER — HOSPITAL ENCOUNTER (OUTPATIENT)
Dept: BONE DENSITY | Facility: IMAGING CENTER | Age: 87
Discharge: HOME | End: 2022-03-24
Payer: COMMERCIAL

## 2022-03-24 DIAGNOSIS — N18.30 STAGE 3 CHRONIC KIDNEY DISEASE, UNSPECIFIED WHETHER STAGE 3A OR 3B CKD (HCC): ICD-10-CM

## 2022-03-24 DIAGNOSIS — E55.9 VITAMIN D DEFICIENCY: ICD-10-CM

## 2022-03-24 DIAGNOSIS — M81.0 AGE RELATED OSTEOPOROSIS, UNSPECIFIED PATHOLOGICAL FRACTURE PRESENCE: ICD-10-CM

## 2022-03-24 PROCEDURE — 77080 DXA BONE DENSITY AXIAL: CPT

## 2022-04-04 DIAGNOSIS — K21.9 GASTROESOPHAGEAL REFLUX DISEASE WITHOUT ESOPHAGITIS: ICD-10-CM

## 2022-04-04 DIAGNOSIS — I10 ESSENTIAL HYPERTENSION: ICD-10-CM

## 2022-04-04 RX ORDER — AMLODIPINE BESYLATE 10 MG/1
10 TABLET ORAL DAILY
Qty: 90 TABLET | Refills: 1 | Status: SHIPPED | OUTPATIENT
Start: 2022-04-04 | End: 2022-07-03

## 2022-04-04 RX ORDER — OMEPRAZOLE 40 MG/1
40 CAPSULE, DELAYED RELEASE ORAL DAILY
Qty: 90 CAPSULE | Refills: 1 | Status: SHIPPED | OUTPATIENT
Start: 2022-04-04

## 2022-04-04 RX ORDER — INDAPAMIDE 2.5 MG/1
2.5 TABLET, FILM COATED ORAL DAILY
Qty: 90 TABLET | Refills: 1 | Status: SHIPPED | OUTPATIENT
Start: 2022-04-04

## 2022-05-09 DIAGNOSIS — I10 ESSENTIAL HYPERTENSION: ICD-10-CM

## 2022-05-09 DIAGNOSIS — K21.9 GASTROESOPHAGEAL REFLUX DISEASE WITHOUT ESOPHAGITIS: ICD-10-CM

## 2022-05-09 RX ORDER — FOLIC ACID 1 MG/1
1000 TABLET ORAL DAILY
Qty: 90 TABLET | Refills: 1 | Status: SHIPPED | OUTPATIENT
Start: 2022-05-09

## 2022-05-09 RX ORDER — BISOPROLOL FUMARATE 5 MG/1
5 TABLET ORAL DAILY
Qty: 90 TABLET | Refills: 1 | Status: SHIPPED | OUTPATIENT
Start: 2022-05-09

## 2022-06-04 LAB
ALBUMIN SERPL-MCNC: 4.5 G/DL (ref 3.6–4.6)
ALBUMIN/GLOB SERPL: 1.7 {RATIO} (ref 1.2–2.2)
ALP SERPL-CCNC: 72 IU/L (ref 44–121)
ALT SERPL-CCNC: 38 IU/L (ref 0–44)
AST SERPL-CCNC: 29 IU/L (ref 0–40)
BASOPHILS # BLD AUTO: 0.1 X10E3/UL (ref 0–0.2)
BASOPHILS NFR BLD AUTO: 1 %
BILIRUB SERPL-MCNC: 0.7 MG/DL (ref 0–1.2)
BUN SERPL-MCNC: 27 MG/DL (ref 8–27)
BUN/CREAT SERPL: 20 (ref 10–24)
CALCIUM SERPL-MCNC: 9.5 MG/DL (ref 8.6–10.2)
CHLORIDE SERPL-SCNC: 102 MMOL/L (ref 96–106)
CO2 SERPL-SCNC: 23 MMOL/L (ref 20–29)
CREAT SERPL-MCNC: 1.33 MG/DL (ref 0.76–1.27)
CRP SERPL-MCNC: 7 MG/L (ref 0–10)
EGFR: 52 ML/MIN/1.73
EOSINOPHIL # BLD AUTO: 0.3 X10E3/UL (ref 0–0.4)
EOSINOPHIL NFR BLD AUTO: 4 %
ERYTHROCYTE [DISTWIDTH] IN BLOOD BY AUTOMATED COUNT: 14.2 % (ref 11.6–15.4)
ERYTHROCYTE [SEDIMENTATION RATE] IN BLOOD BY WESTERGREN METHOD: 13 MM/HR (ref 0–30)
GLOBULIN SER-MCNC: 2.6 G/DL (ref 1.5–4.5)
GLUCOSE SERPL-MCNC: 106 MG/DL (ref 65–99)
HBA1C MFR BLD: 6.3 % (ref 4.8–5.6)
HCT VFR BLD AUTO: 39.6 % (ref 37.5–51)
HCV AB S/CO SERPL IA: <0.1 S/CO RATIO (ref 0–0.9)
HGB BLD-MCNC: 13.5 G/DL (ref 13–17.7)
IMM GRANULOCYTES # BLD: 0 X10E3/UL (ref 0–0.1)
IMM GRANULOCYTES NFR BLD: 0 %
IRON SATN MFR SERPL: 34 % (ref 15–55)
IRON SERPL-MCNC: 86 UG/DL (ref 38–169)
LYMPHOCYTES # BLD AUTO: 1.4 X10E3/UL (ref 0.7–3.1)
LYMPHOCYTES NFR BLD AUTO: 20 %
MCH RBC QN AUTO: 29.9 PG (ref 26.6–33)
MCHC RBC AUTO-ENTMCNC: 34.1 G/DL (ref 31.5–35.7)
MCV RBC AUTO: 88 FL (ref 79–97)
MONOCYTES # BLD AUTO: 1 X10E3/UL (ref 0.1–0.9)
MONOCYTES NFR BLD AUTO: 14 %
NEUTROPHILS # BLD AUTO: 4.4 X10E3/UL (ref 1.4–7)
NEUTROPHILS NFR BLD AUTO: 61 %
PLATELET # BLD AUTO: 157 X10E3/UL (ref 150–450)
POTASSIUM SERPL-SCNC: 4.4 MMOL/L (ref 3.5–5.2)
PROT SERPL-MCNC: 7.1 G/DL (ref 6–8.5)
RBC # BLD AUTO: 4.51 X10E6/UL (ref 4.14–5.8)
SL AMB T4, FREE (DIRECT): 1.28 NG/DL (ref 0.82–1.77)
SODIUM SERPL-SCNC: 138 MMOL/L (ref 134–144)
TIBC SERPL-MCNC: 250 UG/DL (ref 250–450)
TSH SERPL DL<=0.005 MIU/L-ACNC: 5.77 UIU/ML (ref 0.45–4.5)
UIBC SERPL-MCNC: 164 UG/DL (ref 111–343)
WBC # BLD AUTO: 7.1 X10E3/UL (ref 3.4–10.8)

## 2022-07-13 DIAGNOSIS — R35.0 BENIGN PROSTATIC HYPERPLASIA WITH URINARY FREQUENCY: ICD-10-CM

## 2022-07-13 DIAGNOSIS — E55.9 VITAMIN D DEFICIENCY: ICD-10-CM

## 2022-07-13 DIAGNOSIS — N40.1 BENIGN PROSTATIC HYPERPLASIA WITH URINARY FREQUENCY: ICD-10-CM

## 2022-07-13 DIAGNOSIS — I10 ESSENTIAL HYPERTENSION: ICD-10-CM

## 2022-07-13 RX ORDER — RAMIPRIL 10 MG/1
10 CAPSULE ORAL DAILY
Qty: 90 CAPSULE | Refills: 1 | Status: SHIPPED | OUTPATIENT
Start: 2022-07-13

## 2022-07-13 RX ORDER — TAMSULOSIN HYDROCHLORIDE 0.4 MG/1
0.4 CAPSULE ORAL DAILY
Qty: 30 CAPSULE | Refills: 4 | Status: SHIPPED | OUTPATIENT
Start: 2022-07-13

## 2022-07-14 ENCOUNTER — OFFICE VISIT (OUTPATIENT)
Dept: INTERNAL MEDICINE CLINIC | Facility: CLINIC | Age: 87
End: 2022-07-14
Payer: COMMERCIAL

## 2022-07-14 VITALS
WEIGHT: 198.2 LBS | DIASTOLIC BLOOD PRESSURE: 62 MMHG | HEIGHT: 66 IN | TEMPERATURE: 97.4 F | SYSTOLIC BLOOD PRESSURE: 120 MMHG | HEART RATE: 44 BPM | BODY MASS INDEX: 31.85 KG/M2

## 2022-07-14 DIAGNOSIS — I73.9 PERIPHERAL VASCULAR DISEASE (HCC): ICD-10-CM

## 2022-07-14 DIAGNOSIS — E03.8 SUBCLINICAL HYPOTHYROIDISM: ICD-10-CM

## 2022-07-14 DIAGNOSIS — M48.02 CERVICAL SPINAL STENOSIS: ICD-10-CM

## 2022-07-14 DIAGNOSIS — R09.89 DECREASED PEDAL PULSES: ICD-10-CM

## 2022-07-14 DIAGNOSIS — I10 ESSENTIAL HYPERTENSION: ICD-10-CM

## 2022-07-14 DIAGNOSIS — E55.9 VITAMIN D DEFICIENCY: ICD-10-CM

## 2022-07-14 DIAGNOSIS — I71.40 AAA (ABDOMINAL AORTIC ANEURYSM) WITHOUT RUPTURE: Chronic | ICD-10-CM

## 2022-07-14 DIAGNOSIS — E11.51 TYPE II DIABETES MELLITUS WITH PERIPHERAL CIRCULATORY DISORDER (HCC): Primary | ICD-10-CM

## 2022-07-14 DIAGNOSIS — D69.3 IDIOPATHIC THROMBOCYTOPENIC PURPURA (HCC): ICD-10-CM

## 2022-07-14 DIAGNOSIS — E78.00 PURE HYPERCHOLESTEROLEMIA: ICD-10-CM

## 2022-07-14 DIAGNOSIS — N18.30 STAGE 3 CHRONIC KIDNEY DISEASE, UNSPECIFIED WHETHER STAGE 3A OR 3B CKD (HCC): ICD-10-CM

## 2022-07-14 PROCEDURE — 3078F DIAST BP <80 MM HG: CPT | Performed by: INTERNAL MEDICINE

## 2022-07-14 PROCEDURE — 99214 OFFICE O/P EST MOD 30 MIN: CPT | Performed by: INTERNAL MEDICINE

## 2022-07-14 PROCEDURE — 1160F RVW MEDS BY RX/DR IN RCRD: CPT | Performed by: INTERNAL MEDICINE

## 2022-07-14 PROCEDURE — 3074F SYST BP LT 130 MM HG: CPT | Performed by: INTERNAL MEDICINE

## 2022-07-14 PROCEDURE — 3725F SCREEN DEPRESSION PERFORMED: CPT | Performed by: INTERNAL MEDICINE

## 2022-07-14 NOTE — PROGRESS NOTES
Assessment/Plan:      One diabetes well control no polyuria polydipsia he lost about 13 lb from the previous visit with behavior modification continue the same  2  Polymyalgia rheumatica being followed by Rheumatology initially had a prednisone increase because of increasing symptoms now he is feeling better the inflammatory markers are down there tapering down the prednisone  3  Blood pressure is very well control  On the following medications  Ramipril 10 mg  Bisoprolol 5 mg  Amlodipine 10 mg    Renal function is stable    Hyperlipidemia on atorvastatin 40 mg per day continue the same will check the lipid profile when he comes back    History abdominal aortic aneurysm and peripheral vascular disease does have follow-up with the vascular is  Subclinical hypothyroidism no indication for treatment the TSH is less than 10 will continue to monitor  History mariamar Hannah Garcia thrombocytopenic purpura platelet count is back to normal      When he was in daughter stone last year around December he has some chest pain he went to the ER the workup was unremarkable  Will check an EKG apical pulse was regular irregular to make sure he is not in atrial fib      EKG sinus bradycardia otherwise normal tracing good news    Labs were review      Results for orders placed or performed in visit on 06/03/22   CBC and differential   Result Value Ref Range    White Blood Cell Count 7 1 3 4 - 10 8 x10E3/uL    Red Blood Cell Count 4 51 4 14 - 5 80 x10E6/uL    Hemoglobin 13 5 13 0 - 17 7 g/dL    HCT 39 6 37 5 - 51 0 %    MCV 88 79 - 97 fL    MCH 29 9 26 6 - 33 0 pg    MCHC 34 1 31 5 - 35 7 g/dL    RDW 14 2 11 6 - 15 4 %    Platelet Count 448 961 - 450 x10E3/uL    Neutrophils 61 Not Estab  %    Lymphocytes 20 Not Estab  %    Monocytes 14 Not Estab  %    Eosinophils 4 Not Estab  %    Basophils PCT 1 Not Estab  %    Neutrophils (Absolute) 4 4 1 4 - 7 0 x10E3/uL    Lymphocytes (Absolute) 1 4 0 7 - 3 1 x10E3/uL    Monocytes (Absolute) No 1 0 (H) 0 1 - 0 9 x10E3/uL    Eosinophils (Absolute) 0 3 0 0 - 0 4 x10E3/uL    Basophils ABS 0 1 0 0 - 0 2 x10E3/uL    Immature Granulocytes 0 Not Estab  %    Immature Granulocytes (Absolute) 0 0 0 0 - 0 1 x10E3/uL   Comprehensive metabolic panel   Result Value Ref Range    Glucose, Random 106 (H) 65 - 99 mg/dL    BUN 27 8 - 27 mg/dL    Creatinine 1 33 (H) 0 76 - 1 27 mg/dL    eGFR 52 (L) >59 mL/min/1 73    SL AMB BUN/CREATININE RATIO 20 10 - 24    Sodium 138 134 - 144 mmol/L    Potassium 4 4 3 5 - 5 2 mmol/L    Chloride 102 96 - 106 mmol/L    CO2 23 20 - 29 mmol/L    CALCIUM 9 5 8 6 - 10 2 mg/dL    Protein, Total 7 1 6 0 - 8 5 g/dL    Albumin 4 5 3 6 - 4 6 g/dL    Globulin, Total 2 6 1 5 - 4 5 g/dL    Albumin/Globulin Ratio 1 7 1 2 - 2 2    TOTAL BILIRUBIN 0 7 0 0 - 1 2 mg/dL    Alk Phos Isoenzymes 72 44 - 121 IU/L    AST 29 0 - 40 IU/L    ALT 38 0 - 44 IU/L   TIBC   Result Value Ref Range    Total Iron Binding Capacity (TIBC) 250 250 - 450 ug/dL    UIBC 164 111 - 343 ug/dL    Iron, Serum 86 38 - 169 ug/dL    Iron Saturation 34 15 - 55 %   Hemoglobin A1c (w/out EAG)   Result Value Ref Range    Hemoglobin A1C 6 3 (H) 4 8 - 5 6 %   Hepatitis C antibody   Result Value Ref Range    HEP C AB <0 1 0 0 - 0 9 s/co ratio   TSH, 3rd generation with Free T4 reflex   Result Value Ref Range    TSH 5 770 (H) 0 450 - 4 500 uIU/mL   T4, free   Result Value Ref Range    T4,Free (Direct) 1 28 0 82 - 1 77 ng/dL   Sedimentation rate, automated   Result Value Ref Range    Sedimentation Rate 13 0 - 30 mm/hr   C-reactive protein   Result Value Ref Range    C-Reactive Protein, Quant 7 0 - 10 mg/L         No problem-specific Assessment & Plan notes found for this encounter         Diagnoses and all orders for this visit:    Type II diabetes mellitus with peripheral circulatory disorder ([facility] [address] )    Subclinical hypothyroidism    Essential hypertension    Peripheral vascular disease ([facility] [address] )    AAA (abdominal aortic aneurysm) without rupture ([facility] )    Idiopathic thrombocytopenic purpura ([facility] )    Stage 3 chronic kidney disease, unspecified whether stage 3a or 3b CKD ([place] [address] )    Pure hypercholesterolemia    Vitamin D deficiency    Cervical spinal stenosis          Subjective:      Patient ID: Karmen Torres is a 81 y o  male  No chief complaint on file          Current Outpatient Medications:     Cholecalciferol (Vitamin D3) 25 MCG (1000 UT) CAPS, Take 1 capsule Mon, Wed & Fri, Disp: 30 capsule, Rfl: 1    ramipril (ALTACE) 10 MG capsule, Take 1 capsule (10 mg total) by mouth daily, Disp: 90 capsule, Rfl: 1    tamsulosin (FLOMAX) 0 4 mg, Take 1 capsule (0 4 mg total) by mouth daily, Disp: 30 capsule, Rfl: 4    acetaminophen (TYLENOL) 325 mg tablet, Take 2 tablets (650 mg total) by mouth every 6 (six) hours as needed for mild pain (Patient not taking: Reported on 11/5/2021), Disp: , Rfl:     amLODIPine (NORVASC) 10 mg tablet, Take 1 tablet (10 mg total) by mouth daily, Disp: 90 tablet, Rfl: 1    Ascorbic Acid (VITAMIN C PO), Take 1,000 mg by mouth daily, Disp: , Rfl:     aspirin 81 MG tablet, Take 1 tablet by mouth daily, Disp: , Rfl:     atorvastatin (LIPITOR) 40 mg tablet, Take 1 tablet daily, Disp: 90 tablet, Rfl: 1    bisoprolol (ZEBETA) 5 mg tablet, Take 1 tablet (5 mg total) by mouth daily, Disp: 90 tablet, Rfl: 1    Blood Pressure KIT, by Does not apply route 2 (two) times a week, Disp: 1 each, Rfl: 0    clotrimazole-betamethasone (LOTRISONE) 1-0 05 % cream, apply topically to affected area twice a day (Patient not taking: Reported on 8/3/2021), Disp: 30 g, Rfl: 1    ferrous sulfate (FeroSul) 325 (65 Fe) mg tablet, Take one tablet weekly, Disp: 12 tablet, Rfl: 4    folic acid (FOLVITE) 1 mg tablet, Take 1 tablet (1,000 mcg total) by mouth daily, Disp: 90 tablet, Rfl: 1    glucose blood (TRUETRACK TEST) test strip, by In Vitro route 2 (two) times a day, Disp: , Rfl:     indapamide (LOZOL) 2 5 mg tablet, Take 1 tablet (2 5 mg total) by mouth daily, Disp: 90 tablet, Rfl: 1    ketoconazole (NIZORAL) 2 % cream, Apply topically daily (Patient not taking: Reported on 11/5/2021), Disp: 15 g, Rfl: 0    Lysine HCl 1000 MG TABS, Take 1,000 mg by mouth daily , Disp: , Rfl:     magnesium chloride-calcium (MAG-SR PLUS CALCIUM)  mg, Take 2 tablets by mouth daily, Disp: , Rfl:     multivitamin-minerals (CENTRUM ADULTS) tablet, Take 1 tablet by mouth daily, Disp: , Rfl:     omeprazole (PriLOSEC) 40 MG capsule, Take 1 capsule (40 mg total) by mouth daily, Disp: 90 capsule, Rfl: 1    predniSONE 1 MG TBEC, 1 mg daily (Patient taking differently: 2 mg daily), Disp: , Rfl:     HPI    The following portions of the patient's history were reviewed and updated as appropriate: allergies, current medications, past family history, past medical history, past social history, past surgical history and problem list     Review of Systems   Constitutional: Negative  Negative for activity change, appetite change, fatigue, fever and unexpected weight change  HENT: Negative for congestion, ear pain, hearing loss, mouth sores, postnasal drip, rhinorrhea, sore throat, trouble swallowing and voice change  Eyes: Negative for pain, redness and visual disturbance  Respiratory: Negative for cough, chest tightness, shortness of breath and wheezing  Cardiovascular: Negative for chest pain, palpitations and leg swelling  Gastrointestinal: Negative for abdominal distention, abdominal pain, blood in stool, constipation, diarrhea and nausea  Endocrine: Negative for cold intolerance, heat intolerance, polydipsia, polyphagia and polyuria  Genitourinary: Negative for difficulty urinating, dysuria, flank pain, frequency, hematuria and urgency  Musculoskeletal: Negative for arthralgias, back pain, gait problem, joint swelling and myalgias  Skin: Negative for color change and pallor     Neurological: Negative for dizziness, tremors, seizures, syncope, weakness, numbness and headaches  Hematological: Negative for adenopathy  Does not bruise/bleed easily  Psychiatric/Behavioral: Negative  Negative for sleep disturbance  The patient is not nervous/anxious  Objective:        Diabetic Foot Exam    Patient's shoes and socks removed  Right Foot/Ankle   Right Foot Inspection  Skin Exam: skin normal  Skin not intact, no dry skin, no warmth, no callus, no erythema, no maceration, no abnormal color, no pre-ulcer, no ulcer and no callus  Toe Exam: ROM and strength within normal limits  No swelling, no tenderness, erythema and  no right toe deformity    Sensory   Vibration: intact  Proprioception: intact  Monofilament testing: intact    Vascular  The right DP pulse is 1+  The right PT pulse is 2+  Left Foot/Ankle  Left Foot Inspection  Skin Exam: skin normal  Skin not intact, no dry skin, no warmth, no erythema, no maceration, normal color, no pre-ulcer, no ulcer and no callus  Toe Exam: ROM and strength within normal limits  No swelling, no tenderness, no erythema and no left toe deformity  Sensory   Vibration: intact  Proprioception: intact  Monofilament testing: intact    Vascular  The left DP pulse is 1+  The left PT pulse is 2+  Assign Risk Category  No deformity present  No loss of protective sensation  Weak pulses  Risk: 1      There were no vitals taken for this visit  Physical Exam  Vitals and nursing note reviewed  Constitutional:       Appearance: He is well-developed  HENT:      Head: Normocephalic  Right Ear: External ear normal       Left Ear: External ear normal       Nose: Nose normal       Mouth/Throat:      Pharynx: No oropharyngeal exudate  Eyes:      Conjunctiva/sclera: Conjunctivae normal       Pupils: Pupils are equal, round, and reactive to light  Neck:      Thyroid: No thyromegaly  Cardiovascular:      Rate and Rhythm: Normal rate and regular rhythm  Pulses: Pulses are weak             Dorsalis pedis pulses are 1+ on the right side and 1+ on the left side  Posterior tibial pulses are 2+ on the right side and 2+ on the left side  Heart sounds: Normal heart sounds  No murmur heard  No friction rub  No gallop  Comments: S1-S2 regular irregular  Extremities +1 edema no calf tenderness    Pulmonary:      Effort: Pulmonary effort is normal  No respiratory distress  Breath sounds: Normal breath sounds  No wheezing or rales  Comments: Lungs are clear no wheezing rales or rhonchi  Abdominal:      General: Bowel sounds are normal  There is no distension  Palpations: Abdomen is soft  There is no mass  Tenderness: There is no abdominal tenderness  There is no guarding or rebound  Musculoskeletal:         General: Normal range of motion  Cervical back: Normal range of motion and neck supple  Feet:      Right foot:      Skin integrity: No ulcer, skin breakdown, erythema, warmth, callus or dry skin  Left foot:      Skin integrity: No ulcer, skin breakdown, erythema, warmth, callus or dry skin  Lymphadenopathy:      Cervical: No cervical adenopathy  Skin:     General: Skin is warm and dry  Neurological:      Mental Status: He is alert and oriented to person, place, and time     Psychiatric:         Behavior: Behavior normal          Judgment: Judgment normal

## 2022-07-19 ENCOUNTER — HOSPITAL ENCOUNTER (OUTPATIENT)
Dept: NON INVASIVE DIAGNOSTICS | Facility: CLINIC | Age: 87
Discharge: HOME | End: 2022-07-19
Payer: COMMERCIAL

## 2022-07-19 DIAGNOSIS — R09.89 DECREASED PEDAL PULSES: ICD-10-CM

## 2022-07-19 DIAGNOSIS — I73.9 PERIPHERAL VASCULAR DISEASE (HCC): ICD-10-CM

## 2022-07-19 PROCEDURE — 93922 UPR/L XTREMITY ART 2 LEVELS: CPT | Performed by: SURGERY

## 2022-07-19 PROCEDURE — 93925 LOWER EXTREMITY STUDY: CPT | Performed by: SURGERY

## 2022-07-19 PROCEDURE — 93925 LOWER EXTREMITY STUDY: CPT

## 2022-07-19 PROCEDURE — 93923 UPR/LXTR ART STDY 3+ LVLS: CPT

## 2022-10-08 LAB
25(OH)D3+25(OH)D2 SERPL-MCNC: 47 NG/ML (ref 30–100)
ALBUMIN SERPL-MCNC: 4.3 G/DL (ref 3.6–4.6)
ALBUMIN/CREAT UR: 311 MG/G CREAT (ref 0–29)
ALBUMIN/GLOB SERPL: 1.8 {RATIO} (ref 1.2–2.2)
ALP SERPL-CCNC: 67 IU/L (ref 44–121)
ALT SERPL-CCNC: 41 IU/L (ref 0–44)
APPEARANCE UR: CLEAR
AST SERPL-CCNC: 33 IU/L (ref 0–40)
BACTERIA URNS QL MICRO: NORMAL
BASOPHILS # BLD AUTO: 0.1 X10E3/UL (ref 0–0.2)
BASOPHILS NFR BLD AUTO: 1 %
BILIRUB SERPL-MCNC: 0.8 MG/DL (ref 0–1.2)
BILIRUB UR QL STRIP: NEGATIVE
BUN SERPL-MCNC: 26 MG/DL (ref 8–27)
BUN/CREAT SERPL: 19 (ref 10–24)
CALCIUM SERPL-MCNC: 9.3 MG/DL (ref 8.6–10.2)
CASTS URNS QL MICRO: NORMAL /LPF
CHLORIDE SERPL-SCNC: 104 MMOL/L (ref 96–106)
CHOLEST SERPL-MCNC: 181 MG/DL (ref 100–199)
CO2 SERPL-SCNC: 24 MMOL/L (ref 20–29)
COLOR UR: YELLOW
CREAT SERPL-MCNC: 1.39 MG/DL (ref 0.76–1.27)
CREAT UR-MCNC: 76.7 MG/DL
CRP SERPL-MCNC: 1 MG/L (ref 0–10)
EGFR: 49 ML/MIN/1.73
EOSINOPHIL # BLD AUTO: 0.3 X10E3/UL (ref 0–0.4)
EOSINOPHIL NFR BLD AUTO: 6 %
EPI CELLS #/AREA URNS HPF: NORMAL /HPF (ref 0–10)
ERYTHROCYTE [DISTWIDTH] IN BLOOD BY AUTOMATED COUNT: 14.9 % (ref 11.6–15.4)
ERYTHROCYTE [SEDIMENTATION RATE] IN BLOOD BY WESTERGREN METHOD: 17 MM/HR (ref 0–30)
GLOBULIN SER-MCNC: 2.4 G/DL (ref 1.5–4.5)
GLUCOSE SERPL-MCNC: 108 MG/DL (ref 70–99)
GLUCOSE UR QL: NEGATIVE
HBA1C MFR BLD: 6.2 % (ref 4.8–5.6)
HCT VFR BLD AUTO: 38.9 % (ref 37.5–51)
HDLC SERPL-MCNC: 60 MG/DL
HGB BLD-MCNC: 13.1 G/DL (ref 13–17.7)
HGB UR QL STRIP: NEGATIVE
IMM GRANULOCYTES # BLD: 0 X10E3/UL (ref 0–0.1)
IMM GRANULOCYTES NFR BLD: 0 %
KETONES UR QL STRIP: NEGATIVE
LDLC SERPL CALC-MCNC: 102 MG/DL (ref 0–99)
LDLC/HDLC SERPL: 1.7 RATIO (ref 0–3.6)
LEUKOCYTE ESTERASE UR QL STRIP: NEGATIVE
LYMPHOCYTES # BLD AUTO: 1.6 X10E3/UL (ref 0.7–3.1)
LYMPHOCYTES NFR BLD AUTO: 31 %
MAGNESIUM SERPL-MCNC: 1.7 MG/DL (ref 1.6–2.3)
MCH RBC QN AUTO: 30.3 PG (ref 26.6–33)
MCHC RBC AUTO-ENTMCNC: 33.7 G/DL (ref 31.5–35.7)
MCV RBC AUTO: 90 FL (ref 79–97)
MICRO URNS: ABNORMAL
MICROALBUMIN UR-MCNC: 238.7 UG/ML
MONOCYTES # BLD AUTO: 0.6 X10E3/UL (ref 0.1–0.9)
MONOCYTES NFR BLD AUTO: 11 %
NEUTROPHILS # BLD AUTO: 2.5 X10E3/UL (ref 1.4–7)
NEUTROPHILS NFR BLD AUTO: 51 %
NITRITE UR QL STRIP: NEGATIVE
PH UR STRIP: 6 [PH] (ref 5–7.5)
PLATELET # BLD AUTO: 151 X10E3/UL (ref 150–450)
POTASSIUM SERPL-SCNC: 4.2 MMOL/L (ref 3.5–5.2)
PROT SERPL-MCNC: 6.7 G/DL (ref 6–8.5)
PROT UR QL STRIP: ABNORMAL
RBC # BLD AUTO: 4.32 X10E6/UL (ref 4.14–5.8)
RBC #/AREA URNS HPF: NORMAL /HPF (ref 0–2)
SL AMB T4, FREE (DIRECT): 1.19 NG/DL (ref 0.82–1.77)
SL AMB VLDL CHOLESTEROL CALC: 19 MG/DL (ref 5–40)
SODIUM SERPL-SCNC: 143 MMOL/L (ref 134–144)
SP GR UR: 1.01 (ref 1–1.03)
TRIGL SERPL-MCNC: 105 MG/DL (ref 0–149)
TSH SERPL DL<=0.005 MIU/L-ACNC: 5.97 UIU/ML (ref 0.45–4.5)
UROBILINOGEN UR STRIP-ACNC: 0.2 MG/DL (ref 0.2–1)
WBC # BLD AUTO: 5 X10E3/UL (ref 3.4–10.8)
WBC #/AREA URNS HPF: NORMAL /HPF (ref 0–5)

## 2022-10-18 ENCOUNTER — OFFICE VISIT (OUTPATIENT)
Dept: INTERNAL MEDICINE CLINIC | Facility: CLINIC | Age: 87
End: 2022-10-18
Payer: COMMERCIAL

## 2022-10-18 VITALS
SYSTOLIC BLOOD PRESSURE: 130 MMHG | TEMPERATURE: 97.7 F | WEIGHT: 203 LBS | HEIGHT: 66 IN | DIASTOLIC BLOOD PRESSURE: 80 MMHG | HEART RATE: 80 BPM | RESPIRATION RATE: 14 BRPM | BODY MASS INDEX: 32.62 KG/M2

## 2022-10-18 DIAGNOSIS — I10 ESSENTIAL HYPERTENSION: Primary | ICD-10-CM

## 2022-10-18 DIAGNOSIS — E03.8 SUBCLINICAL HYPOTHYROIDISM: ICD-10-CM

## 2022-10-18 DIAGNOSIS — E78.00 PURE HYPERCHOLESTEROLEMIA: ICD-10-CM

## 2022-10-18 DIAGNOSIS — M35.3 POLYMYALGIA RHEUMATICA (HCC): ICD-10-CM

## 2022-10-18 DIAGNOSIS — N18.30 STAGE 3 CHRONIC KIDNEY DISEASE, UNSPECIFIED WHETHER STAGE 3A OR 3B CKD (HCC): ICD-10-CM

## 2022-10-18 DIAGNOSIS — E11.51 TYPE II DIABETES MELLITUS WITH PERIPHERAL CIRCULATORY DISORDER (HCC): ICD-10-CM

## 2022-10-18 DIAGNOSIS — I73.9 PERIPHERAL VASCULAR DISEASE (HCC): ICD-10-CM

## 2022-10-18 DIAGNOSIS — Z12.11 SCREENING FOR COLORECTAL CANCER: ICD-10-CM

## 2022-10-18 DIAGNOSIS — Z23 ENCOUNTER FOR IMMUNIZATION: ICD-10-CM

## 2022-10-18 DIAGNOSIS — Z12.12 SCREENING FOR COLORECTAL CANCER: ICD-10-CM

## 2022-10-18 DIAGNOSIS — R21 RASH: ICD-10-CM

## 2022-10-18 PROCEDURE — G0008 ADMIN INFLUENZA VIRUS VAC: HCPCS | Performed by: INTERNAL MEDICINE

## 2022-10-18 PROCEDURE — 90662 IIV NO PRSV INCREASED AG IM: CPT | Performed by: INTERNAL MEDICINE

## 2022-10-18 PROCEDURE — 99214 OFFICE O/P EST MOD 30 MIN: CPT | Performed by: INTERNAL MEDICINE

## 2022-10-18 RX ORDER — KETOCONAZOLE 20 MG/G
CREAM TOPICAL DAILY
Qty: 30 G | Refills: 1 | Status: SHIPPED | OUTPATIENT
Start: 2022-10-18

## 2022-10-18 NOTE — PROGRESS NOTES
Assessment/Plan:      1  Diabetes excellent control no polyuria polydipsia he lost some weight no change in medication or plan  2  Blood pressure is also well control on multiple medications disease denies any chest pain palpitation shortness of breath or headache he is on the following  Amlodipine 10 mg  Ramipril 10 mg  Indapamide 2 5 mg  Bisoprolol 5 mg daily    3  Hyperlipidemia on atorvastatin 40 mg per day no myalgias no change in medication or plan    4  Polymyalgia rheumatica her inflammatory markers are down he is taking prednisone 1 mg 2 and half tablets per day at the direction of his rheumatologist he feels fine  Anemia resolved he is on folic acid and iron will check the iron levels when he comes back      Thrombocytopenia has resolved on the CBC    Peripheral vascular disease is stable no claudications    Chronic renal failure is stable and proteinuria has improved continue the Ace inhibitors    Labs review    Results for orders placed or performed in visit on 10/07/22   Tanika Zamudio Default   Result Value Ref Range    White Blood Cell Count 5 0 3 4 - 10 8 x10E3/uL    Red Blood Cell Count 4 32 4 14 - 5 80 x10E6/uL    Hemoglobin 13 1 13 0 - 17 7 g/dL    HCT 38 9 37 5 - 51 0 %    MCV 90 79 - 97 fL    MCH 30 3 26 6 - 33 0 pg    MCHC 33 7 31 5 - 35 7 g/dL    RDW 14 9 11 6 - 15 4 %    Platelet Count 877 330 - 450 x10E3/uL    Neutrophils 51 Not Estab  %    Lymphocytes 31 Not Estab  %    Monocytes 11 Not Estab  %    Eosinophils 6 Not Estab  %    Basophils PCT 1 Not Estab  %    Neutrophils (Absolute) 2 5 1 4 - 7 0 x10E3/uL    Lymphocytes (Absolute) 1 6 0 7 - 3 1 x10E3/uL    Monocytes (Absolute) 0 6 0 1 - 0 9 x10E3/uL    Eosinophils (Absolute) 0 3 0 0 - 0 4 x10E3/uL    Basophils ABS 0 1 0 0 - 0 2 x10E3/uL    Immature Granulocytes 0 Not Estab  %    Immature Granulocytes (Absolute) 0 0 0 0 - 0 1 x10E3/uL   Comprehensive metabolic panel   Result Value Ref Range    Glucose, Random 108 (H) 70 - 99 mg/dL BUN 26 8 - 27 mg/dL    Creatinine 1 39 (H) 0 76 - 1 27 mg/dL    eGFR 49 (L) >59 mL/min/1 73    SL AMB BUN/CREATININE RATIO 19 10 - 24    Sodium 143 134 - 144 mmol/L    Potassium 4 2 3 5 - 5 2 mmol/L    Chloride 104 96 - 106 mmol/L    CO2 24 20 - 29 mmol/L    CALCIUM 9 3 8 6 - 10 2 mg/dL    Protein, Total 6 7 6 0 - 8 5 g/dL    Albumin 4 3 3 6 - 4 6 g/dL    Globulin, Total 2 4 1 5 - 4 5 g/dL    Albumin/Globulin Ratio 1 8 1 2 - 2 2    TOTAL BILIRUBIN 0 8 0 0 - 1 2 mg/dL    Alk Phos Isoenzymes 67 44 - 121 IU/L    AST 33 0 - 40 IU/L    ALT 41 0 - 44 IU/L   Urinalysis with microscopic   Result Value Ref Range    Specific Gravity 1 014 1 005 - 1 030    Ph 6 0 5 0 - 7 5    Color UA Yellow Yellow    Urine Appearance Clear Clear    Leukocyte Esterase Negative Negative    Protein 1+ (A) Negative/Trace    Glucose, 24 HR Urine Negative Negative    Ketone, Urine Negative Negative    Blood, Urine Negative Negative    Bilirubin, Urine Negative Negative    Urobilinogen Urine 0 2 0 2 - 1 0 mg/dL    SL AMB NITRITES URINE, QUAL  Negative Negative    Microscopic Examination See below:    Microscopic Examination   Result Value Ref Range    SL AMB WBC, URINE None seen 0 - 5 /hpf    RBC, Urine None seen 0 - 2 /hpf    Epithelial Cells (non renal) None seen 0 - 10 /hpf    Casts None seen None seen /lpf    Bacteria, Urine Few None seen/Few   LIPID PANEL + LDL/HDL RATIO   Result Value Ref Range    Cholesterol, Total 181 100 - 199 mg/dL    Triglycerides 105 0 - 149 mg/dL    HDL 60 >39 mg/dL    VLDL Cholesterol Calculated 19 5 - 40 mg/dL    LDL Calculated 102 (H) 0 - 99 mg/dL    LDl/HDL Ratio 1 7 0 0 - 3 6 ratio   Microalbumin / creatinine urine ratio   Result Value Ref Range    Creatinine, Urine 76 7 Not Estab  mg/dL    Microalbum  ,U,Random 238 7 Not Estab  ug/mL    Microalb/Creat Ratio 311 (H) 0 - 29 mg/g creat   Hemoglobin A1c (w/out EAG)   Result Value Ref Range    Hemoglobin A1C 6 2 (H) 4 8 - 5 6 %   Vitamin D 25 hydroxy   Result Value Ref Range    25-HYDROXY VIT D 47 0 30 0 - 100 0 ng/mL   TSH, 3rd generation with Free T4 reflex   Result Value Ref Range    TSH 5 970 (H) 0 450 - 4 500 uIU/mL   T4, free   Result Value Ref Range    T4,Free (Direct) 1 19 0 82 - 1 77 ng/dL   Sedimentation rate, automated   Result Value Ref Range    Sedimentation Rate 17 0 - 30 mm/hr   Magnesium   Result Value Ref Range    Magnesium, Serum 1 7 1 6 - 2 3 mg/dL   C-reactive protein   Result Value Ref Range    C-Reactive Protein, Quant 1 0 - 10 mg/L           No problem-specific Assessment & Plan notes found for this encounter  Diagnoses and all orders for this visit:    Essential hypertension    Peripheral vascular disease ([facility] [address] )    Type II diabetes mellitus with peripheral circulatory disorder ([facility] [address] )    Subclinical hypothyroidism    Stage 3 chronic kidney disease, unspecified whether stage 3a or 3b CKD ([facility])    Pure hypercholesterolemia    Polymyalgia rheumatica ([facility])          Subjective:      Patient ID: Lizeth Odonnell is a 81 y o  male  No chief complaint on file          Current Outpatient Medications:   •  Cholecalciferol (Vitamin D3) 25 MCG (1000 UT) CAPS, Take 1 capsule Mon, Wed & Fri, Disp: 30 capsule, Rfl: 1  •  ramipril (ALTACE) 10 MG capsule, Take 1 capsule (10 mg total) by mouth daily, Disp: 90 capsule, Rfl: 1  •  tamsulosin (FLOMAX) 0 4 mg, Take 1 capsule (0 4 mg total) by mouth daily, Disp: 30 capsule, Rfl: 4  •  acetaminophen (TYLENOL) 325 mg tablet, Take 2 tablets (650 mg total) by mouth every 6 (six) hours as needed for mild pain (Patient not taking: No sig reported), Disp: , Rfl:   •  amLODIPine (NORVASC) 10 mg tablet, Take 1 tablet (10 mg total) by mouth daily, Disp: 90 tablet, Rfl: 1  •  Ascorbic Acid (VITAMIN C PO), Take 1,000 mg by mouth daily, Disp: , Rfl:   •  aspirin 81 MG tablet, Take 1 tablet by mouth daily, Disp: , Rfl:   •  atorvastatin (LIPITOR) 40 mg tablet, Take 1 tablet daily, Disp: 90 tablet, Rfl: 1  •  bisoprolol (ZEBETA) 5 mg tablet, Take 1 tablet (5 mg total) by mouth daily, Disp: 90 tablet, Rfl: 1  •  Blood Pressure KIT, by Does not apply route 2 (two) times a week, Disp: 1 each, Rfl: 0  •  clotrimazole-betamethasone (LOTRISONE) 1-0 05 % cream, apply topically to affected area twice a day (Patient not taking: No sig reported), Disp: 30 g, Rfl: 1  •  ferrous sulfate (FeroSul) 325 (65 Fe) mg tablet, Take one tablet weekly, Disp: 12 tablet, Rfl: 4  •  folic acid (FOLVITE) 1 mg tablet, Take 1 tablet (1,000 mcg total) by mouth daily, Disp: 90 tablet, Rfl: 1  •  glucose blood test strip, by In Vitro route 2 (two) times a day, Disp: , Rfl:   •  indapamide (LOZOL) 2 5 mg tablet, Take 1 tablet (2 5 mg total) by mouth daily, Disp: 90 tablet, Rfl: 1  •  ketoconazole (NIZORAL) 2 % cream, Apply topically daily (Patient not taking: No sig reported), Disp: 15 g, Rfl: 0  •  Lysine HCl 1000 MG TABS, Take 1,000 mg by mouth daily , Disp: , Rfl:   •  magnesium chloride-calcium (MAG-SR PLUS CALCIUM)  mg, Take 2 tablets by mouth daily, Disp: , Rfl:   •  multivitamin-minerals (CENTRUM ADULTS) tablet, Take 1 tablet by mouth daily, Disp: , Rfl:   •  omeprazole (PriLOSEC) 40 MG capsule, Take 1 capsule (40 mg total) by mouth daily, Disp: 90 capsule, Rfl: 1  •  predniSONE 1 MG TBEC, 1 mg daily (Patient taking differently: 2 mg daily), Disp: , Rfl:     HPI    The following portions of the patient's history were reviewed and updated as appropriate: allergies, current medications, past family history, past medical history, past social history, past surgical history and problem list     Review of Systems   Constitutional: Negative  Negative for activity change, appetite change, fatigue, fever and unexpected weight change  HENT: Negative for congestion, ear pain, hearing loss, mouth sores, postnasal drip, rhinorrhea, sore throat, trouble swallowing and voice change  Eyes: Negative for pain, redness and visual disturbance     Respiratory: Negative for cough, chest tightness, shortness of breath and wheezing  Cardiovascular: Negative for chest pain, palpitations and leg swelling  Gastrointestinal: Negative for abdominal distention, abdominal pain, blood in stool, constipation, diarrhea and nausea  Endocrine: Negative for cold intolerance, heat intolerance, polydipsia, polyphagia and polyuria  Genitourinary: Negative for difficulty urinating, dysuria, flank pain, frequency, hematuria and urgency  Musculoskeletal: Negative for arthralgias, back pain, gait problem, joint swelling and myalgias  Skin: Positive for rash  Negative for color change and pallor  Neurological: Negative for dizziness, tremors, seizures, syncope, weakness, numbness and headaches  Hematological: Negative for adenopathy  Does not bruise/bleed easily  Psychiatric/Behavioral: Negative  Negative for sleep disturbance  The patient is not nervous/anxious  Objective: There were no vitals taken for this visit  Physical Exam  Constitutional:       Appearance: He is well-developed  HENT:      Head: Normocephalic  Right Ear: External ear normal       Left Ear: External ear normal       Nose: Nose normal       Mouth/Throat:      Pharynx: No oropharyngeal exudate  Eyes:      Conjunctiva/sclera: Conjunctivae normal       Pupils: Pupils are equal, round, and reactive to light  Neck:      Thyroid: No thyromegaly  Cardiovascular:      Rate and Rhythm: Normal rate and regular rhythm  Heart sounds: Normal heart sounds  No murmur heard  No friction rub  No gallop  Comments: S1-S2 regular rhythm  Extremities no edema    Pulmonary:      Effort: Pulmonary effort is normal  No respiratory distress  Breath sounds: Normal breath sounds  No wheezing or rales  Comments: Lungs are clear no wheezing rales or rhonchi  Abdominal:      General: Bowel sounds are normal  There is no distension  Palpations: Abdomen is soft  There is no mass  Tenderness:  There is no abdominal tenderness  There is no guarding or rebound  Comments: Abdomen obese soft nontender   Musculoskeletal:         General: Normal range of motion  Cervical back: Normal range of motion and neck supple  Lymphadenopathy:      Cervical: No cervical adenopathy  Skin:     General: Skin is warm and dry  Findings: Rash present  Comments: Rash in the groin area bilaterally consistent with a fungal i infection   Neurological:      Mental Status: He is alert and oriented to person, place, and time     Psychiatric:         Behavior: Behavior normal          Judgment: Judgment normal

## 2022-10-18 NOTE — PATIENT INSTRUCTIONS
Use your anti fungal cream once a day to the groin area    No change in medications    Your labs are stable    Check her iron levels with the next labs

## 2022-10-26 DIAGNOSIS — E78.00 PURE HYPERCHOLESTEROLEMIA: ICD-10-CM

## 2022-10-26 DIAGNOSIS — I10 ESSENTIAL HYPERTENSION: ICD-10-CM

## 2022-10-26 DIAGNOSIS — K21.9 GASTROESOPHAGEAL REFLUX DISEASE WITHOUT ESOPHAGITIS: ICD-10-CM

## 2022-10-26 RX ORDER — ATORVASTATIN CALCIUM 40 MG/1
TABLET, FILM COATED ORAL
Qty: 90 TABLET | Refills: 1 | Status: SHIPPED | OUTPATIENT
Start: 2022-10-26

## 2022-10-26 RX ORDER — FOLIC ACID 1 MG/1
1000 TABLET ORAL DAILY
Qty: 90 TABLET | Refills: 1 | Status: SHIPPED | OUTPATIENT
Start: 2022-10-26

## 2022-10-26 RX ORDER — AMLODIPINE BESYLATE 10 MG/1
10 TABLET ORAL DAILY
Qty: 90 TABLET | Refills: 1 | Status: SHIPPED | OUTPATIENT
Start: 2022-10-26 | End: 2023-01-24

## 2022-10-26 RX ORDER — OMEPRAZOLE 40 MG/1
40 CAPSULE, DELAYED RELEASE ORAL DAILY
Qty: 90 CAPSULE | Refills: 1 | Status: SHIPPED | OUTPATIENT
Start: 2022-10-26

## 2022-10-26 RX ORDER — INDAPAMIDE 2.5 MG/1
2.5 TABLET, FILM COATED ORAL DAILY
Qty: 90 TABLET | Refills: 1 | Status: SHIPPED | OUTPATIENT
Start: 2022-10-26

## 2022-11-07 LAB
LEFT EYE DIABETIC RETINOPATHY: NORMAL
RIGHT EYE DIABETIC RETINOPATHY: NORMAL

## 2022-11-16 ENCOUNTER — TELEPHONE (OUTPATIENT)
Dept: INTERNAL MEDICINE CLINIC | Facility: CLINIC | Age: 87
End: 2022-11-16

## 2022-11-16 DIAGNOSIS — B35.6 TINEA CRURIS: Primary | ICD-10-CM

## 2022-11-16 RX ORDER — FLUCONAZOLE 100 MG/1
TABLET ORAL
Qty: 5 TABLET | Refills: 0 | Status: SHIPPED | OUTPATIENT
Start: 2022-11-16 | End: 2022-11-21

## 2022-11-16 NOTE — TELEPHONE ENCOUNTER
C/o jock itch since early October  He was given Ketoconazole cream at his OV on 10 18 22  It has not helped  As per Dr Duane Leo, Rx was sent for Fluconazole 100 mg daily x 5 days

## 2022-11-29 DIAGNOSIS — R21 RASH: Primary | ICD-10-CM

## 2022-11-29 NOTE — PROGRESS NOTES
Refer to Urology and Dermatology difficult rash to treated we try fungal cream nizoral and fluconazole

## 2022-12-01 DIAGNOSIS — I10 ESSENTIAL HYPERTENSION: ICD-10-CM

## 2022-12-01 RX ORDER — BISOPROLOL FUMARATE 5 MG/1
5 TABLET, FILM COATED ORAL DAILY
Qty: 90 TABLET | Refills: 0 | Status: SHIPPED | OUTPATIENT
Start: 2022-12-01

## 2022-12-21 ENCOUNTER — TELEMEDICINE (OUTPATIENT)
Dept: INTERNAL MEDICINE CLINIC | Facility: CLINIC | Age: 87
End: 2022-12-21

## 2022-12-21 DIAGNOSIS — D69.3 IDIOPATHIC THROMBOCYTOPENIC PURPURA (HCC): ICD-10-CM

## 2022-12-21 DIAGNOSIS — U07.1 COVID-19: Primary | ICD-10-CM

## 2022-12-21 DIAGNOSIS — I10 ESSENTIAL HYPERTENSION: ICD-10-CM

## 2022-12-21 DIAGNOSIS — M35.3 POLYMYALGIA RHEUMATICA (HCC): ICD-10-CM

## 2022-12-21 DIAGNOSIS — N18.30 STAGE 3 CHRONIC KIDNEY DISEASE, UNSPECIFIED WHETHER STAGE 3A OR 3B CKD (HCC): ICD-10-CM

## 2022-12-21 DIAGNOSIS — E11.51 TYPE II DIABETES MELLITUS WITH PERIPHERAL CIRCULATORY DISORDER (HCC): ICD-10-CM

## 2022-12-21 NOTE — PROGRESS NOTES
COVID-19 Outpatient Progress Note    Assessment/Plan: I was able to see the patient on video but I was not able to do audio  He was home lethargic delirious I told the wife to hang up and take him immediately to the emergency room call 911 he tested positive for COVID-19 today  He does have comorbidities  Problem List Items Addressed This Visit    None     Disposition:     I have spent 10 minutes directly with the patient  Greater than 50% of this time was spent in counseling/coordination of care regarding: prognosis, risks and benefits of treatment options, instructions for management, patient and family education, importance of treatment compliance, risk factor reductions and impressions  Emergency room for further evaluation      Encounter provider: Arjun Barajas MD     Provider located at: [facility] [id number]     Recent Visits  No visits were found meeting these conditions  Showing recent visits within past 7 days and meeting all other requirements  Future Appointments  No visits were found meeting these conditions  Showing future appointments within next 150 days and meeting all other requirements     This virtual check-in was done via [employer] and patient was informed that this is a secure, HIPAA-compliant platform  He agrees to proceed  Patient agrees to participate in a virtual check in via telephone or video visit instead of presenting to the office to address urgent/immediate medical needs  Patient is aware this is a billable service  He acknowledged consent and understanding of privacy and security of the video platform  The patient has agreed to participate and understands they can discontinue the visit at any time  After connecting through [facility], the patient was identified by name and date of birth   Janine Wagoner was informed that this was a telemedicine visit and that the exam was being conducted confidentially over secure lines  My office door was closed  No one else was in the room  Peggy Vigil acknowledged consent and understanding of privacy and security of the telemedicine visit  I informed the patient that I have reviewed his record in Epic and presented the opportunity for him to ask any questions regarding the visit today  The patient agreed to participate  Verification of patient location:  Patient is located in the following state in which I hold an active license: [place]    Subjective:   Peggy Vigil is a 81 y o  male who is concerned about COVID-19  Patient's symptoms include fever, fatigue and sore throat  Patient denies chills, malaise, congestion, rhinorrhea, anosmia, loss of taste, cough, shortness of breath, chest tightness, abdominal pain, nausea, vomiting, diarrhea, myalgias and headaches  COVID-19 vaccination status: Fully vaccinated (primary series)    Exposure:   Contact with a person who is under investigation (PUI) for or who is positive for COVID-19 within the last 14 days?: No    Hospitalized recently for fever and/or lower respiratory symptoms?: No      Currently a healthcare worker that is involved in direct patient care?: No      Works in a special setting where the risk of COVID-19 transmission may be high? (this may include long-term care, correctional and residential facilities; homeless shelters; assisted-living facilities and group homes ): No      Resident in a special setting where the risk of COVID-19 transmission may be high? (this may include long-term care, correctional and residential facilities; homeless shelters; assisted-living facilities and group homes ): No      Start to get a full history because I was not able to communicate with him I did talk to the wife over the phone    I  Saw him so cholesterol and confused when he called earlier that I think he needs to go to the emergency room for further evaluation he is COVID-19 positive with comorbidities including coronary artery disease hypertension    No results found for: Nelli Patino, SARSCORONAVI, CORONAVIRUSR, SARSCOVAG, [facility]    Review of Systems   Constitutional: Positive for fatigue and fever  Negative for activity change, appetite change, chills and unexpected weight change  HENT: Positive for sore throat  Negative for congestion, ear pain, hearing loss, mouth sores, postnasal drip, rhinorrhea, trouble swallowing and voice change  Eyes: Negative for pain, redness and visual disturbance  Respiratory: Negative for cough, chest tightness, shortness of breath and wheezing  Cardiovascular: Negative for chest pain, palpitations and leg swelling  Gastrointestinal: Negative for abdominal distention, abdominal pain, blood in stool, constipation, diarrhea, nausea and vomiting  Endocrine: Negative for cold intolerance, heat intolerance, polydipsia, polyphagia and polyuria  Genitourinary: Negative for difficulty urinating, dysuria, flank pain, frequency, hematuria and urgency  Musculoskeletal: Negative for arthralgias, back pain, gait problem, joint swelling and myalgias  Skin: Negative for color change and pallor  Neurological: Negative for dizziness, tremors, seizures, syncope, weakness, numbness and headaches  Hematological: Negative for adenopathy  Does not bruise/bleed easily  Psychiatric/Behavioral: Negative  Negative for sleep disturbance  The patient is not nervous/anxious        Current Outpatient Medications on File Prior to Visit   Medication Sig   • acetaminophen (TYLENOL) 325 mg tablet Take 2 tablets (650 mg total) by mouth every 6 (six) hours as needed for mild pain (Patient not taking: No sig reported)   • amLODIPine (NORVASC) 10 mg tablet Take 1 tablet (10 mg total) by mouth daily   • Ascorbic Acid (VITAMIN C PO) Take 1,000 mg by mouth daily   • aspirin 81 MG tablet Take 1 tablet by mouth daily   • atorvastatin (LIPITOR) 40 mg tablet Take 1 tablet daily   • betamethasone valerate (VALISONE) 0 1 % cream Apply topically 2 (two) times a day   • bisoprolol (ZEBETA) 5 mg tablet Take 1 tablet (5 mg total) by mouth daily   • Blood Pressure KIT by Does not apply route 2 (two) times a week   • Cholecalciferol (Vitamin D3) 25 MCG (1000 UT) CAPS Take 1 capsule Mon, Wed & Fri   • clotrimazole-betamethasone (LOTRISONE) 1-0 05 % cream apply topically to affected area twice a day (Patient not taking: No sig reported)   • ferrous sulfate (FeroSul) 325 (65 Fe) mg tablet Take one tablet weekly   • folic acid (FOLVITE) 1 mg tablet Take 1 tablet (1,000 mcg total) by mouth daily   • glucose blood test strip by In Vitro route 2 (two) times a day   • indapamide (LOZOL) 2 5 mg tablet Take 1 tablet (2 5 mg total) by mouth daily   • ketoconazole (NIZORAL) 2 % cream Apply topically daily   • Lysine HCl 1000 MG TABS Take 1,000 mg by mouth daily    • magnesium chloride-calcium (MAG-SR PLUS CALCIUM)  mg Take 2 tablets by mouth daily   • multivitamin-minerals (CENTRUM ADULTS) tablet Take 1 tablet by mouth daily   • omeprazole (PriLOSEC) 40 MG capsule Take 1 capsule (40 mg total) by mouth daily   • predniSONE 1 MG TBEC 1 mg daily (Patient taking differently: 2 mg daily)   • ramipril (ALTACE) 10 MG capsule Take 1 capsule (10 mg total) by mouth daily   • tamsulosin (FLOMAX) 0 4 mg Take 1 capsule (0 4 mg total) by mouth daily       Objective: There were no vitals taken for this visit  Physical Exam  Constitutional:       General: He is in acute distress  Appearance: He is ill-appearing and toxic-appearing  Neurological:      Mental Status: He is disoriented         William Centeno MD

## 2023-01-05 DIAGNOSIS — R35.0 BENIGN PROSTATIC HYPERPLASIA WITH URINARY FREQUENCY: ICD-10-CM

## 2023-01-05 DIAGNOSIS — N40.1 BENIGN PROSTATIC HYPERPLASIA WITH URINARY FREQUENCY: ICD-10-CM

## 2023-01-05 DIAGNOSIS — E55.9 VITAMIN D DEFICIENCY: ICD-10-CM

## 2023-01-05 RX ORDER — TAMSULOSIN HYDROCHLORIDE 0.4 MG/1
0.4 CAPSULE ORAL DAILY
Qty: 30 CAPSULE | Refills: 4 | Status: SHIPPED | OUTPATIENT
Start: 2023-01-05

## 2023-01-20 DIAGNOSIS — I10 ESSENTIAL HYPERTENSION: ICD-10-CM

## 2023-01-20 RX ORDER — RAMIPRIL 10 MG/1
10 CAPSULE ORAL DAILY
Qty: 90 CAPSULE | Refills: 1 | Status: SHIPPED | OUTPATIENT
Start: 2023-01-20

## 2023-02-14 LAB
ALBUMIN SERPL-MCNC: 4.4 G/DL (ref 3.6–4.6)
ALBUMIN/GLOB SERPL: 2.1 {RATIO} (ref 1.2–2.2)
ALP SERPL-CCNC: 65 IU/L (ref 44–121)
ALT SERPL-CCNC: 32 IU/L (ref 0–44)
AST SERPL-CCNC: 30 IU/L (ref 0–40)
BASOPHILS # BLD AUTO: 0 X10E3/UL (ref 0–0.2)
BASOPHILS NFR BLD AUTO: 1 %
BILIRUB SERPL-MCNC: 0.4 MG/DL (ref 0–1.2)
BUN SERPL-MCNC: 35 MG/DL (ref 8–27)
BUN/CREAT SERPL: 21 (ref 10–24)
CALCIUM SERPL-MCNC: 9.6 MG/DL (ref 8.6–10.2)
CHLORIDE SERPL-SCNC: 105 MMOL/L (ref 96–106)
CO2 SERPL-SCNC: 22 MMOL/L (ref 20–29)
CREAT SERPL-MCNC: 1.69 MG/DL (ref 0.76–1.27)
EGFR: 39 ML/MIN/1.73
EOSINOPHIL # BLD AUTO: 0.2 X10E3/UL (ref 0–0.4)
EOSINOPHIL NFR BLD AUTO: 4 %
ERYTHROCYTE [DISTWIDTH] IN BLOOD BY AUTOMATED COUNT: 14.8 % (ref 11.6–15.4)
FERRITIN SERPL-MCNC: 498 NG/ML (ref 30–400)
GLOBULIN SER-MCNC: 2.1 G/DL (ref 1.5–4.5)
GLUCOSE SERPL-MCNC: 141 MG/DL (ref 70–99)
HBA1C MFR BLD: 6.6 % (ref 4.8–5.6)
HCT VFR BLD AUTO: 34.5 % (ref 37.5–51)
HGB BLD-MCNC: 11.7 G/DL (ref 13–17.7)
IMM GRANULOCYTES # BLD: 0.1 X10E3/UL (ref 0–0.1)
IMM GRANULOCYTES NFR BLD: 1 %
IRON SATN MFR SERPL: 30 % (ref 15–55)
IRON SERPL-MCNC: 74 UG/DL (ref 38–169)
LYMPHOCYTES # BLD AUTO: 1.2 X10E3/UL (ref 0.7–3.1)
LYMPHOCYTES NFR BLD AUTO: 23 %
MAGNESIUM SERPL-MCNC: 1.6 MG/DL (ref 1.6–2.3)
MCH RBC QN AUTO: 30.5 PG (ref 26.6–33)
MCHC RBC AUTO-ENTMCNC: 33.9 G/DL (ref 31.5–35.7)
MCV RBC AUTO: 90 FL (ref 79–97)
MONOCYTES # BLD AUTO: 0.5 X10E3/UL (ref 0.1–0.9)
MONOCYTES NFR BLD AUTO: 10 %
NEUTROPHILS # BLD AUTO: 3.3 X10E3/UL (ref 1.4–7)
NEUTROPHILS NFR BLD AUTO: 61 %
PLATELET # BLD AUTO: 180 X10E3/UL (ref 150–450)
POTASSIUM SERPL-SCNC: 4.3 MMOL/L (ref 3.5–5.2)
PROT SERPL-MCNC: 6.5 G/DL (ref 6–8.5)
RBC # BLD AUTO: 3.83 X10E6/UL (ref 4.14–5.8)
SODIUM SERPL-SCNC: 141 MMOL/L (ref 134–144)
TIBC SERPL-MCNC: 246 UG/DL (ref 250–450)
TSH SERPL DL<=0.005 MIU/L-ACNC: 4.47 UIU/ML (ref 0.45–4.5)
UIBC SERPL-MCNC: 172 UG/DL (ref 111–343)
WBC # BLD AUTO: 5.4 X10E3/UL (ref 3.4–10.8)

## 2023-02-21 ENCOUNTER — OFFICE VISIT (OUTPATIENT)
Dept: INTERNAL MEDICINE CLINIC | Facility: CLINIC | Age: 88
End: 2023-02-21

## 2023-02-21 VITALS
HEIGHT: 66 IN | DIASTOLIC BLOOD PRESSURE: 64 MMHG | BODY MASS INDEX: 32.14 KG/M2 | TEMPERATURE: 97.7 F | WEIGHT: 200 LBS | SYSTOLIC BLOOD PRESSURE: 130 MMHG | HEART RATE: 80 BPM | RESPIRATION RATE: 13 BRPM

## 2023-02-21 DIAGNOSIS — E78.00 PURE HYPERCHOLESTEROLEMIA: ICD-10-CM

## 2023-02-21 DIAGNOSIS — E11.51 TYPE II DIABETES MELLITUS WITH PERIPHERAL CIRCULATORY DISORDER (HCC): Primary | ICD-10-CM

## 2023-02-21 DIAGNOSIS — N18.30 STAGE 3 CHRONIC KIDNEY DISEASE, UNSPECIFIED WHETHER STAGE 3A OR 3B CKD (HCC): ICD-10-CM

## 2023-02-21 DIAGNOSIS — E03.8 SUBCLINICAL HYPOTHYROIDISM: ICD-10-CM

## 2023-02-21 DIAGNOSIS — I10 ESSENTIAL HYPERTENSION: ICD-10-CM

## 2023-02-21 DIAGNOSIS — E55.9 VITAMIN D DEFICIENCY: ICD-10-CM

## 2023-02-21 DIAGNOSIS — U07.1 COVID-19: ICD-10-CM

## 2023-02-21 DIAGNOSIS — Z12.11 SCREENING FOR COLORECTAL CANCER: ICD-10-CM

## 2023-02-21 DIAGNOSIS — Z12.12 SCREENING FOR COLORECTAL CANCER: ICD-10-CM

## 2023-02-21 DIAGNOSIS — M35.3 POLYMYALGIA RHEUMATICA (HCC): ICD-10-CM

## 2023-02-21 DIAGNOSIS — I73.9 PERIPHERAL VASCULAR DISEASE (HCC): ICD-10-CM

## 2023-02-21 DIAGNOSIS — D69.3 IDIOPATHIC THROMBOCYTOPENIC PURPURA (HCC): ICD-10-CM

## 2023-02-21 RX ORDER — PREDNISONE 1 MG/1
5 TABLET ORAL 2 TIMES DAILY WITH MEALS
Qty: 60 TABLET | Refills: 1 | Status: SHIPPED | OUTPATIENT
Start: 2023-02-21 | End: 2023-04-22

## 2023-02-21 NOTE — PATIENT INSTRUCTIONS
I gave you prednisone 5 mg twice a day for 1 week if you feel better you can start tapering off by 1 mg/week until you get to 5 mg    Blood pressure is controlled    Labs are stable we will check a CRP and sed rate with the next labs    We will see you back in 3 months

## 2023-02-21 NOTE — PROGRESS NOTES
Assessment/Plan:      #1 status post COVID-19 no residual symptoms he was admitted to the hospital for 3 days  He did not need to be intubated was given oxygen  Got an IV medication which I assume in my would have been Wednesday as severe  We will get the old records    2  Diabetes well controlled hemoglobin A1c is  No polyuria polydipsia  3   Blood pressure well controlled no chest palpitation shortness of breath he is on the following medications  Ramipril 10 mg  Bisoprolol 5 mg  Amlodipine 10 mg    4  Polymyalgia rheumatica is going to see the rheumatologist in a couple of weeks he up his prednisone a little bit I gave him prednisone 5 mg to take 5 mg twice a day for at least 1 week and then taper down  He has had some neck pain and hip pain and shoulder pain  Renal insufficiency a little worse most likely since the COVID  We will repeat lab work when he comes back    Anemia most likely inflammatory most likely from the residual COVID we will repeat the CBC    He wanted to know when I retire I told him but if it is burdensome for him to make the trip up to visit me because he is close to [facility] he can find a primary care internal medicine doctor down there and I will support him send him all the records and he can always call me with any questions    I know him for close to 40 years    Labs review    Results for orders placed or performed in visit on 02/13/23   CBC and differential   Result Value Ref Range    White Blood Cell Count 5 4 3 4 - 10 8 x10E3/uL    Red Blood Cell Count 3 83 (L) 4 14 - 5 80 x10E6/uL    Hemoglobin 11 7 (L) 13 0 - 17 7 g/dL    HCT 34 5 (L) 37 5 - 51 0 %    MCV 90 79 - 97 fL    MCH 30 5 26 6 - 33 0 pg    MCHC 33 9 31 5 - 35 7 g/dL    RDW 14 8 11 6 - 15 4 %    Platelet Count 461 061 - 450 x10E3/uL    Neutrophils 61 Not Estab  %    Lymphocytes 23 Not Estab  %    Monocytes 10 Not Estab  %    Eosinophils 4 Not Estab  %    Basophils PCT 1 Not Estab  %    Neutrophils (Absolute) 3 3 1 4 - 7 0 x10E3/uL    Lymphocytes (Absolute) 1 2 0 7 - 3 1 x10E3/uL    Monocytes (Absolute) 0 5 0 1 - 0 9 x10E3/uL    Eosinophils (Absolute) 0 2 0 0 - 0 4 x10E3/uL    Basophils ABS 0 0 0 0 - 0 2 x10E3/uL    Immature Granulocytes 1 Not Estab  %    Immature Granulocytes (Absolute) 0 1 0 0 - 0 1 x10E3/uL   Comprehensive metabolic panel   Result Value Ref Range    Glucose, Random 141 (H) 70 - 99 mg/dL    BUN 35 (H) 8 - 27 mg/dL    Creatinine 1 69 (H) 0 76 - 1 27 mg/dL    eGFR 39 (L) >59 mL/min/1 73    SL AMB BUN/CREATININE RATIO 21 10 - 24    Sodium 141 134 - 144 mmol/L    Potassium 4 3 3 5 - 5 2 mmol/L    Chloride 105 96 - 106 mmol/L    CO2 22 20 - 29 mmol/L    CALCIUM 9 6 8 6 - 10 2 mg/dL    Protein, Total 6 5 6 0 - 8 5 g/dL    Albumin 4 4 3 6 - 4 6 g/dL    Globulin, Total 2 1 1 5 - 4 5 g/dL    Albumin/Globulin Ratio 2 1 1 2 - 2 2    TOTAL BILIRUBIN 0 4 0 0 - 1 2 mg/dL    Alk Phos Isoenzymes 65 44 - 121 IU/L    AST 30 0 - 40 IU/L    ALT 32 0 - 44 IU/L   TIBC   Result Value Ref Range    Total Iron Binding Capacity (TIBC) 246 (L) 250 - 450 ug/dL    UIBC 172 111 - 343 ug/dL    Iron, Serum 74 38 - 169 ug/dL    Iron Saturation 30 15 - 55 %   Hemoglobin A1c (w/out EAG)   Result Value Ref Range    Hemoglobin A1C 6 6 (H) 4 8 - 5 6 %   TSH, 3rd generation with Free T4 reflex   Result Value Ref Range    TSH 4 470 0 450 - 4 500 uIU/mL   Magnesium   Result Value Ref Range    Magnesium, Serum 1 6 1 6 - 2 3 mg/dL   Ferritin   Result Value Ref Range    Ferritin 498 (H) 30 - 400 ng/mL       No problem-specific Assessment & Plan notes found for this encounter         Diagnoses and all orders for this visit:    Type II diabetes mellitus with peripheral circulatory disorder ([facility] [address] )    Subclinical hypothyroidism    Essential hypertension    Peripheral vascular disease ([facility] [address] )    Idiopathic thrombocytopenic purpura (HCC)    Stage 3 chronic kidney disease, unspecified whether stage 3a or 3b CKD ([facility] )    COVID-19    Polymyalgia rheumatica ([facility] [address] )    Pure hypercholesterolemia    Vitamin D deficiency          Subjective:      Patient ID: Justin Cunha is a 81 y o  male  No chief complaint on file          Current Outpatient Medications:   •  acetaminophen (TYLENOL) 325 mg tablet, Take 2 tablets (650 mg total) by mouth every 6 (six) hours as needed for mild pain (Patient not taking: No sig reported), Disp: , Rfl:   •  amLODIPine (NORVASC) 10 mg tablet, Take 1 tablet (10 mg total) by mouth daily, Disp: 90 tablet, Rfl: 1  •  Ascorbic Acid (VITAMIN C PO), Take 1,000 mg by mouth daily, Disp: , Rfl:   •  aspirin 81 MG tablet, Take 1 tablet by mouth daily, Disp: , Rfl:   •  atorvastatin (LIPITOR) 40 mg tablet, Take 1 tablet daily, Disp: 90 tablet, Rfl: 1  •  betamethasone valerate (VALISONE) 0 1 % cream, Apply topically 2 (two) times a day, Disp: 30 g, Rfl: 0  •  bisoprolol (ZEBETA) 5 mg tablet, Take 1 tablet (5 mg total) by mouth daily, Disp: 90 tablet, Rfl: 0  •  Blood Pressure KIT, by Does not apply route 2 (two) times a week, Disp: 1 each, Rfl: 0  •  Cholecalciferol (Vitamin D3) 25 MCG (1000 UT) CAPS, Take 1 capsule Mon, Wed & Fri, Disp: 30 capsule, Rfl: 1  •  clotrimazole-betamethasone (LOTRISONE) 1-0 05 % cream, apply topically to affected area twice a day (Patient not taking: No sig reported), Disp: 30 g, Rfl: 1  •  ferrous sulfate (FeroSul) 325 (65 Fe) mg tablet, Take one tablet weekly, Disp: 12 tablet, Rfl: 4  •  folic acid (FOLVITE) 1 mg tablet, Take 1 tablet (1,000 mcg total) by mouth daily, Disp: 90 tablet, Rfl: 1  •  glucose blood test strip, by In Vitro route 2 (two) times a day, Disp: , Rfl:   •  indapamide (LOZOL) 2 5 mg tablet, Take 1 tablet (2 5 mg total) by mouth daily, Disp: 90 tablet, Rfl: 1  •  ketoconazole (NIZORAL) 2 % cream, Apply topically daily, Disp: 30 g, Rfl: 1  •  Lysine HCl 1000 MG TABS, Take 1,000 mg by mouth daily , Disp: , Rfl:   •  magnesium chloride-calcium (MAG-SR PLUS CALCIUM)  mg, Take 2 tablets by mouth daily, Disp: , Rfl:   •  multivitamin-minerals (CENTRUM ADULTS) tablet, Take 1 tablet by mouth daily, Disp: , Rfl:   •  omeprazole (PriLOSEC) 40 MG capsule, Take 1 capsule (40 mg total) by mouth daily, Disp: 90 capsule, Rfl: 1  •  predniSONE 1 MG TBEC, 1 mg daily (Patient taking differently: 2 mg daily), Disp: , Rfl:   •  ramipril (ALTACE) 10 MG capsule, Take 1 capsule (10 mg total) by mouth daily, Disp: 90 capsule, Rfl: 1  •  tamsulosin (FLOMAX) 0 4 mg, Take 1 capsule (0 4 mg total) by mouth daily, Disp: 30 capsule, Rfl: 4    HPI    The following portions of the patient's history were reviewed and updated as appropriate: allergies, current medications, past family history, past medical history, past social history, past surgical history and problem list     Review of Systems   Constitutional: Negative  Negative for activity change, appetite change, fatigue, fever and unexpected weight change  HENT: Negative for congestion, ear pain, hearing loss, mouth sores, postnasal drip, rhinorrhea, sore throat, trouble swallowing and voice change  Eyes: Negative for pain, redness and visual disturbance  Respiratory: Negative for cough, chest tightness, shortness of breath and wheezing  Cardiovascular: Negative for chest pain, palpitations and leg swelling  Gastrointestinal: Negative for abdominal distention, abdominal pain, blood in stool, constipation, diarrhea and nausea  Endocrine: Negative for cold intolerance, heat intolerance, polydipsia, polyphagia and polyuria  Genitourinary: Negative for difficulty urinating, dysuria, flank pain, frequency, hematuria and urgency  Musculoskeletal: Positive for myalgias  Negative for arthralgias, back pain, gait problem and joint swelling  Skin: Negative for color change and pallor  Neurological: Negative for dizziness, tremors, seizures, syncope, weakness, numbness and headaches  Hematological: Negative for adenopathy  Does not bruise/bleed easily  Psychiatric/Behavioral: Negative  Negative for sleep disturbance  The patient is not nervous/anxious  Objective: There were no vitals taken for this visit  Physical Exam  Vitals and nursing note reviewed  Constitutional:       Appearance: He is well-developed  HENT:      Head: Normocephalic  Right Ear: External ear normal       Left Ear: External ear normal       Nose: Nose normal       Mouth/Throat:      Pharynx: No oropharyngeal exudate  Eyes:      Conjunctiva/sclera: Conjunctivae normal       Pupils: Pupils are equal, round, and reactive to light  Neck:      Thyroid: No thyromegaly  Cardiovascular:      Rate and Rhythm: Normal rate and regular rhythm  Heart sounds: Normal heart sounds  No murmur heard  No friction rub  No gallop  Comments: S1-S2 regular rhythm  Extremities no edema  Pulmonary:      Effort: Pulmonary effort is normal  No respiratory distress  Breath sounds: Normal breath sounds  No wheezing or rales  Comments: 's are clear no wheezing rales or rhonchi  Abdominal:      General: Bowel sounds are normal  There is no distension  Palpations: Abdomen is soft  There is no mass  Tenderness: There is no abdominal tenderness  There is no guarding or rebound  Comments: Abdomen soft nontender   Musculoskeletal:         General: Tenderness present  Normal range of motion  Cervical back: Normal range of motion and neck supple  Comments: Patient with a left hip and shoulder he is able to move quite well especially since he put himself on a higher dose of prednisone   Lymphadenopathy:      Cervical: No cervical adenopathy  Skin:     General: Skin is warm and dry  Neurological:      Mental Status: He is alert and oriented to person, place, and time     Psychiatric:         Behavior: Behavior normal          Judgment: Judgment normal

## 2023-02-23 DIAGNOSIS — I10 ESSENTIAL HYPERTENSION: ICD-10-CM

## 2023-02-23 RX ORDER — BISOPROLOL FUMARATE 5 MG/1
5 TABLET, FILM COATED ORAL DAILY
Qty: 90 TABLET | Refills: 0 | Status: SHIPPED | OUTPATIENT
Start: 2023-02-23

## 2023-05-16 LAB
25(OH)D3+25(OH)D2 SERPL-MCNC: 37.3 NG/ML (ref 30–100)
ALBUMIN SERPL-MCNC: 4.4 G/DL (ref 3.6–4.6)
ALBUMIN/CREAT UR: 515 MG/G CREAT (ref 0–29)
ALBUMIN/GLOB SERPL: 1.9 {RATIO} (ref 1.2–2.2)
ALP SERPL-CCNC: 59 IU/L (ref 44–121)
ALT SERPL-CCNC: 34 IU/L (ref 0–44)
APPEARANCE UR: CLEAR
AST SERPL-CCNC: 29 IU/L (ref 0–40)
BACTERIA URNS QL MICRO: NORMAL
BASOPHILS # BLD AUTO: 0.1 X10E3/UL (ref 0–0.2)
BASOPHILS NFR BLD AUTO: 2 %
BILIRUB SERPL-MCNC: 0.4 MG/DL (ref 0–1.2)
BILIRUB UR QL STRIP: NEGATIVE
BUN SERPL-MCNC: 29 MG/DL (ref 8–27)
BUN/CREAT SERPL: 22 (ref 10–24)
CALCIUM SERPL-MCNC: 9.3 MG/DL (ref 8.6–10.2)
CASTS URNS QL MICRO: NORMAL /LPF
CHLORIDE SERPL-SCNC: 104 MMOL/L (ref 96–106)
CHOLEST SERPL-MCNC: 217 MG/DL (ref 100–199)
CO2 SERPL-SCNC: 22 MMOL/L (ref 20–29)
COLOR UR: YELLOW
CREAT SERPL-MCNC: 1.34 MG/DL (ref 0.76–1.27)
CREAT UR-MCNC: 60 MG/DL
CRP SERPL-MCNC: 2 MG/L (ref 0–10)
EGFR: 51 ML/MIN/1.73
EOSINOPHIL # BLD AUTO: 0.3 X10E3/UL (ref 0–0.4)
EOSINOPHIL NFR BLD AUTO: 5 %
EPI CELLS #/AREA URNS HPF: NORMAL /HPF (ref 0–10)
ERYTHROCYTE [DISTWIDTH] IN BLOOD BY AUTOMATED COUNT: 14 % (ref 11.6–15.4)
FERRITIN SERPL-MCNC: 371 NG/ML (ref 30–400)
GLOBULIN SER-MCNC: 2.3 G/DL (ref 1.5–4.5)
GLUCOSE SERPL-MCNC: 105 MG/DL (ref 70–99)
GLUCOSE UR QL: NEGATIVE
HBA1C MFR BLD: 6.3 % (ref 4.8–5.6)
HCT VFR BLD AUTO: 37.7 % (ref 37.5–51)
HDLC SERPL-MCNC: 55 MG/DL
HGB BLD-MCNC: 12.4 G/DL (ref 13–17.7)
HGB UR QL STRIP: NEGATIVE
IMM GRANULOCYTES # BLD: 0.1 X10E3/UL (ref 0–0.1)
IMM GRANULOCYTES NFR BLD: 1 %
KETONES UR QL STRIP: NEGATIVE
LDLC SERPL CALC-MCNC: 116 MG/DL (ref 0–99)
LEUKOCYTE ESTERASE UR QL STRIP: NEGATIVE
LYMPHOCYTES # BLD AUTO: 1.6 X10E3/UL (ref 0.7–3.1)
LYMPHOCYTES NFR BLD AUTO: 24 %
MAGNESIUM SERPL-MCNC: 1.7 MG/DL (ref 1.6–2.3)
MCH RBC QN AUTO: 31.1 PG (ref 26.6–33)
MCHC RBC AUTO-ENTMCNC: 32.9 G/DL (ref 31.5–35.7)
MCV RBC AUTO: 95 FL (ref 79–97)
MICRO URNS: ABNORMAL
MICROALBUMIN UR-MCNC: 308.8 UG/ML
MONOCYTES # BLD AUTO: 0.7 X10E3/UL (ref 0.1–0.9)
MONOCYTES NFR BLD AUTO: 10 %
NEUTROPHILS # BLD AUTO: 4.1 X10E3/UL (ref 1.4–7)
NEUTROPHILS NFR BLD AUTO: 58 %
NITRITE UR QL STRIP: NEGATIVE
PH UR STRIP: 5.5 [PH] (ref 5–7.5)
PLATELET # BLD AUTO: 161 X10E3/UL (ref 150–450)
POTASSIUM SERPL-SCNC: 4.5 MMOL/L (ref 3.5–5.2)
PROT SERPL-MCNC: 6.7 G/DL (ref 6–8.5)
PROT UR QL STRIP: ABNORMAL
RBC # BLD AUTO: 3.99 X10E6/UL (ref 4.14–5.8)
RBC #/AREA URNS HPF: NORMAL /HPF (ref 0–2)
RETICS/RBC NFR AUTO: 1.4 % (ref 0.6–2.6)
SL AMB VLDL CHOLESTEROL CALC: 46 MG/DL (ref 5–40)
SODIUM SERPL-SCNC: 143 MMOL/L (ref 134–144)
SP GR UR: 1.01 (ref 1–1.03)
TRIGL SERPL-MCNC: 267 MG/DL (ref 0–149)
UROBILINOGEN UR STRIP-ACNC: 0.2 MG/DL (ref 0.2–1)
WBC # BLD AUTO: 6.9 X10E3/UL (ref 3.4–10.8)
WBC #/AREA URNS HPF: NORMAL /HPF (ref 0–5)

## 2023-05-22 DIAGNOSIS — I10 ESSENTIAL HYPERTENSION: ICD-10-CM

## 2023-05-22 DIAGNOSIS — E61.1 LOW IRON: ICD-10-CM

## 2023-05-22 RX ORDER — INDAPAMIDE 2.5 MG/1
2.5 TABLET, FILM COATED ORAL DAILY
Qty: 90 TABLET | Refills: 0 | Status: SHIPPED | OUTPATIENT
Start: 2023-05-22

## 2023-05-22 RX ORDER — FERROUS SULFATE 325(65) MG
TABLET ORAL
Qty: 12 TABLET | Refills: 0 | Status: SHIPPED | OUTPATIENT
Start: 2023-05-22

## 2023-05-23 ENCOUNTER — OFFICE VISIT (OUTPATIENT)
Dept: INTERNAL MEDICINE CLINIC | Facility: CLINIC | Age: 88
End: 2023-05-23

## 2023-05-23 VITALS
DIASTOLIC BLOOD PRESSURE: 64 MMHG | HEIGHT: 66 IN | TEMPERATURE: 97.8 F | BODY MASS INDEX: 31.34 KG/M2 | SYSTOLIC BLOOD PRESSURE: 130 MMHG | RESPIRATION RATE: 13 BRPM | HEART RATE: 80 BPM | WEIGHT: 195 LBS

## 2023-05-23 DIAGNOSIS — E03.8 SUBCLINICAL HYPOTHYROIDISM: ICD-10-CM

## 2023-05-23 DIAGNOSIS — U07.1 COVID-19: ICD-10-CM

## 2023-05-23 DIAGNOSIS — Z12.12 SCREENING FOR COLORECTAL CANCER: ICD-10-CM

## 2023-05-23 DIAGNOSIS — I10 ESSENTIAL HYPERTENSION: ICD-10-CM

## 2023-05-23 DIAGNOSIS — Z12.11 SCREENING FOR COLORECTAL CANCER: ICD-10-CM

## 2023-05-23 DIAGNOSIS — M35.3 POLYMYALGIA RHEUMATICA (HCC): ICD-10-CM

## 2023-05-23 DIAGNOSIS — E11.51 TYPE II DIABETES MELLITUS WITH PERIPHERAL CIRCULATORY DISORDER (HCC): Primary | ICD-10-CM

## 2023-05-23 DIAGNOSIS — E55.9 VITAMIN D DEFICIENCY: ICD-10-CM

## 2023-05-23 DIAGNOSIS — N18.30 STAGE 3 CHRONIC KIDNEY DISEASE, UNSPECIFIED WHETHER STAGE 3A OR 3B CKD (HCC): ICD-10-CM

## 2023-05-23 DIAGNOSIS — E78.00 PURE HYPERCHOLESTEROLEMIA: ICD-10-CM

## 2023-05-23 DIAGNOSIS — M54.32 SCIATICA OF LEFT SIDE: ICD-10-CM

## 2023-05-23 DIAGNOSIS — D69.3 IDIOPATHIC THROMBOCYTOPENIC PURPURA (HCC): ICD-10-CM

## 2023-05-23 RX ORDER — PREDNISONE 5 MG/1
TABLET ORAL DAILY
COMMUNITY

## 2023-05-23 RX ORDER — BISOPROLOL FUMARATE 5 MG/1
5 TABLET, FILM COATED ORAL DAILY
Qty: 90 TABLET | Refills: 0 | Status: SHIPPED | OUTPATIENT
Start: 2023-05-23

## 2023-05-23 NOTE — PATIENT INSTRUCTIONS
We will let the rheumatologist know about your DEXA scan your risk for osteoporosis especially in the hip    See pain management for your sciatica    Otherwise your labs are stable

## 2023-05-23 NOTE — PROGRESS NOTES
Assessment/Plan:      #1 acute diverticulitis was treated in the office down appropriately went to the ER he was given Augmentin he has some rectal bleeding  The CAT scan showed extensive sigmoid diverticulosis  suspected minor degree of distal diverticulitis no CAT scan evidence of perforation or pericolonic abscess formation was treated with Augmentin which tolerated the medication well the last dose was today    2  Chart review he has high FRAX score 10% with osteopenia on the DEXA scan we will send the report to the rheumatologist he is on prednisone for polymyalgia rheumatica    3  Coronary artery disease stable no chest pain palpitation shortness of breath  4   Idiopathic thrombocytopenic purpura  Platelet count is stable no evidence of thrombocytopenia CBC was 12 4 hematocrit 37 with a white count side 6900 and the platelet count of 832,260    4  Chronic renal insufficiency has been stable BUN in May 15 was 29 creatinine 1 34 with a potassium of 4 5    5  Polymyalgia rheumatica initially when he saw the rheumatologist in April prednisone was up to 10 mg he is now down to 5 mg  Vitamin D level is therapeutic    6  He has some pain in her hip hip radiating down the leg he feels is more sciatica he had this before on the left side he is going to [facility] pain management which I put a prescription for that      7   His cholesterol went up with dietary indiscretion continue Lipitor 40 mg he can watch his diet we will check a lipid profile when he comes back    Labs reviewed    Results for orders placed or performed in visit on 05/15/23   CBC and differential   Result Value Ref Range    White Blood Cell Count 6 9 3 4 - 10 8 x10E3/uL    Red Blood Cell Count 3 99 (L) 4 14 - 5 80 x10E6/uL    Hemoglobin 12 4 (L) 13 0 - 17 7 g/dL    HCT 37 7 37 5 - 51 0 %    MCV 95 79 - 97 fL    MCH 31 1 26 6 - 33 0 pg    MCHC 32 9 31 5 - 35 7 g/dL    RDW 14 0 11 6 - 15 4 %    Platelet Count 108 362 - 450 x10E3/uL Neutrophils 58 Not Estab  %    Lymphocytes 24 Not Estab  %    Monocytes 10 Not Estab  %    Eosinophils 5 Not Estab  %    Basophils PCT 2 Not Estab  %    Neutrophils (Absolute) 4 1 1 4 - 7 0 x10E3/uL    Lymphocytes (Absolute) 1 6 0 7 - 3 1 x10E3/uL    Monocytes (Absolute) 0 7 0 1 - 0 9 x10E3/uL    Eosinophils (Absolute) 0 3 0 0 - 0 4 x10E3/uL    Basophils ABS 0 1 0 0 - 0 2 x10E3/uL    Immature Granulocytes 1 Not Estab  %    Immature Granulocytes (Absolute) 0 1 0 0 - 0 1 x10E3/uL   Comprehensive metabolic panel   Result Value Ref Range    Glucose, Random 105 (H) 70 - 99 mg/dL    BUN 29 (H) 8 - 27 mg/dL    Creatinine 1 34 (H) 0 76 - 1 27 mg/dL    eGFR 51 (L) >59 mL/min/1 73    SL AMB BUN/CREATININE RATIO 22 10 - 24    Sodium 143 134 - 144 mmol/L    Potassium 4 5 3 5 - 5 2 mmol/L    Chloride 104 96 - 106 mmol/L    CO2 22 20 - 29 mmol/L    CALCIUM 9 3 8 6 - 10 2 mg/dL    Protein, Total 6 7 6 0 - 8 5 g/dL    Albumin 4 4 3 6 - 4 6 g/dL    Globulin, Total 2 3 1 5 - 4 5 g/dL    Albumin/Globulin Ratio 1 9 1 2 - 2 2    TOTAL BILIRUBIN 0 4 0 0 - 1 2 mg/dL    Alk Phos Isoenzymes 59 44 - 121 IU/L    AST 29 0 - 40 IU/L    ALT 34 0 - 44 IU/L   UA (URINE) with reflex to Scope   Result Value Ref Range    Specific Gravity 1 014 1 005 - 1 030    Ph 5 5 5 0 - 7 5    Color UA Yellow Yellow    Urine Appearance Clear Clear    Leukocyte Esterase Negative Negative    Protein 2+ (A) Negative/Trace    Glucose, 24 HR Urine Negative Negative    Ketone, Urine Negative Negative    Blood, Urine Negative Negative    Bilirubin, Urine Negative Negative    Urobilinogen Urine 0 2 0 2 - 1 0 mg/dL    SL AMB NITRITES URINE, QUAL   Negative Negative    Microscopic Examination See below:    Microscopic Examination   Result Value Ref Range    SL AMB WBC, URINE None seen 0 - 5 /hpf    RBC, Urine None seen 0 - 2 /hpf    Epithelial Cells (non renal) 0-10 0 - 10 /hpf    Casts None seen None seen /lpf    Bacteria, Urine Few None seen/Few   Lipid panel   Result Value Ref Range    Cholesterol, Total 217 (H) 100 - 199 mg/dL    Triglycerides 267 (H) 0 - 149 mg/dL    HDL 55 >39 mg/dL    VLDL Cholesterol Calculated 46 (H) 5 - 40 mg/dL    LDL Calculated 116 (H) 0 - 99 mg/dL   Albumin / creatinine urine ratio   Result Value Ref Range    Creatinine, Urine 60 0 Not Estab  mg/dL    Albumin,U,Random 308 8 Not Estab  ug/mL    Microalb/Creat Ratio 515 (H) 0 - 29 mg/g creat   Hemoglobin A1c (w/out EAG)   Result Value Ref Range    Hemoglobin A1C 6 3 (H) 4 8 - 5 6 %   Vitamin D 25 hydroxy   Result Value Ref Range    25-HYDROXY VIT D 37 3 30 0 - 100 0 ng/mL   Magnesium   Result Value Ref Range    Magnesium, Serum 1 7 1 6 - 2 3 mg/dL   Ferritin   Result Value Ref Range    Ferritin 371 30 - 400 ng/mL   Reticulocytes   Result Value Ref Range    Reticulocyte Count 1 4 0 6 - 2 6 %   C-reactive protein   Result Value Ref Range    C-Reactive Protein, Quant 2 0 - 10 mg/L       No problem-specific Assessment & Plan notes found for this encounter  Diagnoses and all orders for this visit:    Type II diabetes mellitus with peripheral circulatory disorder ([facility] )    Subclinical hypothyroidism    Essential hypertension    Idiopathic thrombocytopenic purpura (HCC)    Stage 3 chronic kidney disease, unspecified whether stage 3a or 3b CKD (HCC)    Pure hypercholesterolemia    Vitamin D deficiency    Polymyalgia rheumatica ([facility] )    COVID-19          Subjective:      Patient ID: Cecilia Vitale is a 81 y o  male  No chief complaint on file          Current Outpatient Medications:   •  acetaminophen (TYLENOL) 325 mg tablet, Take 2 tablets (650 mg total) by mouth every 6 (six) hours as needed for mild pain (Patient not taking: Reported on 11/5/2021), Disp: , Rfl:   •  amLODIPine (NORVASC) 10 mg tablet, Take 1 tablet (10 mg total) by mouth daily, Disp: 90 tablet, Rfl: 1  •  Ascorbic Acid (VITAMIN C PO), Take 1,000 mg by mouth daily, Disp: , Rfl:   •  aspirin 81 MG tablet, Take 1 tablet by mouth daily, Disp: , Rfl:   •  atorvastatin (LIPITOR) 40 mg tablet, Take 1 tablet daily, Disp: 90 tablet, Rfl: 1  •  betamethasone valerate (VALISONE) 0 1 % cream, Apply topically 2 (two) times a day, Disp: 30 g, Rfl: 0  •  bisoprolol (ZEBETA) 5 mg tablet, Take 1 tablet (5 mg total) by mouth daily, Disp: 90 tablet, Rfl: 0  •  Blood Pressure KIT, by Does not apply route 2 (two) times a week, Disp: 1 each, Rfl: 0  •  Cholecalciferol (Vitamin D3) 25 MCG (1000 UT) CAPS, Take 1 capsule Mon, Wed & Fri, Disp: 30 capsule, Rfl: 1  •  clotrimazole-betamethasone (LOTRISONE) 1-0 05 % cream, apply topically to affected area twice a day (Patient not taking: No sig reported), Disp: 30 g, Rfl: 1  •  ferrous sulfate (FeroSul) 325 (65 Fe) mg tablet, Take one tablet weekly, Disp: 12 tablet, Rfl: 0  •  folic acid (FOLVITE) 1 mg tablet, Take 1 tablet (1,000 mcg total) by mouth daily, Disp: 90 tablet, Rfl: 1  •  glucose blood test strip, by In Vitro route 2 (two) times a day, Disp: , Rfl:   •  indapamide (LOZOL) 2 5 mg tablet, Take 1 tablet (2 5 mg total) by mouth daily, Disp: 90 tablet, Rfl: 0  •  ketoconazole (NIZORAL) 2 % cream, Apply topically daily, Disp: 30 g, Rfl: 1  •  Lysine HCl 1000 MG TABS, Take 1,000 mg by mouth daily , Disp: , Rfl:   •  magnesium chloride-calcium (MAG-SR PLUS CALCIUM)  mg, Take 2 tablets by mouth daily, Disp: , Rfl:   •  multivitamin-minerals (CENTRUM ADULTS) tablet, Take 1 tablet by mouth daily, Disp: , Rfl:   •  omeprazole (PriLOSEC) 40 MG capsule, Take 1 capsule (40 mg total) by mouth daily, Disp: 90 capsule, Rfl: 1  •  predniSONE 1 MG TBEC, 1 mg daily (Patient taking differently: 2 mg daily), Disp: , Rfl:   •  ramipril (ALTACE) 10 MG capsule, Take 1 capsule (10 mg total) by mouth daily, Disp: 90 capsule, Rfl: 1  •  tamsulosin (FLOMAX) 0 4 mg, Take 1 capsule (0 4 mg total) by mouth daily, Disp: 30 capsule, Rfl: 4    HPI    The following portions of the patient's history were reviewed and updated as appropriate: allergies, current medications, past family history, past medical history, past social history, past surgical history and problem list     Review of Systems   Constitutional: Negative  Negative for activity change, appetite change, fatigue, fever and unexpected weight change  HENT: Negative for congestion, ear pain, hearing loss, mouth sores, postnasal drip, rhinorrhea, sore throat, trouble swallowing and voice change  Eyes: Negative for pain, redness and visual disturbance  Respiratory: Negative for cough, chest tightness, shortness of breath and wheezing  Cardiovascular: Negative for chest pain, palpitations and leg swelling  Gastrointestinal: Negative for abdominal distention, abdominal pain, blood in stool, constipation, diarrhea and nausea  Endocrine: Negative for cold intolerance, heat intolerance, polydipsia, polyphagia and polyuria  Genitourinary: Negative for difficulty urinating, dysuria, flank pain, frequency, hematuria and urgency  Musculoskeletal: Positive for arthralgias and back pain  Negative for gait problem, joint swelling and myalgias  Skin: Negative for color change and pallor  Neurological: Negative for dizziness, tremors, seizures, syncope, weakness, numbness and headaches  Hematological: Negative for adenopathy  Does not bruise/bleed easily  Psychiatric/Behavioral: Negative  Negative for sleep disturbance  The patient is not nervous/anxious  Objective: There were no vitals taken for this visit  Physical Exam  Vitals and nursing note reviewed  Constitutional:       Appearance: He is well-developed  HENT:      Head: Normocephalic  Right Ear: External ear normal       Left Ear: External ear normal       Nose: Nose normal       Mouth/Throat:      Pharynx: No oropharyngeal exudate  Eyes:      Conjunctiva/sclera: Conjunctivae normal       Pupils: Pupils are equal, round, and reactive to light  Neck:      Thyroid: No thyromegaly  Cardiovascular:      Rate and Rhythm: Normal rate and regular rhythm  Heart sounds: Normal heart sounds  No murmur heard  No friction rub  No gallop  Comments: 1 S2 regular rhythm  Extremities no edema  Pulmonary:      Effort: Pulmonary effort is normal  No respiratory distress  Breath sounds: Normal breath sounds  No wheezing or rales  Comments: 's are clear no wheezing rales or rhonchi  Abdominal:      General: Bowel sounds are normal  There is no distension  Palpations: Abdomen is soft  There is no mass  Tenderness: There is no abdominal tenderness  There is no guarding or rebound  Comments: Abdomen soft nontender no rebound tenderness no abdominal masses palpable  No CVA tenderness or suprapubic tenderness   Musculoskeletal:         General: Normal range of motion  Cervical back: Normal range of motion and neck supple  Lymphadenopathy:      Cervical: No cervical adenopathy  Skin:     General: Skin is warm and dry  Neurological:      Mental Status: He is alert and oriented to person, place, and time     Psychiatric:         Behavior: Behavior normal          Judgment: Judgment normal          Answers for HPI/ROS submitted by the patient on 5/16/2023  How often during the last year have you found that you were not able to stop drinking once you had started?: 0 - never  How often during the last year have you failed to do what was normally expected from you because of drinking?: 0 - never  How often during the last year have you needed a first drink in the morning to get yourself going after a heavy drinking session?: 0 - never  How often during the last year have you had a feeling of guilt or remorse after drinking?: 0 - never  How often during the last year have you been unable to remember what happened the night before because you had been drinking?: 0 - never  Have you or someone else been injured as a result of your drinking?: 0 - no  Has a relative or friend or a doctor or another health worker been concerned about your drinking or suggested you cut down?: 0 - no  How would you rate your overall health?: good  Compared to last year, how is your physical health?: same  In general, how satisfied are you with your life?: very satisfied  Compared to last year, how is your eyesight?: same  Compared to last year, how is your hearing?: same  Compared to last year, how is your emotional/mental health?: much better  How often is anger a problem for you?: never, rarely  How often do you feel unusually tired/fatigued?: never, rarely  In the past 7 days, how much pain have you experienced?: none  In the past 6 months, have you lost or gained 10 pounds without trying?: No  One or more falls in the last year: No  Do you have trouble with the stairs inside or outside your home?: No  Does your home have working smoke alarms?: Yes  Does your home have a carbon monoxide monitor?: Yes  Which safety hazards (if any) have you experienced in your home? Please select all that apply : none  How would you describe your current diet?  Please select all that apply : Regular, Diabetic  In addition to prescription medications, are you taking any over-the-counter supplements?: Yes  If yes, what supplements are you taking?: Vitimans  Can you manage your medications?: Yes  Are you currently taking any opioid medications?: No  Can you walk and transfer into and out of your bed and chair?: Yes  Can you dress and groom yourself?: Yes  Can you bathe or shower yourself?: Yes  Can you feed yourself?: Yes  Can you do your laundry/ housekeeping?: Yes  Can you manage your money, pay your bills, and track your expenses?: Yes  Can you make your own meals?: Yes  Can you do your own shopping?: Yes  Within the last 12 months, have you had any hospitalizations or Emergency Department visits?: No  Do you have a living will?: Yes  Do you have a Durable POA (Power of ) for healthcare decisions?: Yes  Do you have an Advanced Directive for end of life decisions?: Yes  How often have you used an illegal drug (including marijuana) or a prescription medication for non-medical reasons in the past year?: never  What is the typical number of drinks you consume in a day?: 1  What is the typical number of drinks you consume in a week?: 6  How often did you have a drink containing alcohol in the past year?: 4 or more times a week  How many drinks did you have on a typical day  when you were drinking in the past year?: 1 to 2  How often did you have 6 or more drinks on one occasion in the past year?: monthly

## 2023-05-23 NOTE — PROGRESS NOTES
Assessment and Plan:     Problem List Items Addressed This Visit        Endocrine    Type II diabetes mellitus with peripheral circulatory disorder (HCC) - Primary    Subclinical hypothyroidism    Relevant Medications    predniSONE 5 mg tablet       Cardiovascular and Mediastinum    Essential hypertension       Hematopoietic and Hemostatic    Idiopathic thrombocytopenic purpura (HCC)    Relevant Medications    predniSONE 5 mg tablet       Genitourinary    Stage 3 chronic kidney disease, unspecified whether stage 3a or 3b CKD ([facility] )       Other    Pure hypercholesterolemia    Vitamin D deficiency    Polymyalgia rheumatica ([facility] )    COVID-19     BMI Counseling: Body mass index is 31 47 kg/m²  The BMI is above normal  Nutrition recommendations include decreasing portion sizes, encouraging healthy choices of fruits and vegetables, decreasing fast food intake, consuming healthier snacks, limiting drinks that contain sugar, moderation in carbohydrate intake, increasing intake of lean protein, reducing intake of saturated and trans fat and reducing intake of cholesterol  Exercise recommendations include exercising 3-5 times per week  No pharmacotherapy was ordered  Patient referred to PCP  Rationale for BMI follow-up plan is due to patient being overweight or obese  Preventive health issues were discussed with patient, and age appropriate screening tests were ordered as noted in patient's After Visit Summary  Personalized health advice and appropriate referrals for health education or preventive services given if needed, as noted in patient's After Visit Summary       History of Present Illness:     Patient presents for a Medicare Wellness Visit    HPI   Patient Care Team:  Marin Calderón MD as PCP - General  MD Jamshid Ford MD (Vascular Surgery)  Mil Burk MD (Rheumatology)     Review of Systems:     Review of Systems     Problem List:     Patient Active Problem List Diagnosis   • Type II diabetes mellitus with peripheral circulatory disorder (HCC)   • Peripheral vascular disease (HCC)   • AAA (abdominal aortic aneurysm) without rupture (HCC)   • Essential hypertension   • Pure hypercholesterolemia   • [place] post (EVAR) endovascular aneurysm repair, initial encounter   • History of acute inferior wall MI   • Preop cardiovascular exam   • Vitamin D deficiency   • Subclinical hypothyroidism   • Polymyalgia rheumatica (HCC)   • Guaiac positive stools   • Idiopathic thrombocytopenic purpura (HCC)   • Stage 3 chronic kidney disease, unspecified whether stage 3a or 3b CKD (HCC)   • Numbness and tingling in left arm   • Osteoarthritis of spine with radiculopathy, cervical region   • Cervical spinal stenosis   • Carpal tunnel syndrome of left wrist   • COVID-19      Past Medical and Surgical History:     Past Medical History:   Diagnosis Date   • Allergic rhinitis     resolved 12/15/2017   • Anemia     resolved 12/15/2017   • Arthritis    • Cataract     resolved 12/15/2017   • Coronary artery disease    • Hearing problem    • Heart attack ([address] )    • Hiatal hernia    • Hyperlipidemia    • Hypertension    • Impaired fasting glucose     resolved 12/15/2017   • Numbness of leg     resolved 12/15/2017   • Pneumonia 03/2019   • Polyarthritis     resolved 12/15/2017   • PONV (postoperative nausea and vomiting)    • PVD (peripheral vascular disease) ([facility])      Past Surgical History:   Procedure Laterality Date   • CARDIAC SURGERY     • CATARACT EXTRACTION Bilateral    • CORONARY ANGIOPLASTY WITH STENT PLACEMENT     • IR EVAR     • IR EVAR  7/1/2019   • OTHER SURGICAL HISTORY      detatched bicep tendon   • MI EVASC RPR DPLMNT AORTO-AORTIC NDGFT N/A 7/1/2019    Procedure: REVISION EVAR WITH AORTIC EXTENSION CUFF X3 WITH 37A15oq GORE, RIGHT RENAL STENTING WITH 6X16mm iCASt;  Surgeon: Rl Cross MD;  Location: [facility];  Service: Vascular      Family History:     Family History Problem Relation Age of Onset   • Anemia Mother    • No Known Problems Father    • Colon cancer Maternal Grandmother       Social History:     Social History     Socioeconomic History   • Marital status: /Civil Union     Spouse name: None   • Number of children: None   • Years of education: None   • Highest education level: None   Occupational History   • None   Tobacco Use   • Smoking status: Former     Packs/day: 3 00     Years: 30 00     Pack years: 90 00     Types: Cigarettes     Quit date:      Years since quittin 4     Passive exposure: Past   • Smokeless tobacco: Never   • Tobacco comments:     quit around the    Substance and Sexual Activity   • Alcohol use: Yes     Comment: soically   • Drug use: Never   • Sexual activity: Not Currently   Other Topics Concern   • None   Social History Narrative   • None     Social Determinants of Health     Financial Resource Strain: Low Risk    • Difficulty of Paying Living Expenses: Not hard at all   Food Insecurity: Not on file   Transportation Needs: No Transportation Needs   • Lack of Transportation (Medical): No   • Lack of Transportation (Non-Medical):  No   Physical Activity: Not on file   Stress: Not on file   Social Connections: Not on file   Intimate Partner Violence: Not on file   Housing Stability: Not on file      Medications and Allergies:     Current Outpatient Medications   Medication Sig Dispense Refill   • acetaminophen (TYLENOL) 325 mg tablet Take 2 tablets (650 mg total) by mouth every 6 (six) hours as needed for mild pain     • amLODIPine (NORVASC) 10 mg tablet Take 1 tablet (10 mg total) by mouth daily 90 tablet 1   • Ascorbic Acid (VITAMIN C PO) Take 1,000 mg by mouth daily     • aspirin 81 MG tablet Take 1 tablet by mouth daily     • atorvastatin (LIPITOR) 40 mg tablet Take 1 tablet daily 90 tablet 1   • betamethasone valerate (VALISONE) 0 1 % cream Apply topically 2 (two) times a day 30 g 0   • bisoprolol (ZEBETA) 5 mg tablet Take 1 tablet (5 mg total) by mouth daily 90 tablet 0   • Blood Pressure KIT by Does not apply route 2 (two) times a week 1 each 0   • Cholecalciferol (Vitamin D3) 25 MCG (1000 UT) CAPS Take 1 capsule Mon, Wed & Fri 30 capsule 1   • ferrous sulfate (FeroSul) 325 (65 Fe) mg tablet Take one tablet weekly 12 tablet 0   • folic acid (FOLVITE) 1 mg tablet Take 1 tablet (1,000 mcg total) by mouth daily 90 tablet 1   • glucose blood test strip by In Vitro route 2 (two) times a day     • indapamide (LOZOL) 2 5 mg tablet Take 1 tablet (2 5 mg total) by mouth daily 90 tablet 0   • ketoconazole (NIZORAL) 2 % cream Apply topically daily 30 g 1   • Lysine HCl 1000 MG TABS Take 1,000 mg by mouth daily      • magnesium chloride-calcium (MAG-SR PLUS CALCIUM)  mg Take 2 tablets by mouth daily     • multivitamin-minerals (CENTRUM ADULTS) tablet Take 1 tablet by mouth daily     • predniSONE 5 mg tablet Take by mouth daily     • ramipril (ALTACE) 10 MG capsule Take 1 capsule (10 mg total) by mouth daily 90 capsule 1   • tamsulosin (FLOMAX) 0 4 mg Take 1 capsule (0 4 mg total) by mouth daily 30 capsule 4   • clotrimazole-betamethasone (LOTRISONE) 1-0 05 % cream apply topically to affected area twice a day (Patient not taking: No sig reported) 30 g 1   • omeprazole (PriLOSEC) 40 MG capsule Take 1 capsule (40 mg total) by mouth daily 90 capsule 1   • predniSONE 1 MG TBEC 1 mg daily (Patient not taking: Reported on 5/23/2023)       No current facility-administered medications for this visit       Allergies   Allergen Reactions   • Sulfamethoxazole-Trimethoprim Nausea Only      Immunizations:     Immunization History   Administered Date(s) Administered   • COVID-19 MODERNA VACC 0 5 ML IM 02/06/2021, 03/04/2021   • INFLUENZA 12/08/2004, 10/24/2005, 09/19/2011, 09/30/2014, 09/01/2015, 10/07/2016, 11/02/2017   • Influenza Quadrivalent Preservative Free 3 years and older IM 09/01/2015   • Influenza Split High Dose Preservative Free IM 10/07/2016, 11/02/2017, 11/02/2017   • Influenza, high dose seasonal 0 7 mL 09/26/2018, 10/30/2019, 11/04/2020, 10/18/2022   • Influenza, seasonal, injectable 08/24/2012, 09/25/2013, 09/30/2014   • Pneumococcal Conjugate 13-Valent 04/20/2015   • Pneumococcal Polysaccharide PPV23 02/01/2008, 08/18/2014, 02/21/2018   • Zoster 08/26/2010   • Zoster Vaccine Recombinant 06/29/2018, 09/27/2018   • influenza, injectable, quadrivalent 10/06/2021      Health Maintenance:         Topic Date Due   • Colorectal Cancer Screening  01/29/2020   • Hepatitis C Screening  Completed         Topic Date Due   • COVID-19 Vaccine (3 - Booster for Claven Radon series) 04/29/2021      Medicare Screening Tests and Risk Assessments:     Jodie Domingo is here for his Subsequent Wellness visit  Health Risk Assessment:   Patient rates overall health as good  Patient feels that their physical health rating is same  Patient is very satisfied with their life  Eyesight was rated as same  Hearing was rated as same  Patient feels that their emotional and mental health rating is much better  Patients states they are never, rarely angry  Patient states they are never, rarely unusually tired/fatigued  Pain experienced in the last 7 days has been none  Patient states that he has experienced no weight loss or gain in last 6 months  Fall Risk Screening: In the past year, patient has experienced: no history of falling in past year      Home Safety:  Patient does not have trouble with stairs inside or outside of their home  Patient has working smoke alarms and has working carbon monoxide detector  Home safety hazards include: none  Nutrition:   Current diet is Regular and Diabetic  Medications:   Patient is currently taking over-the-counter supplements  OTC medications include: Vitimans  Patient is able to manage medications       Activities of Daily Living (ADLs)/Instrumental Activities of Daily Living (IADLs):   Walk and transfer into and out of bed and chair?: Yes  Dress and groom yourself?: Yes    Bathe or shower yourself?: Yes    Feed yourself? Yes  Do your laundry/housekeeping?: Yes  Manage your money, pay your bills and track your expenses?: Yes  Make your own meals?: Yes    Do your own shopping?: Yes    Previous Hospitalizations:   Any hospitalizations or ED visits within the last 12 months?: No      Hospitalization Comments: ER visit with a diagnosis of acute diverticulitis treated with Augmentin he has some rectal bleeding is going to follow-up with gastroenterology for colonoscopy in the future    Advance Care Planning:   Living will: Yes    Durable POA for healthcare: Yes    Advanced directive: Yes    End of Life Decisions reviewed with patient: Yes    Provider agrees with end of life decisions: Yes      Cognitive Screening:   Provider or family/friend/caregiver concerned regarding cognition?: No    PREVENTIVE SCREENINGS      Cardiovascular Screening:    General: Screening Not Indicated and History Lipid Disorder      Diabetes Screening:     General: Screening Not Indicated and History Diabetes      Colorectal Cancer Screening:     General: Screening Not Indicated      Prostate Cancer Screening:    General: Screening Not Indicated      Osteoporosis Screening:    General: Screening Current      Abdominal Aortic Aneurysm (AAA) Screening:    Risk factors include: tobacco use        General: Screening Not Indicated, History AAA and Screening Current      Lung Cancer Screening:     General: Screening Not Indicated      Hepatitis C Screening:    General: Screening Current    Screening, Brief Intervention, and Referral to Treatment (SBIRT)    Screening  Typical number of drinks in a day: 1  Typical number of drinks in a week: 6  Interpretation: Low risk drinking behavior      AUDIT-C Screenin) How often did you have a drink containing alcohol in the past year? 4 or more times a week  2) How many drinks did you have on a typical day when you were drinking in "the past year? 1 to 2  3) How often did you have 6 or more drinks on one occasion in the past year? monthly    AUDIT-C Score: 6  Interpretation: Score 4-12 (male): POSITIVE screen for alcohol misuse    AUDIT Screenin) How often during the last year have you found that you were not able to stop drinking once you had started? 0 - never  5) How often during the last year have you failed to do what was normally expected from you because of drinking? 0 - never  6) How often during the last year have you needed a first drink in the morning to get yourself going after a heavy drinking session? 0 - never  7) How often during the last year have you had a feeling of guilt or remorse after drinking? 0 - never  8) How often during the last year have you been unable to remember what happened the night before because you had been drinking? 0 - never  9) Have you or someone else been injured as a result of your drinking? 0 - no  10) Has a relative or friend or a doctor or another health worker been concerned about your drinking or suggested you cut down? 0 - no    AUDIT Score: 6  Interpretation: Low risk alcohol consumption    Single Item Drug Screening:  How often have you used an illegal drug (including marijuana) or a prescription medication for non-medical reasons in the past year? never    Single Item Drug Screen Score: 0  Interpretation: Negative screen for possible drug use disorder    Other Counseling Topics:   Car/seat belt/driving safety, skin self-exam, sunscreen and calcium and vitamin D intake and regular weightbearing exercise  No results found       Physical Exam:     /64 (BP Location: Left arm, Patient Position: Sitting, Cuff Size: Standard)   Pulse 80   Temp 97 8 °F (36 6 °C)   Resp 13   Ht 5' 6\" (1 676 m)   Wt 88 5 kg (195 lb)   BMI 31 47 kg/m²     Physical Exam     Frank Lockwood MD  "

## 2023-05-24 DIAGNOSIS — E55.9 VITAMIN D DEFICIENCY: ICD-10-CM

## 2023-06-06 DIAGNOSIS — R35.0 BENIGN PROSTATIC HYPERPLASIA WITH URINARY FREQUENCY: ICD-10-CM

## 2023-06-06 DIAGNOSIS — N40.1 BENIGN PROSTATIC HYPERPLASIA WITH URINARY FREQUENCY: ICD-10-CM

## 2023-06-06 RX ORDER — TAMSULOSIN HYDROCHLORIDE 0.4 MG/1
0.4 CAPSULE ORAL DAILY
Qty: 30 CAPSULE | Refills: 4 | Status: SHIPPED | OUTPATIENT
Start: 2023-06-06

## 2023-07-18 DIAGNOSIS — I10 ESSENTIAL HYPERTENSION: ICD-10-CM

## 2023-07-18 RX ORDER — RAMIPRIL 10 MG/1
10 CAPSULE ORAL DAILY
Qty: 90 CAPSULE | Refills: 1 | Status: SHIPPED | OUTPATIENT
Start: 2023-07-18

## 2023-08-07 DIAGNOSIS — R35.0 BENIGN PROSTATIC HYPERPLASIA WITH URINARY FREQUENCY: ICD-10-CM

## 2023-08-07 DIAGNOSIS — N40.1 BENIGN PROSTATIC HYPERPLASIA WITH URINARY FREQUENCY: ICD-10-CM

## 2023-08-07 RX ORDER — TAMSULOSIN HYDROCHLORIDE 0.4 MG/1
0.4 CAPSULE ORAL DAILY
Qty: 90 CAPSULE | Refills: 0 | Status: SHIPPED | OUTPATIENT
Start: 2023-08-07

## 2023-08-17 DIAGNOSIS — I10 ESSENTIAL HYPERTENSION: ICD-10-CM

## 2023-08-17 RX ORDER — BISOPROLOL FUMARATE 5 MG/1
5 TABLET, FILM COATED ORAL DAILY
Qty: 90 TABLET | Refills: 0 | Status: SHIPPED | OUTPATIENT
Start: 2023-08-17

## 2023-08-21 DIAGNOSIS — I10 ESSENTIAL HYPERTENSION: ICD-10-CM

## 2023-08-21 RX ORDER — INDAPAMIDE 2.5 MG/1
2.5 TABLET ORAL DAILY
Qty: 90 TABLET | Refills: 0 | Status: SHIPPED | OUTPATIENT
Start: 2023-08-21

## 2023-09-08 LAB
25(OH)D3+25(OH)D2 SERPL-MCNC: 45.5 NG/ML (ref 30–100)
ALBUMIN SERPL-MCNC: 4.4 G/DL (ref 3.7–4.7)
ALBUMIN/GLOB SERPL: 1.8 {RATIO} (ref 1.2–2.2)
ALP SERPL-CCNC: 72 IU/L (ref 44–121)
ALT SERPL-CCNC: 39 IU/L (ref 0–44)
AST SERPL-CCNC: 27 IU/L (ref 0–40)
BASOPHILS # BLD AUTO: 0.1 X10E3/UL (ref 0–0.2)
BASOPHILS NFR BLD AUTO: 1 %
BILIRUB SERPL-MCNC: 0.7 MG/DL (ref 0–1.2)
BUN SERPL-MCNC: 26 MG/DL (ref 8–27)
BUN/CREAT SERPL: 19 (ref 10–24)
CALCIUM SERPL-MCNC: 9.2 MG/DL (ref 8.6–10.2)
CHLORIDE SERPL-SCNC: 104 MMOL/L (ref 96–106)
CHOLEST SERPL-MCNC: 186 MG/DL (ref 100–199)
CO2 SERPL-SCNC: 23 MMOL/L (ref 20–29)
CREAT SERPL-MCNC: 1.35 MG/DL (ref 0.76–1.27)
CRP SERPL-MCNC: 4 MG/L (ref 0–10)
EGFR: 51 ML/MIN/1.73
EOSINOPHIL # BLD AUTO: 0.3 X10E3/UL (ref 0–0.4)
EOSINOPHIL NFR BLD AUTO: 4 %
ERYTHROCYTE [DISTWIDTH] IN BLOOD BY AUTOMATED COUNT: 14.3 % (ref 11.6–15.4)
ERYTHROCYTE [SEDIMENTATION RATE] IN BLOOD BY WESTERGREN METHOD: 19 MM/HR (ref 0–30)
GLOBULIN SER-MCNC: 2.5 G/DL (ref 1.5–4.5)
GLUCOSE SERPL-MCNC: 110 MG/DL (ref 70–99)
HBA1C MFR BLD: 6.2 % (ref 4.8–5.6)
HCT VFR BLD AUTO: 39.2 % (ref 37.5–51)
HDLC SERPL-MCNC: 66 MG/DL
HGB BLD-MCNC: 12.8 G/DL (ref 13–17.7)
IMM GRANULOCYTES # BLD: 0 X10E3/UL (ref 0–0.1)
IMM GRANULOCYTES NFR BLD: 0 %
LDLC SERPL CALC-MCNC: 102 MG/DL (ref 0–99)
LYMPHOCYTES # BLD AUTO: 1.5 X10E3/UL (ref 0.7–3.1)
LYMPHOCYTES NFR BLD AUTO: 19 %
MAGNESIUM SERPL-MCNC: 1.6 MG/DL (ref 1.6–2.3)
MCH RBC QN AUTO: 29.6 PG (ref 26.6–33)
MCHC RBC AUTO-ENTMCNC: 32.7 G/DL (ref 31.5–35.7)
MCV RBC AUTO: 91 FL (ref 79–97)
MONOCYTES # BLD AUTO: 0.7 X10E3/UL (ref 0.1–0.9)
MONOCYTES NFR BLD AUTO: 9 %
NEUTROPHILS # BLD AUTO: 5.2 X10E3/UL (ref 1.4–7)
NEUTROPHILS NFR BLD AUTO: 67 %
PLATELET # BLD AUTO: 178 X10E3/UL (ref 150–450)
POTASSIUM SERPL-SCNC: 4.5 MMOL/L (ref 3.5–5.2)
PROT SERPL-MCNC: 6.9 G/DL (ref 6–8.5)
PTH-INTACT SERPL-MCNC: 30 PG/ML (ref 15–65)
RBC # BLD AUTO: 4.33 X10E6/UL (ref 4.14–5.8)
SL AMB VLDL CHOLESTEROL CALC: 18 MG/DL (ref 5–40)
SODIUM SERPL-SCNC: 141 MMOL/L (ref 134–144)
TRIGL SERPL-MCNC: 100 MG/DL (ref 0–149)
TSH SERPL DL<=0.005 MIU/L-ACNC: 5.08 UIU/ML (ref 0.45–4.5)
WBC # BLD AUTO: 7.8 X10E3/UL (ref 3.4–10.8)

## 2023-09-19 DIAGNOSIS — E61.1 LOW IRON: ICD-10-CM

## 2023-09-19 RX ORDER — FERROUS SULFATE 325(65) MG
TABLET ORAL
Qty: 12 TABLET | Refills: 0 | Status: SHIPPED | OUTPATIENT
Start: 2023-09-19

## 2023-09-26 ENCOUNTER — OFFICE VISIT (OUTPATIENT)
Dept: INTERNAL MEDICINE CLINIC | Facility: CLINIC | Age: 88
End: 2023-09-26
Payer: COMMERCIAL

## 2023-09-26 VITALS
HEART RATE: 80 BPM | TEMPERATURE: 97.6 F | DIASTOLIC BLOOD PRESSURE: 60 MMHG | WEIGHT: 200 LBS | SYSTOLIC BLOOD PRESSURE: 120 MMHG | HEIGHT: 66 IN | BODY MASS INDEX: 32.14 KG/M2 | RESPIRATION RATE: 13 BRPM

## 2023-09-26 DIAGNOSIS — Z23 ENCOUNTER FOR IMMUNIZATION: ICD-10-CM

## 2023-09-26 DIAGNOSIS — E03.8 SUBCLINICAL HYPOTHYROIDISM: ICD-10-CM

## 2023-09-26 DIAGNOSIS — I71.40 ABDOMINAL AORTIC ANEURYSM (AAA) WITHOUT RUPTURE, UNSPECIFIED PART (HCC): ICD-10-CM

## 2023-09-26 DIAGNOSIS — I73.9 PERIPHERAL VASCULAR DISEASE (HCC): ICD-10-CM

## 2023-09-26 DIAGNOSIS — E11.51 TYPE II DIABETES MELLITUS WITH PERIPHERAL CIRCULATORY DISORDER (HCC): Primary | ICD-10-CM

## 2023-09-26 DIAGNOSIS — I10 ESSENTIAL HYPERTENSION: ICD-10-CM

## 2023-09-26 DIAGNOSIS — E55.9 VITAMIN D DEFICIENCY: ICD-10-CM

## 2023-09-26 DIAGNOSIS — M35.3 POLYMYALGIA RHEUMATICA (HCC): ICD-10-CM

## 2023-09-26 DIAGNOSIS — N18.30 STAGE 3 CHRONIC KIDNEY DISEASE, UNSPECIFIED WHETHER STAGE 3A OR 3B CKD (HCC): ICD-10-CM

## 2023-09-26 DIAGNOSIS — E78.00 PURE HYPERCHOLESTEROLEMIA: ICD-10-CM

## 2023-09-26 PROCEDURE — G0008 ADMIN INFLUENZA VIRUS VAC: HCPCS | Performed by: INTERNAL MEDICINE

## 2023-09-26 PROCEDURE — 99214 OFFICE O/P EST MOD 30 MIN: CPT | Performed by: INTERNAL MEDICINE

## 2023-09-26 PROCEDURE — 90662 IIV NO PRSV INCREASED AG IM: CPT | Performed by: INTERNAL MEDICINE

## 2023-09-26 NOTE — PROGRESS NOTES
Assessment/Plan:      #1 osteoporosis according to the FRAX score risk. He has osteopenia on the DEXA scan he does take prednisone for his polymyalgia rheumatica that puts him at a risk for further osteoporosis we will send note to rheumatology Dr. Toby Salcedo for evaluation and chooses treatment like Prolia    2. We fill out the form for diabetic foot he does have poor circulation he has a history of type 2 diabetes he has some calluses. He qualifies for diabetic shoes and he has onychomycosis of the toenails    3. Blood pressures well control 120/60 here today. Number Prill 10 mg  Bisoprolol 5 mg  Amlodipine 10 mg    3. History of abdominal aortic aneurysm I think he should be follow-up with a vascular specialist he also has peripheral vascular disease. He has no history of claudication. Before for hyperlipidemia he is on atorvastatin 40 mg/day continue the same. 5.  For polymyalgia rheumatica he follows up with Dr. Toby Salcedo rheumatologist and he is on prednisone 5 mg daily    6. Chronic renal insufficiency BUN 26 creatinine of 1.35 with a GFR of 51 he maintained that since May of this year.     Labs reviewed    Results for orders placed or performed in visit on 09/07/23   CBC and differential   Result Value Ref Range    White Blood Cell Count 7.8 3.4 - 10.8 x10E3/uL    Red Blood Cell Count 4.33 4.14 - 5.80 x10E6/uL    Hemoglobin 12.8 (L) 13.0 - 17.7 g/dL    HCT 39.2 37.5 - 51.0 %    MCV 91 79 - 97 fL    MCH 29.6 26.6 - 33.0 pg    MCHC 32.7 31.5 - 35.7 g/dL    RDW 14.3 11.6 - 15.4 %    Platelet Count 483 507 - 450 x10E3/uL    Neutrophils 67 Not Estab. %    Lymphocytes 19 Not Estab. %    Monocytes 9 Not Estab. %    Eosinophils 4 Not Estab. %    Basophils PCT 1 Not Estab. %    Neutrophils (Absolute) 5.2 1.4 - 7.0 x10E3/uL    Lymphocytes (Absolute) 1.5 0.7 - 3.1 x10E3/uL    Monocytes (Absolute) 0.7 0.1 - 0.9 x10E3/uL    Eosinophils (Absolute) 0.3 0.0 - 0.4 x10E3/uL    Basophils ABS 0.1 0.0 - 0.2 x10E3/uL Immature Granulocytes 0 Not Estab. %    Immature Granulocytes (Absolute) 0.0 0.0 - 0.1 x10E3/uL   Comprehensive metabolic panel   Result Value Ref Range    Glucose, Random 110 (H) 70 - 99 mg/dL    BUN 26 8 - 27 mg/dL    Creatinine 1.35 (H) 0.76 - 1.27 mg/dL    eGFR 51 (L) >59 mL/min/1.73    SL AMB BUN/CREATININE RATIO 19 10 - 24    Sodium 141 134 - 144 mmol/L    Potassium 4.5 3.5 - 5.2 mmol/L    Chloride 104 96 - 106 mmol/L    CO2 23 20 - 29 mmol/L    CALCIUM 9.2 8.6 - 10.2 mg/dL    Protein, Total 6.9 6.0 - 8.5 g/dL    Albumin 4.4 3.7 - 4.7 g/dL    Globulin, Total 2.5 1.5 - 4.5 g/dL    Albumin/Globulin Ratio 1.8 1.2 - 2.2    TOTAL BILIRUBIN 0.7 0.0 - 1.2 mg/dL    Alk Phos Isoenzymes 72 44 - 121 IU/L    AST 27 0 - 40 IU/L    ALT 39 0 - 44 IU/L   Lipid panel   Result Value Ref Range    Cholesterol, Total 186 100 - 199 mg/dL    Triglycerides 100 0 - 149 mg/dL    HDL 66 >39 mg/dL    VLDL Cholesterol Calculated 18 5 - 40 mg/dL    LDL Calculated 102 (H) 0 - 99 mg/dL   Hemoglobin A1c (w/out EAG)   Result Value Ref Range    Hemoglobin A1C 6.2 (H) 4.8 - 5.6 %   TSH, 3rd generation   Result Value Ref Range    TSH 5.080 (H) 0.450 - 4.500 uIU/mL   Vitamin D 25 hydroxy   Result Value Ref Range    25-HYDROXY VIT D 45.5 30.0 - 100.0 ng/mL   Sedimentation rate, automated   Result Value Ref Range    Sedimentation Rate 19 0 - 30 mm/hr   Magnesium   Result Value Ref Range    Magnesium, Serum 1.6 1.6 - 2.3 mg/dL   C-reactive protein   Result Value Ref Range    C-Reactive Protein, Quant 4 0 - 10 mg/L   PTH, intact   Result Value Ref Range    PTH, Intact 30 15 - 65 pg/mL     No problem-specific Assessment & Plan notes found for this encounter.        Diagnoses and all orders for this visit:    Type II diabetes mellitus with peripheral circulatory disorder ([address])    Subclinical hypothyroidism    Peripheral vascular disease ([address])    Essential hypertension    Stage 3 chronic kidney disease, unspecified whether stage 3a or 3b CKD ([address])    Pure hypercholesterolemia    Vitamin D deficiency    Polymyalgia rheumatica ([address] [facility])          Subjective:      Patient ID: Constantin Carter is a 81 y.o. male. No chief complaint on file.         Current Outpatient Medications:   •  Cholecalciferol (Vitamin D3) 25 MCG (1000 UT) CAPS, Take 1 capsule Mon, Wed & Fri, Disp: 30 capsule, Rfl: 3  •  acetaminophen (TYLENOL) 325 mg tablet, Take 2 tablets (650 mg total) by mouth every 6 (six) hours as needed for mild pain, Disp: , Rfl:   •  amLODIPine (NORVASC) 10 mg tablet, Take 1 tablet (10 mg total) by mouth daily, Disp: 90 tablet, Rfl: 1  •  Ascorbic Acid (VITAMIN C PO), Take 1,000 mg by mouth daily, Disp: , Rfl:   •  aspirin 81 MG tablet, Take 1 tablet by mouth daily, Disp: , Rfl:   •  atorvastatin (LIPITOR) 40 mg tablet, Take 1 tablet daily, Disp: 90 tablet, Rfl: 1  •  betamethasone valerate (VALISONE) 0.1 % cream, Apply topically 2 (two) times a day, Disp: 30 g, Rfl: 0  •  bisoprolol (ZEBETA) 5 mg tablet, Take 1 tablet (5 mg total) by mouth daily, Disp: 90 tablet, Rfl: 0  •  Blood Pressure KIT, by Does not apply route 2 (two) times a week, Disp: 1 each, Rfl: 0  •  clotrimazole-betamethasone (LOTRISONE) 1-0.05 % cream, apply topically to affected area twice a day (Patient not taking: No sig reported), Disp: 30 g, Rfl: 1  •  ferrous sulfate (FeroSul) 325 (65 Fe) mg tablet, Take one tablet weekly, Disp: 12 tablet, Rfl: 0  •  folic acid (FOLVITE) 1 mg tablet, Take 1 tablet (1,000 mcg total) by mouth daily, Disp: 90 tablet, Rfl: 1  •  glucose blood test strip, by In Vitro route 2 (two) times a day, Disp: , Rfl:   •  indapamide (LOZOL) 2.5 mg tablet, Take 1 tablet (2.5 mg total) by mouth daily, Disp: 90 tablet, Rfl: 0  •  ketoconazole (NIZORAL) 2 % cream, Apply topically daily, Disp: 30 g, Rfl: 1  •  Lysine HCl 1000 MG TABS, Take 1,000 mg by mouth daily , Disp: , Rfl:   •  magnesium chloride-calcium (MAG-SR PLUS CALCIUM)  mg, Take 2 tablets by mouth daily, Disp: , Rfl:   • multivitamin-minerals (CENTRUM ADULTS) tablet, Take 1 tablet by mouth daily, Disp: , Rfl:   •  omeprazole (PriLOSEC) 40 MG capsule, Take 1 capsule (40 mg total) by mouth daily, Disp: 90 capsule, Rfl: 1  •  predniSONE 1 MG TBEC, 1 mg daily (Patient not taking: Reported on 5/23/2023), Disp: , Rfl:   •  predniSONE 5 mg tablet, Take by mouth daily, Disp: , Rfl:   •  ramipril (ALTACE) 10 MG capsule, Take 1 capsule (10 mg total) by mouth daily, Disp: 90 capsule, Rfl: 1  •  tamsulosin (FLOMAX) 0.4 mg, Take 1 capsule (0.4 mg total) by mouth daily, Disp: 90 capsule, Rfl: 0    HPI    The following portions of the patient's history were reviewed and updated as appropriate: allergies, current medications, past family history, past medical history, past social history, past surgical history and problem list.    Review of Systems   Constitutional: Negative. Negative for activity change, appetite change, fatigue, fever and unexpected weight change. HENT: Negative for congestion, ear pain, hearing loss, mouth sores, postnasal drip, rhinorrhea, sore throat, trouble swallowing and voice change. Eyes: Negative for pain, redness and visual disturbance. Respiratory: Negative for cough, chest tightness, shortness of breath and wheezing. Cardiovascular: Negative for chest pain, palpitations and leg swelling. Gastrointestinal: Negative for abdominal distention, abdominal pain, blood in stool, constipation, diarrhea and nausea. Endocrine: Negative for cold intolerance, heat intolerance, polydipsia, polyphagia and polyuria. Genitourinary: Negative for difficulty urinating, dysuria, flank pain, frequency, hematuria and urgency. Musculoskeletal: Negative for arthralgias, back pain, gait problem, joint swelling and myalgias. Skin: Negative for color change and pallor. Neurological: Negative for dizziness, tremors, seizures, syncope, weakness, numbness and headaches. Hematological: Negative for adenopathy.  Does not bruise/bleed easily. Psychiatric/Behavioral: Negative. Negative for sleep disturbance. The patient is not nervous/anxious. Objective:      Diabetic Foot Exam  Calluses and bunions bilateral  Patient's shoes and socks removed. Right Foot/Ankle   Right Foot Inspection  Skin Exam: skin normal. Skin not intact, no dry skin, no warmth, no callus, no erythema, no maceration, no abnormal color, no pre-ulcer, no ulcer and no callus. Toe Exam: ROM and strength within normal limits. No swelling, no tenderness, erythema and  no right toe deformity    Sensory   Vibration: intact  Proprioception: intact  Monofilament testing: intact    Vascular  The right DP pulse is 1+. The right PT pulse is 1+. Left Foot/Ankle  Left Foot Inspection  Skin Exam: skin normal. Skin not intact, no dry skin, no warmth, no erythema, no maceration, normal color, no pre-ulcer, no ulcer and no callus. Toe Exam: ROM and strength within normal limits. No swelling, no tenderness, no erythema and no left toe deformity. Sensory   Vibration: intact  Proprioception: intact  Monofilament testing: intact    Vascular  The left DP pulse is 1+. The left PT pulse is 1+. There were no vitals taken for this visit. Physical Exam  Vitals and nursing note reviewed. Constitutional:       Appearance: He is well-developed. HENT:      Head: Normocephalic. Right Ear: External ear normal.      Left Ear: External ear normal.      Nose: Nose normal.      Mouth/Throat:      Pharynx: No oropharyngeal exudate. Eyes:      Conjunctiva/sclera: Conjunctivae normal.      Pupils: Pupils are equal, round, and reactive to light. Neck:      Thyroid: No thyromegaly. Cardiovascular:      Rate and Rhythm: Normal rate and regular rhythm. Pulses:           Dorsalis pedis pulses are 1+ on the right side and 1+ on the left side. Posterior tibial pulses are 1+ on the right side and 1+ on the left side.       Heart sounds: Normal heart sounds. No murmur heard. No friction rub. No gallop. Comments: S1-S2 regular rhythm  Extremity +1 edema    Pulmonary:      Effort: Pulmonary effort is normal. No respiratory distress. Breath sounds: Normal breath sounds. No wheezing or rales. Comments: Lungs are clear no wheezing rales or rhonchi  Abdominal:      General: Bowel sounds are normal. There is no distension. Palpations: Abdomen is soft. There is no mass. Tenderness: There is no abdominal tenderness. There is no guarding or rebound. Musculoskeletal:         General: Normal range of motion. Cervical back: Normal range of motion and neck supple. Feet:      Right foot:      Skin integrity: No ulcer, skin breakdown, erythema, warmth, callus or dry skin. Left foot:      Skin integrity: No ulcer, skin breakdown, erythema, warmth, callus or dry skin. Lymphadenopathy:      Cervical: No cervical adenopathy. Skin:     General: Skin is warm and dry. Neurological:      Mental Status: He is alert and oriented to person, place, and time.    Psychiatric:         Behavior: Behavior normal.         Judgment: Judgment normal.

## 2023-10-17 DIAGNOSIS — K21.9 GASTROESOPHAGEAL REFLUX DISEASE WITHOUT ESOPHAGITIS: ICD-10-CM

## 2023-10-17 DIAGNOSIS — E78.00 PURE HYPERCHOLESTEROLEMIA: ICD-10-CM

## 2023-10-17 DIAGNOSIS — I10 ESSENTIAL HYPERTENSION: ICD-10-CM

## 2023-10-17 RX ORDER — ATORVASTATIN CALCIUM 40 MG/1
TABLET, FILM COATED ORAL
Qty: 90 TABLET | Refills: 1 | Status: SHIPPED | OUTPATIENT
Start: 2023-10-17

## 2023-10-17 RX ORDER — OMEPRAZOLE 40 MG/1
40 CAPSULE, DELAYED RELEASE ORAL DAILY
Qty: 90 CAPSULE | Refills: 1 | Status: SHIPPED | OUTPATIENT
Start: 2023-10-17

## 2023-10-17 RX ORDER — FOLIC ACID 1 MG/1
1000 TABLET ORAL DAILY
Qty: 90 TABLET | Refills: 1 | Status: SHIPPED | OUTPATIENT
Start: 2023-10-17

## 2023-10-17 RX ORDER — AMLODIPINE BESYLATE 10 MG/1
10 TABLET ORAL DAILY
Qty: 90 TABLET | Refills: 0 | Status: SHIPPED | OUTPATIENT
Start: 2023-10-17 | End: 2024-01-15

## 2023-11-01 LAB
LEFT EYE DIABETIC RETINOPATHY: NORMAL
RIGHT EYE DIABETIC RETINOPATHY: NORMAL

## 2023-11-14 DIAGNOSIS — I10 ESSENTIAL HYPERTENSION: ICD-10-CM

## 2023-11-14 RX ORDER — BISOPROLOL FUMARATE 5 MG/1
5 TABLET, FILM COATED ORAL DAILY
Qty: 90 TABLET | Refills: 0 | Status: SHIPPED | OUTPATIENT
Start: 2023-11-14

## 2023-11-21 DIAGNOSIS — N40.1 BENIGN PROSTATIC HYPERPLASIA WITH URINARY FREQUENCY: ICD-10-CM

## 2023-11-21 DIAGNOSIS — I10 ESSENTIAL HYPERTENSION: ICD-10-CM

## 2023-11-21 DIAGNOSIS — R35.0 BENIGN PROSTATIC HYPERPLASIA WITH URINARY FREQUENCY: ICD-10-CM

## 2023-11-21 RX ORDER — TAMSULOSIN HYDROCHLORIDE 0.4 MG/1
0.4 CAPSULE ORAL DAILY
Qty: 90 CAPSULE | Refills: 0 | Status: SHIPPED | OUTPATIENT
Start: 2023-11-21

## 2023-11-21 RX ORDER — INDAPAMIDE 2.5 MG/1
2.5 TABLET ORAL DAILY
Qty: 90 TABLET | Refills: 0 | Status: SHIPPED | OUTPATIENT
Start: 2023-11-21

## 2023-12-14 DIAGNOSIS — E61.1 LOW IRON: ICD-10-CM

## 2023-12-14 RX ORDER — FERROUS SULFATE 325(65) MG
TABLET ORAL
Qty: 12 TABLET | Refills: 0 | Status: SHIPPED | OUTPATIENT
Start: 2023-12-14

## 2024-01-15 DIAGNOSIS — I10 ESSENTIAL HYPERTENSION: ICD-10-CM

## 2024-01-16 RX ORDER — RAMIPRIL 10 MG/1
10 CAPSULE ORAL DAILY
Qty: 90 CAPSULE | Refills: 1 | Status: SHIPPED | OUTPATIENT
Start: 2024-01-16

## 2024-01-16 RX ORDER — AMLODIPINE BESYLATE 10 MG/1
10 TABLET ORAL DAILY
Qty: 90 TABLET | Refills: 0 | Status: SHIPPED | OUTPATIENT
Start: 2024-01-16 | End: 2024-04-15

## 2024-01-31 ENCOUNTER — TELEPHONE (OUTPATIENT)
Dept: VASCULAR SURGERY | Facility: CLINIC | Age: 89
End: 2024-01-31

## 2024-02-07 DIAGNOSIS — I10 ESSENTIAL HYPERTENSION: ICD-10-CM

## 2024-02-07 RX ORDER — BISOPROLOL FUMARATE 5 MG/1
5 TABLET, FILM COATED ORAL DAILY
Qty: 90 TABLET | Refills: 1 | Status: SHIPPED | OUTPATIENT
Start: 2024-02-07

## 2024-02-15 DIAGNOSIS — I10 ESSENTIAL HYPERTENSION: ICD-10-CM

## 2024-02-15 DIAGNOSIS — N40.1 BENIGN PROSTATIC HYPERPLASIA WITH URINARY FREQUENCY: ICD-10-CM

## 2024-02-15 DIAGNOSIS — R35.0 BENIGN PROSTATIC HYPERPLASIA WITH URINARY FREQUENCY: ICD-10-CM

## 2024-02-15 RX ORDER — INDAPAMIDE 2.5 MG/1
2.5 TABLET ORAL DAILY
Qty: 90 TABLET | Refills: 0 | Status: SHIPPED | OUTPATIENT
Start: 2024-02-15

## 2024-02-15 RX ORDER — TAMSULOSIN HYDROCHLORIDE 0.4 MG/1
0.4 CAPSULE ORAL DAILY
Qty: 90 CAPSULE | Refills: 0 | Status: SHIPPED | OUTPATIENT
Start: 2024-02-15

## 2024-02-20 LAB
25(OH)D3+25(OH)D2 SERPL-MCNC: 42.5 NG/ML (ref 30–100)
ALBUMIN SERPL-MCNC: 4.3 G/DL (ref 3.7–4.7)
ALBUMIN/CREAT UR: 932 MG/G CREAT (ref 0–29)
ALBUMIN/GLOB SERPL: 1.7 {RATIO} (ref 1.2–2.2)
ALP SERPL-CCNC: 61 IU/L (ref 44–121)
ALT SERPL-CCNC: 24 IU/L (ref 0–44)
AST SERPL-CCNC: 23 IU/L (ref 0–40)
BASOPHILS # BLD AUTO: 0.1 X10E3/UL (ref 0–0.2)
BASOPHILS NFR BLD AUTO: 1 %
BILIRUB SERPL-MCNC: 0.8 MG/DL (ref 0–1.2)
BUN SERPL-MCNC: 27 MG/DL (ref 8–27)
BUN/CREAT SERPL: 21 (ref 10–24)
CALCIUM SERPL-MCNC: 9.3 MG/DL (ref 8.6–10.2)
CHLORIDE SERPL-SCNC: 102 MMOL/L (ref 96–106)
CO2 SERPL-SCNC: 22 MMOL/L (ref 20–29)
CREAT SERPL-MCNC: 1.28 MG/DL (ref 0.76–1.27)
CREAT UR-MCNC: 92.4 MG/DL
CRP SERPL-MCNC: 78 MG/L (ref 0–10)
EGFR: 54 ML/MIN/1.73
EOSINOPHIL # BLD AUTO: 0.2 X10E3/UL (ref 0–0.4)
EOSINOPHIL NFR BLD AUTO: 3 %
ERYTHROCYTE [DISTWIDTH] IN BLOOD BY AUTOMATED COUNT: 13.7 % (ref 11.6–15.4)
ERYTHROCYTE [SEDIMENTATION RATE] IN BLOOD BY WESTERGREN METHOD: 59 MM/HR (ref 0–30)
GLOBULIN SER-MCNC: 2.6 G/DL (ref 1.5–4.5)
GLUCOSE SERPL-MCNC: 123 MG/DL (ref 70–99)
HBA1C MFR BLD: 6.9 % (ref 4.8–5.6)
HCT VFR BLD AUTO: 37.1 % (ref 37.5–51)
HGB BLD-MCNC: 12.2 G/DL (ref 13–17.7)
IMM GRANULOCYTES # BLD: 0 X10E3/UL (ref 0–0.1)
IMM GRANULOCYTES NFR BLD: 1 %
LYMPHOCYTES # BLD AUTO: 1.3 X10E3/UL (ref 0.7–3.1)
LYMPHOCYTES NFR BLD AUTO: 17 %
MAGNESIUM SERPL-MCNC: 1.5 MG/DL (ref 1.6–2.3)
MCH RBC QN AUTO: 29 PG (ref 26.6–33)
MCHC RBC AUTO-ENTMCNC: 32.9 G/DL (ref 31.5–35.7)
MCV RBC AUTO: 88 FL (ref 79–97)
MICROALBUMIN UR-MCNC: 860.8 UG/ML
MONOCYTES # BLD AUTO: 0.7 X10E3/UL (ref 0.1–0.9)
MONOCYTES NFR BLD AUTO: 9 %
NEUTROPHILS # BLD AUTO: 5.6 X10E3/UL (ref 1.4–7)
NEUTROPHILS NFR BLD AUTO: 69 %
PLATELET # BLD AUTO: 192 X10E3/UL (ref 150–450)
POTASSIUM SERPL-SCNC: 4.2 MMOL/L (ref 3.5–5.2)
PROT SERPL-MCNC: 6.9 G/DL (ref 6–8.5)
RBC # BLD AUTO: 4.21 X10E6/UL (ref 4.14–5.8)
SL AMB T4, FREE (DIRECT): 1.26 NG/DL (ref 0.82–1.77)
SODIUM SERPL-SCNC: 140 MMOL/L (ref 134–144)
TSH SERPL DL<=0.005 MIU/L-ACNC: 4.83 UIU/ML (ref 0.45–4.5)
WBC # BLD AUTO: 7.9 X10E3/UL (ref 3.4–10.8)

## 2024-02-27 ENCOUNTER — OFFICE VISIT (OUTPATIENT)
Dept: INTERNAL MEDICINE CLINIC | Facility: CLINIC | Age: 89
End: 2024-02-27
Payer: COMMERCIAL

## 2024-02-27 VITALS
WEIGHT: 194.6 LBS | BODY MASS INDEX: 31.27 KG/M2 | OXYGEN SATURATION: 95 % | TEMPERATURE: 97.4 F | DIASTOLIC BLOOD PRESSURE: 72 MMHG | SYSTOLIC BLOOD PRESSURE: 130 MMHG | HEIGHT: 66 IN | HEART RATE: 70 BPM

## 2024-02-27 DIAGNOSIS — D69.3 IDIOPATHIC THROMBOCYTOPENIC PURPURA (HCC): ICD-10-CM

## 2024-02-27 DIAGNOSIS — E55.9 VITAMIN D DEFICIENCY: ICD-10-CM

## 2024-02-27 DIAGNOSIS — M81.0 AGE-RELATED OSTEOPOROSIS WITHOUT CURRENT PATHOLOGICAL FRACTURE: ICD-10-CM

## 2024-02-27 DIAGNOSIS — E11.51 TYPE II DIABETES MELLITUS WITH PERIPHERAL CIRCULATORY DISORDER (HCC): Primary | ICD-10-CM

## 2024-02-27 DIAGNOSIS — I73.9 PERIPHERAL VASCULAR DISEASE (HCC): ICD-10-CM

## 2024-02-27 DIAGNOSIS — E78.00 PURE HYPERCHOLESTEROLEMIA: ICD-10-CM

## 2024-02-27 DIAGNOSIS — M35.3 POLYMYALGIA RHEUMATICA (HCC): ICD-10-CM

## 2024-02-27 DIAGNOSIS — N18.30 STAGE 3 CHRONIC KIDNEY DISEASE, UNSPECIFIED WHETHER STAGE 3A OR 3B CKD (HCC): ICD-10-CM

## 2024-02-27 DIAGNOSIS — M81.0 AGE RELATED OSTEOPOROSIS, UNSPECIFIED PATHOLOGICAL FRACTURE PRESENCE: ICD-10-CM

## 2024-02-27 DIAGNOSIS — I10 ESSENTIAL HYPERTENSION: ICD-10-CM

## 2024-02-27 PROCEDURE — 99214 OFFICE O/P EST MOD 30 MIN: CPT | Performed by: INTERNAL MEDICINE

## 2024-02-27 NOTE — PATIENT INSTRUCTIONS
To improve your glucose control and your kidney function and protect your heart we will going to start you on Jardiance 10 mg daily if your insurance does not cover Jardiance there is 2 other options Invokana or faxiga      For your stomach acidity you been taking omeprazole 40 mg for quite a long time we will see if you can wean off it by taking 40 mg every other day then 40 mg every second day for 1 week each and then take it on as-needed basis

## 2024-02-27 NOTE — PROGRESS NOTES
Assessment/Plan: New medication Jardiance to better control the diabetes and to protect the kidneys.  Will send the DEXA scan to rheumatology        Osteopenia with a risk factor for osteoporosis due to the fact that he has polymyalgia rheumatica and is on prednisone we will going to repeat the DEXA scan compared to the one 2 years ago alert rheumatology to see if he is a candidate for another medications like Prolia or Fosamax.    2.  Blood pressure is very well-controlled on the following medications  Ramipril 10 mg  Bisoprolol 5 mg  Amlodipine 10 mg  Indapamide 2.5 mg    #3 gastroesophageal reflux disease has been on omeprazole for quite some time we will going to ask him to try to wean off the omeprazole if he can by taking every other day for 1 week every second day for 1 week and stop and just take it as needed      #4 polymyalgia rheumatica is on prednisone now taking 5 mg/day and the inflammatory markers have gone up.  He is going to see the rheumatologist soon      #5 peripheral vascular disease history of abdominal aortic aneurysm repair continue follow-up with vascular once a year  Ocular continue to follow-up to screen for ultrasound of the aorta      Will give you the booster and pneumonia #20                        Diabetes consider metformin for treatment he is not taking any diabetic medications.  Hemoglobin A1c is 6.9    Need to send a report of the DEXA scan to Dr. Milian has been on long-term prednisone consider Fosamax prophylaxis    Results for orders placed or performed in visit on 02/19/24   Tanika Zamudio Default   Result Value Ref Range    White Blood Cell Count 7.9 3.4 - 10.8 x10E3/uL    Red Blood Cell Count 4.21 4.14 - 5.80 x10E6/uL    Hemoglobin 12.2 (L) 13.0 - 17.7 g/dL    HCT 37.1 (L) 37.5 - 51.0 %    MCV 88 79 - 97 fL    MCH 29.0 26.6 - 33.0 pg    MCHC 32.9 31.5 - 35.7 g/dL    RDW 13.7 11.6 - 15.4 %    Platelet Count 192 150 - 450 x10E3/uL    Neutrophils 69 Not Estab. %    Lymphocytes  17 Not Estab. %    Monocytes 9 Not Estab. %    Eosinophils 3 Not Estab. %    Basophils PCT 1 Not Estab. %    Neutrophils (Absolute) 5.6 1.4 - 7.0 x10E3/uL    Lymphocytes (Absolute) 1.3 0.7 - 3.1 x10E3/uL    Monocytes (Absolute) 0.7 0.1 - 0.9 x10E3/uL    Eosinophils (Absolute) 0.2 0.0 - 0.4 x10E3/uL    Basophils ABS 0.1 0.0 - 0.2 x10E3/uL    Immature Granulocytes 1 Not Estab. %    Immature Granulocytes (Absolute) 0.0 0.0 - 0.1 x10E3/uL   Comprehensive metabolic panel   Result Value Ref Range    Glucose, Random 123 (H) 70 - 99 mg/dL    BUN 27 8 - 27 mg/dL    Creatinine 1.28 (H) 0.76 - 1.27 mg/dL    eGFR 54 (L) >59 mL/min/1.73    SL AMB BUN/CREATININE RATIO 21 10 - 24    Sodium 140 134 - 144 mmol/L    Potassium 4.2 3.5 - 5.2 mmol/L    Chloride 102 96 - 106 mmol/L    CO2 22 20 - 29 mmol/L    CALCIUM 9.3 8.6 - 10.2 mg/dL    Protein, Total 6.9 6.0 - 8.5 g/dL    Albumin 4.3 3.7 - 4.7 g/dL    Globulin, Total 2.6 1.5 - 4.5 g/dL    Albumin/Globulin Ratio 1.7 1.2 - 2.2    TOTAL BILIRUBIN 0.8 0.0 - 1.2 mg/dL    Alk Phos Isoenzymes 61 44 - 121 IU/L    AST 23 0 - 40 IU/L    ALT 24 0 - 44 IU/L   Albumin / creatinine urine ratio   Result Value Ref Range    Creatinine, Urine 92.4 Not Estab. mg/dL    Albumin,U,Random 860.8 Not Estab. ug/mL    Microalb/Creat Ratio 932 (H) 0 - 29 mg/g creat   Hemoglobin A1c (w/out EAG)   Result Value Ref Range    Hemoglobin A1C 6.9 (H) 4.8 - 5.6 %   Vitamin D 25 hydroxy   Result Value Ref Range    25-HYDROXY VIT D 42.5 30.0 - 100.0 ng/mL   TSH WITH REFLEX TO FREE T4   Result Value Ref Range    TSH 4.830 (H) 0.450 - 4.500 uIU/mL   T4, free   Result Value Ref Range    T4,Free (Direct) 1.26 0.82 - 1.77 ng/dL   Sedimentation rate, automated   Result Value Ref Range    Sedimentation Rate 59 (H) 0 - 30 mm/hr   Magnesium   Result Value Ref Range    Magnesium, Serum 1.5 (L) 1.6 - 2.3 mg/dL   C-reactive protein   Result Value Ref Range    C-Reactive Protein, Quant 78 (H) 0 - 10 mg/L                    No  problem-specific Assessment & Plan notes found for this encounter.         Problem List Items Addressed This Visit          Endocrine    Type II diabetes mellitus with peripheral circulatory disorder (HCC) - Primary       Cardiovascular and Mediastinum    Peripheral vascular disease (HCC)    Essential hypertension       Hematopoietic and Hemostatic    Idiopathic thrombocytopenic purpura (HCC)       Genitourinary    Stage 3 chronic kidney disease, unspecified whether stage 3a or 3b CKD (HCC)       Other    Pure hypercholesterolemia    Vitamin D deficiency    Polymyalgia rheumatica (HCC)         Subjective:      Patient ID: Too Burch is a 89 y.o. male.    No chief complaint on file.        Current Outpatient Medications:     acetaminophen (TYLENOL) 325 mg tablet, Take 2 tablets (650 mg total) by mouth every 6 (six) hours as needed for mild pain, Disp: , Rfl:     amLODIPine (NORVASC) 10 mg tablet, Take 1 tablet (10 mg total) by mouth daily, Disp: 90 tablet, Rfl: 0    Ascorbic Acid (VITAMIN C PO), Take 1,000 mg by mouth daily, Disp: , Rfl:     aspirin 81 MG tablet, Take 1 tablet by mouth daily, Disp: , Rfl:     atorvastatin (LIPITOR) 40 mg tablet, Take 1 tablet daily, Disp: 90 tablet, Rfl: 1    betamethasone valerate (VALISONE) 0.1 % cream, Apply topically 2 (two) times a day, Disp: 30 g, Rfl: 0    bisoprolol (ZEBETA) 5 mg tablet, Take 1 tablet (5 mg total) by mouth daily, Disp: 90 tablet, Rfl: 1    Blood Pressure KIT, by Does not apply route 2 (two) times a week, Disp: 1 each, Rfl: 0    Cholecalciferol (Vitamin D3) 25 MCG (1000 UT) CAPS, Take 1 capsule Mon, Wed & Fri, Disp: 30 capsule, Rfl: 3    clotrimazole-betamethasone (LOTRISONE) 1-0.05 % cream, apply topically to affected area twice a day (Patient not taking: No sig reported), Disp: 30 g, Rfl: 1    ferrous sulfate (FeroSul) 325 (65 Fe) mg tablet, Take one tablet weekly, Disp: 12 tablet, Rfl: 0    folic acid (FOLVITE) 1 mg tablet, Take 1 tablet (1,000 mcg  total) by mouth daily, Disp: 90 tablet, Rfl: 1    glucose blood test strip, by In Vitro route 2 (two) times a day, Disp: , Rfl:     indapamide (LOZOL) 2.5 mg tablet, Take 1 tablet (2.5 mg total) by mouth daily, Disp: 90 tablet, Rfl: 0    ketoconazole (NIZORAL) 2 % cream, Apply topically daily, Disp: 30 g, Rfl: 1    Lysine HCl 1000 MG TABS, Take 1,000 mg by mouth daily , Disp: , Rfl:     magnesium chloride-calcium (MAG-SR PLUS CALCIUM)  mg, Take 2 tablets by mouth daily, Disp: , Rfl:     multivitamin-minerals (CENTRUM ADULTS) tablet, Take 1 tablet by mouth daily, Disp: , Rfl:     omeprazole (PriLOSEC) 40 MG capsule, Take 1 capsule (40 mg total) by mouth daily, Disp: 90 capsule, Rfl: 1    predniSONE 1 MG TBEC, 1 mg daily (Patient not taking: Reported on 5/23/2023), Disp: , Rfl:     predniSONE 5 mg tablet, Take by mouth daily, Disp: , Rfl:     ramipril (ALTACE) 10 MG capsule, Take 1 capsule (10 mg total) by mouth daily, Disp: 90 capsule, Rfl: 1    tamsulosin (FLOMAX) 0.4 mg, Take 1 capsule (0.4 mg total) by mouth daily, Disp: 90 capsule, Rfl: 0    HPI    The following portions of the patient's history were reviewed and updated as appropriate: allergies, current medications, past family history, past medical history, past social history, past surgical history, and problem list.    Review of Systems   Constitutional: Negative.  Negative for activity change, appetite change, fatigue, fever and unexpected weight change.   HENT:  Negative for congestion, ear pain, hearing loss, mouth sores, postnasal drip, rhinorrhea, sore throat, trouble swallowing and voice change.    Eyes:  Negative for pain, redness and visual disturbance.   Respiratory:  Negative for cough, chest tightness, shortness of breath and wheezing.    Cardiovascular:  Negative for chest pain, palpitations and leg swelling.   Gastrointestinal:  Negative for abdominal distention, abdominal pain, blood in stool, constipation, diarrhea and nausea.    Endocrine: Negative for cold intolerance, heat intolerance, polydipsia, polyphagia and polyuria.   Genitourinary:  Negative for difficulty urinating, dysuria, flank pain, frequency, hematuria and urgency.   Musculoskeletal:  Negative for arthralgias, back pain, gait problem, joint swelling and myalgias.   Skin:  Negative for color change and pallor.        Rubor in the.  Pretibia area of the right legs is there for couple of months   Neurological:  Negative for dizziness, tremors, seizures, syncope, weakness, numbness and headaches.   Hematological:  Negative for adenopathy. Does not bruise/bleed easily.   Psychiatric/Behavioral: Negative.  Negative for sleep disturbance. The patient is not nervous/anxious.          Objective:      Diabetic Foot Exam    Patient's shoes and socks removed.    Right Foot/Ankle   Right Foot Inspection  Skin Exam: skin normal. Skin not intact, no dry skin, no warmth, no callus, no erythema, no maceration, no abnormal color, no pre-ulcer, no ulcer and no callus.     Toe Exam: ROM and strength within normal limits. No swelling, no tenderness, erythema and  no right toe deformity    Sensory   Vibration: intact  Proprioception: intact  Monofilament testing: intact    Vascular  The right DP pulse is 1+. The right PT pulse is 1+.     Left Foot/Ankle  Left Foot Inspection  Skin Exam: skin normal. Skin not intact, no dry skin, no warmth, no erythema, no maceration, normal color, no pre-ulcer, no ulcer and no callus.     Toe Exam: ROM and strength within normal limits. No swelling, no tenderness, no erythema and no left toe deformity.     Sensory   Vibration: intact  Proprioception: intact  Monofilament testing: intact    Vascular  The left DP pulse is 1+. The left PT pulse is 1+.     Assign Risk Category  No deformity present  No loss of protective sensation  Weak pulses  Risk: 1      There were no vitals taken for this visit.     Physical Exam  Constitutional:       Appearance: He is  well-developed.   HENT:      Head: Normocephalic.      Right Ear: External ear normal.      Left Ear: External ear normal.      Nose: Nose normal.      Mouth/Throat:      Pharynx: No oropharyngeal exudate.   Eyes:      Conjunctiva/sclera: Conjunctivae normal.      Pupils: Pupils are equal, round, and reactive to light.   Neck:      Thyroid: No thyromegaly.   Cardiovascular:      Rate and Rhythm: Normal rate and regular rhythm.      Pulses: Pulses are weak.           Dorsalis pedis pulses are 1+ on the right side and 1+ on the left side.        Posterior tibial pulses are 1+ on the right side and 1+ on the left side.      Heart sounds: Normal heart sounds. No murmur heard.     No friction rub. No gallop.      Comments: S1-S2 regular rhythm  Extremities no edema    Pulmonary:      Effort: Pulmonary effort is normal. No respiratory distress.      Breath sounds: Normal breath sounds. No wheezing or rales.      Comments: Lungs are clear no wheezing rales or rhonchi  Abdominal:      General: Bowel sounds are normal. There is no distension.      Palpations: Abdomen is soft. There is no mass.      Tenderness: There is no abdominal tenderness. There is no guarding or rebound.      Comments: Abdomen soft nontender   Musculoskeletal:         General: Normal range of motion.      Cervical back: Normal range of motion and neck supple.   Feet:      Right foot:      Skin integrity: No ulcer, skin breakdown, erythema, warmth, callus or dry skin.      Left foot:      Skin integrity: No ulcer, skin breakdown, erythema, warmth, callus or dry skin.   Lymphadenopathy:      Cervical: No cervical adenopathy.   Skin:     General: Skin is warm and dry.   Neurological:      Mental Status: He is alert and oriented to person, place, and time.   Psychiatric:         Behavior: Behavior normal.         Judgment: Judgment normal.

## 2024-03-05 DIAGNOSIS — E55.9 VITAMIN D DEFICIENCY: ICD-10-CM

## 2024-03-27 ENCOUNTER — HOSPITAL ENCOUNTER (OUTPATIENT)
Dept: BONE DENSITY | Facility: IMAGING CENTER | Age: 89
Discharge: HOME | End: 2024-03-27
Payer: COMMERCIAL

## 2024-03-27 VITALS — WEIGHT: 188.6 LBS | BODY MASS INDEX: 32.2 KG/M2 | HEIGHT: 64 IN

## 2024-03-27 DIAGNOSIS — M81.0 AGE-RELATED OSTEOPOROSIS WITHOUT CURRENT PATHOLOGICAL FRACTURE: ICD-10-CM

## 2024-03-27 DIAGNOSIS — M35.3 POLYMYALGIA RHEUMATICA (HCC): ICD-10-CM

## 2024-03-27 DIAGNOSIS — E55.9 VITAMIN D DEFICIENCY: ICD-10-CM

## 2024-03-27 PROCEDURE — 77080 DXA BONE DENSITY AXIAL: CPT

## 2024-03-29 NOTE — RESULT ENCOUNTER NOTE
Please call the patient regarding his abnormal result.dexa osteopenia increase risk  Fx hip  is 3.7% candidate for osteoporosis Tx  please send report to rheumatologist in [facility]. He is out of network of [facility]  he lives [place] but commutes to [facility] to get primary care close to home send note to patient  thanks

## 2024-04-11 ENCOUNTER — TELEPHONE (OUTPATIENT)
Dept: INTERNAL MEDICINE CLINIC | Facility: CLINIC | Age: 89
End: 2024-04-11

## 2024-04-11 NOTE — TELEPHONE ENCOUNTER
----- Message from Óscar Giang MD sent at 3/29/2024  2:52 PM EDT -----  Please call the patient regarding his abnormal result.dexa osteopenia increase risk  Fx hip  is 3.7% candidate for osteoporosis Tx  please send report to rheumatologist in [facility]. He is out of network of [facility]  he lives [place] but commutes to [facility] to get primary care close to home send note to patient  thanks

## 2024-04-22 ENCOUNTER — CONSULT (OUTPATIENT)
Dept: VASCULAR SURGERY | Facility: CLINIC | Age: 89
End: 2024-04-22
Payer: COMMERCIAL

## 2024-04-22 VITALS
HEIGHT: 64 IN | HEART RATE: 72 BPM | SYSTOLIC BLOOD PRESSURE: 128 MMHG | BODY MASS INDEX: 32.27 KG/M2 | DIASTOLIC BLOOD PRESSURE: 68 MMHG | WEIGHT: 189 LBS | OXYGEN SATURATION: 93 %

## 2024-04-22 DIAGNOSIS — Z98.890 S/P ENDOVASCULAR ANEURYSM REPAIR: Primary | ICD-10-CM

## 2024-04-22 DIAGNOSIS — Z86.79 S/P ENDOVASCULAR ANEURYSM REPAIR: Primary | ICD-10-CM

## 2024-04-22 DIAGNOSIS — I10 ESSENTIAL HYPERTENSION: ICD-10-CM

## 2024-04-22 DIAGNOSIS — I73.9 PERIPHERAL VASCULAR DISEASE (HCC): ICD-10-CM

## 2024-04-22 DIAGNOSIS — I77.89 ECTASIA OF ARTERY (HCC): ICD-10-CM

## 2024-04-22 DIAGNOSIS — I71.40 ABDOMINAL AORTIC ANEURYSM (AAA) WITHOUT RUPTURE, UNSPECIFIED PART (HCC): ICD-10-CM

## 2024-04-22 PROCEDURE — 99203 OFFICE O/P NEW LOW 30 MIN: CPT | Performed by: SURGERY

## 2024-04-22 NOTE — PROGRESS NOTES
Assessment/Plan:    Ectasia of artery (HCC)  History of popliteal ectasia bilaterally (R -1.2cm, L1.0cm, based on 2020 LEAD. Denies claudication, rest pain or tissue loss.    -We discussed the pathophysiology of aneurysmal disease, available treatment options and indications for treatment. Discussed criteria for intervention including size>=2cm, thromboembolic complications or compressive symptoms.  -Discussed signs and symptoms of symptomatic popliteal aneurysm   -Recommend continued surveillance with LEAD now.        S/P endovascular aneurysm repair  AAA s/p EVAR 2007 by Dr. Florence and revision with aortic cuff extension and R renal artery stent placement in 2019 ([facility]). He denies abdominal or back pain and has no complaints.    EVAR duplex 2021 - widely patent endograft without evidence of endoleak. Sac size is 6.78 cm. (Prior 6.9  cm)  The aorta proximal to the graft measures 1.8 cm  The iliac arteries distal to the graft measures 1.2 cm (right) 1.3 cm (left)  Unable to visualize the renal arteries bilaterally.  Ankle/Brachial indices: Rt - 1.27 (Prior 1.36 ) and Lt - 1.23 (Prior 1.26 )  Great toe pressure of 80 mmHg (right) 111 mmHg (left), within the healing  range.    -We discussed the pathophysiology of aneurysmal disease, management and surveillance after EVAR.  -Recommend repeat EVAR duplex now. Last duplex in 2021 demonstrated decreasing aneurysm sac size without endoleak. Renal arteries were not visualized. Labwork demonstrates stable kidney function with CKD stage 3a.  -Follow-up to review duplex studies and discuss continued surveillance pending findings.       Diagnoses and all orders for this visit:    S/P endovascular aneurysm repair  -      VAS EVAR DUPLEX EVALUATION; Future    Peripheral vascular disease (HCC)  -     Ambulatory Referral to Vascular Surgery  -     VAS ARTERIAL DUPLEX- LOWER LIMB BILATERAL; Future    Essential hypertension  -     Ambulatory Referral to Vascular Surgery    Abdominal  "aortic aneurysm (AAA) without rupture, unspecified part (HCC)  -     Ambulatory Referral to Vascular Surgery    Ectasia of artery (HCC)        I have spent 38 minutes with Patient  today in which greater than 50% of this time was spent in counseling/coordination of care regarding Intructions for management, Importance of tx compliance and Impressions.    Subjective:      Patient ID: Too Burch is a 89 y.o. male.    Patient is here for a follow up exam. Patient is s/p a EVAR done in 2007 by Dr. Florence. Patient is also s/p a revision EVAR with aortic extension cuff and a right renal stent placement done 7/1/2019 by Dr. Reyes. His last EVAR duplex was done 9/14/2021 and his last REY was done 7/19/2022. He has had no new studies. Patient denies any abd pain, abd pain after eating or any low back pain. He denies any leg pain. He is taking ASA 81 mg. He is a former smoker.     HPI  Mr. Burch is an 89yo male former smoker with HTN, GERD, polymyalgia rheumatica, popliteal ectasia, AAA s/p EVAR 2007 ([facility]) and revision with aortic cuff extension and R renal stent placement 2019 ([facility]) who returns to re-establish care. He denies any abdominal or back pain. He has no claudication, rest pain or tissue loss.    The following portions of the patient's history were reviewed and updated as appropriate: allergies, current medications, past family history, past medical history, past social history, past surgical history, and problem list.    I have reviewed and made appropriate changes to the review of systems input by the medical assistant.    Vitals:    04/22/24 1550   BP: 128/68   BP Location: Left arm   Patient Position: Sitting   Cuff Size: Standard   Pulse: 72   SpO2: 93%   Weight: 85.7 kg (189 lb)   Height: 5' 4\" (1.626 m)       Patient Active Problem List   Diagnosis    Type II diabetes mellitus with peripheral circulatory disorder (HCC)    Peripheral vascular disease (HCC)    AAA (abdominal aortic aneurysm) without " rupture (HCC)    Essential hypertension    Pure hypercholesterolemia    Endoleak post (EVAR) endovascular aneurysm repair, initial encounter    History of acute inferior wall MI    Preop cardiovascular exam    Vitamin D deficiency    Subclinical hypothyroidism    Polymyalgia rheumatica (HCC)    Guaiac positive stools    Idiopathic thrombocytopenic purpura (HCC)    Stage 3 chronic kidney disease, unspecified whether stage 3a or 3b CKD (HCC)    Numbness and tingling in left arm    Osteoarthritis of spine with radiculopathy, cervical region    Cervical spinal stenosis    Carpal tunnel syndrome of left wrist    COVID-19    S/P endovascular aneurysm repair    Ectasia of artery (HCC)       Past Surgical History:   Procedure Laterality Date    CARDIAC SURGERY      CATARACT EXTRACTION Bilateral     CORONARY ANGIOPLASTY WITH STENT PLACEMENT      IR EVAR      IR EVAR  2019    OTHER SURGICAL HISTORY      detatched bicep tendon    [facility] EVASC RPR DPLMNT AORTO-AORTIC NDGFT N/A 2019    Procedure: REVISION EVAR WITH AORTIC EXTENSION CUFF X3 WITH [id number] GORE, RIGHT RENAL STENTING WITH 6X16mm iCASt;  Surgeon: Cody Reyes MD;  Location: [facility];  Service: Vascular       Family History   Problem Relation Age of Onset    Anemia Mother     No Known Problems Father     Colon cancer Maternal Grandmother        Social History     Socioeconomic History    Marital status: /Civil Union     Spouse name: Not on file    Number of children: Not on file    Years of education: Not on file    Highest education level: Not on file   Occupational History    Not on file   Tobacco Use    Smoking status: Former     Current packs/day: 0.00     Average packs/day: 3.0 packs/day for 30.0 years (90.0 ttl pk-yrs)     Types: Cigarettes     Start date:      Quit date:      Years since quittin.3     Passive exposure: Past    Smokeless tobacco: Never    Tobacco comments:     quit around the 80's   Vaping Use    Vaping status: Never  Used   Substance and Sexual Activity    Alcohol use: Yes     Comment: soically    Drug use: Never    Sexual activity: Not Currently   Other Topics Concern    Not on file   Social History Narrative    Not on file     Social Determinants of Health     Financial Resource Strain: Low Risk  (5/16/2023)    Overall Financial Resource Strain (CARDIA)     Difficulty of Paying Living Expenses: Not hard at all   Food Insecurity: Not on file   Transportation Needs: No Transportation Needs (5/16/2023)    PRAPARE - Transportation     Lack of Transportation (Medical): No     Lack of Transportation (Non-Medical): No   Physical Activity: Not on file   Stress: Not on file   Social Connections: Not on file   Intimate Partner Violence: Not on file   Housing Stability: Not on file       Allergies   Allergen Reactions    Sulfamethoxazole-Trimethoprim Nausea Only         Current Outpatient Medications:     acetaminophen (TYLENOL) 325 mg tablet, Take 2 tablets (650 mg total) by mouth every 6 (six) hours as needed for mild pain, Disp: , Rfl:     amLODIPine (NORVASC) 10 mg tablet, Take 1 tablet (10 mg total) by mouth daily, Disp: 90 tablet, Rfl: 0    Ascorbic Acid (VITAMIN C PO), Take 1,000 mg by mouth daily, Disp: , Rfl:     aspirin 81 MG tablet, Take 1 tablet by mouth daily, Disp: , Rfl:     atorvastatin (LIPITOR) 40 mg tablet, Take 1 tablet daily, Disp: 90 tablet, Rfl: 1    betamethasone valerate (VALISONE) 0.1 % cream, Apply topically 2 (two) times a day, Disp: 30 g, Rfl: 0    bisoprolol (ZEBETA) 5 mg tablet, Take 1 tablet (5 mg total) by mouth daily, Disp: 90 tablet, Rfl: 1    Blood Pressure KIT, by Does not apply route 2 (two) times a week, Disp: 1 each, Rfl: 0    Cholecalciferol (Vitamin D3) 25 MCG (1000 UT) capsule, Take 1 capsule Mon, Wed & Fri, Disp: 30 capsule, Rfl: 1    ferrous sulfate (FeroSul) 325 (65 Fe) mg tablet, Take one tablet weekly, Disp: 12 tablet, Rfl: 0    folic acid (FOLVITE) 1 mg tablet, Take 1 tablet (1,000 mcg  total) by mouth daily, Disp: 90 tablet, Rfl: 1    glucose blood test strip, by In Vitro route 2 (two) times a day, Disp: , Rfl:     indapamide (LOZOL) 2.5 mg tablet, Take 1 tablet (2.5 mg total) by mouth daily, Disp: 90 tablet, Rfl: 0    ketoconazole (NIZORAL) 2 % cream, Apply topically daily, Disp: 30 g, Rfl: 1    Lysine HCl 1000 MG TABS, Take 1,000 mg by mouth daily , Disp: , Rfl:     magnesium chloride-calcium (MAG-SR PLUS CALCIUM)  mg, Take 2 tablets by mouth daily, Disp: , Rfl:     multivitamin-minerals (CENTRUM ADULTS) tablet, Take 1 tablet by mouth daily, Disp: , Rfl:     omeprazole (PriLOSEC) 40 MG capsule, Take 1 capsule (40 mg total) by mouth daily, Disp: 90 capsule, Rfl: 1    predniSONE 5 mg tablet, Take by mouth daily, Disp: , Rfl:     ramipril (ALTACE) 10 MG capsule, Take 1 capsule (10 mg total) by mouth daily, Disp: 90 capsule, Rfl: 1    tamsulosin (FLOMAX) 0.4 mg, Take 1 capsule (0.4 mg total) by mouth daily, Disp: 90 capsule, Rfl: 0    clotrimazole-betamethasone (LOTRISONE) 1-0.05 % cream, apply topically to affected area twice a day (Patient not taking: No sig reported), Disp: 30 g, Rfl: 1    Empagliflozin (JARDIANCE) 10 MG TABS tablet, Take 1 tablet (10 mg total) by mouth daily (Patient not taking: Reported on 4/22/2024), Disp: 30 tablet, Rfl: 5    predniSONE 1 MG TBEC, 1 mg daily (Patient not taking: Reported on 5/23/2023), Disp: , Rfl:     Review of Systems   Constitutional: Negative.    HENT: Negative.     Eyes: Negative.    Respiratory: Negative.     Cardiovascular: Negative.    Gastrointestinal: Negative.    Endocrine: Negative.    Genitourinary: Negative.    Musculoskeletal: Negative.    Skin: Negative.    Allergic/Immunologic: Negative.    Neurological: Negative.    Hematological: Negative.    Psychiatric/Behavioral: Negative.         I have personally reviewed the ROS entered by MA and agree as documented.    Objective:      /68 (BP Location: Left arm, Patient Position: Sitting,  "Cuff Size: Standard)   Pulse 72   Ht 5' 4\" (1.626 m)   Wt 85.7 kg (189 lb)   SpO2 93%   BMI 32.44 kg/m²          Physical Exam  Constitutional:       Appearance: Normal appearance. He is obese.   HENT:      Head: Normocephalic and atraumatic.   Cardiovascular:      Rate and Rhythm: Normal rate.      Pulses:           Popliteal pulses are 2+ on the right side and 3+ on the left side.        Dorsalis pedis pulses are 2+ on the right side and 2+ on the left side.        Posterior tibial pulses are 2+ on the right side and 2+ on the left side.   Pulmonary:      Effort: Pulmonary effort is normal.   Abdominal:      General: There is no distension.      Palpations: Abdomen is soft.      Tenderness: There is no abdominal tenderness.   Musculoskeletal:         General: Normal range of motion.      Cervical back: Normal range of motion and neck supple.   Skin:     General: Skin is warm and dry.      Capillary Refill: Capillary refill takes less than 2 seconds.   Neurological:      General: No focal deficit present.      Mental Status: He is alert and oriented to person, place, and time.   Psychiatric:         Mood and Affect: Mood normal.         Behavior: Behavior normal.         Thought Content: Thought content normal.         Judgment: Judgment normal.           "

## 2024-04-22 NOTE — PATIENT INSTRUCTIONS
Ectasia of artery (HCC)  History of popliteal ectasia bilaterally (R -1.2cm, L1.0cm, based on 2020 LEAD. Denies claudication, rest pain or tissue loss.     -We discussed the pathophysiology of aneurysmal disease, available treatment options and indications for treatment. Discussed criteria for intervention including size>=2cm, thromboembolic complications or compressive symptoms.  -Discussed signs and symptoms of symptomatic popliteal aneurysm   -Recommend continued surveillance with LEAD now.           S/P endovascular aneurysm repair  AAA s/p EVAR 2007 by Dr. Florence and revision with aortic cuff extension and R renal artery stent placement in 2019 ([facility]). He denies abdominal or back pain and has no complaints.     EVAR duplex 2021 - widely patent endograft without evidence of endoleak. Sac size is 6.78 cm. (Prior 6.9  cm)  The aorta proximal to the graft measures 1.8 cm  The iliac arteries distal to the graft measures 1.2 cm (right) 1.3 cm (left)  Unable to visualize the renal arteries bilaterally.  Ankle/Brachial indices: Rt - 1.27 (Prior 1.36 ) and Lt - 1.23 (Prior 1.26 )  Great toe pressure of 80 mmHg (right) 111 mmHg (left), within the healing  range.     -We discussed the pathophysiology of aneurysmal disease, management and surveillance after EVAR.  -Recommend repeat EVAR duplex now. Last duplex in 2021 demonstrated decreasing aneurysm sac size without endoleak. Renal arteries were not visualized. Labwork demonstrates stable kidney function with CKD stage 3a.  -Follow-up to review duplex studies and discuss continued surveillance pending findings.

## 2024-04-22 NOTE — ASSESSMENT & PLAN NOTE
AAA s/p EVAR 2007 by Dr. Florence and revision with aortic cuff extension and R renal artery stent placement in 2019 ([facility]). He denies abdominal or back pain and has no complaints.    EVAR duplex 2021 - widely patent endograft without evidence of endoleak. Sac size is 6.78 cm. (Prior 6.9  cm)  The aorta proximal to the graft measures 1.8 cm  The iliac arteries distal to the graft measures 1.2 cm (right) 1.3 cm (left)  Unable to visualize the renal arteries bilaterally.  Ankle/Brachial indices: Rt - 1.27 (Prior 1.36 ) and Lt - 1.23 (Prior 1.26 )  Great toe pressure of 80 mmHg (right) 111 mmHg (left), within the healing  range.    -We discussed the pathophysiology of aneurysmal disease, management and surveillance after EVAR.  -Recommend repeat EVAR duplex now. Last duplex in 2021 demonstrated decreasing aneurysm sac size without endoleak. Renal arteries were not visualized. Labwork demonstrates stable kidney function with CKD stage 3a.  -Follow-up to review duplex studies and discuss continued surveillance pending findings.

## 2024-04-22 NOTE — LETTER
April 22, 2024     Óscar Giang MD    Patient: Too Burch   YOB: 1935   Date of Visit: 4/22/2024       Dear Dr. Giang:    Thank you for referring Too Burch to me for evaluation. Below are the relevant portions of my assessment and plan of care.    Diagnoses and all orders for this visit:    Ectasia of artery (HCC)  History of popliteal ectasia bilaterally (R -1.2cm, L1.0cm, based on 2020 LEAD. Denies claudication, rest pain or tissue loss.     -We discussed the pathophysiology of aneurysmal disease, available treatment options and indications for treatment. Discussed criteria for intervention including size>=2cm, thromboembolic complications or compressive symptoms.  -Discussed signs and symptoms of symptomatic popliteal aneurysm   -Recommend continued surveillance with LEAD now.           S/P endovascular aneurysm repair  AAA s/p EVAR 2007 by Dr. Florence and revision with aortic cuff extension and R renal artery stent placement in 2019 ([facility]). He denies abdominal or back pain and has no complaints.     EVAR duplex 2021 - widely patent endograft without evidence of endoleak. Sac size is 6.78 cm. (Prior 6.9  cm)  The aorta proximal to the graft measures 1.8 cm  The iliac arteries distal to the graft measures 1.2 cm (right) 1.3 cm (left)  Unable to visualize the renal arteries bilaterally.  Ankle/Brachial indices: Rt - 1.27 (Prior 1.36 ) and Lt - 1.23 (Prior 1.26 )  Great toe pressure of 80 mmHg (right) 111 mmHg (left), within the healing  range.     -We discussed the pathophysiology of aneurysmal disease, management and surveillance after EVAR.  -Recommend repeat EVAR duplex now. Last duplex in 2021 demonstrated decreasing aneurysm sac size without endoleak. Renal arteries were not visualized. Labwork demonstrates stable kidney function with CKD stage 3a.  -Follow-up to review duplex studies and discuss continued surveillance pending findings.        If you have questions, please do not  hesitate to call me. I look forward to following Too along with you.         Sincerely,        Wendy Macdonald MD        CC: No Recipients

## 2024-04-22 NOTE — ASSESSMENT & PLAN NOTE
History of popliteal ectasia bilaterally (R -1.2cm, L1.0cm, based on 2020 LEAD. Denies claudication, rest pain or tissue loss.    -We discussed the pathophysiology of aneurysmal disease, available treatment options and indications for treatment. Discussed criteria for intervention including size>=2cm, thromboembolic complications or compressive symptoms.  -Discussed signs and symptoms of symptomatic popliteal aneurysm   -Recommend continued surveillance with LEAD now.

## 2024-04-24 LAB
25(OH)D3+25(OH)D2 SERPL-MCNC: 50.9 NG/ML (ref 30–100)
ALBUMIN SERPL-MCNC: 4.1 G/DL (ref 3.7–4.7)
ALBUMIN/CREAT UR: 336 MG/G CREAT (ref 0–29)
ALBUMIN/GLOB SERPL: 1.6 {RATIO} (ref 1.2–2.2)
ALP SERPL-CCNC: 75 IU/L (ref 44–121)
ALT SERPL-CCNC: 18 IU/L (ref 0–44)
APPEARANCE UR: CLEAR
AST SERPL-CCNC: 19 IU/L (ref 0–40)
BACTERIA URNS QL MICRO: NORMAL
BASOPHILS # BLD AUTO: 0.1 X10E3/UL (ref 0–0.2)
BASOPHILS NFR BLD AUTO: 2 %
BILIRUB SERPL-MCNC: 0.6 MG/DL (ref 0–1.2)
BILIRUB UR QL STRIP: NEGATIVE
BUN SERPL-MCNC: 28 MG/DL (ref 8–27)
BUN/CREAT SERPL: 20 (ref 10–24)
CALCIUM SERPL-MCNC: 9.6 MG/DL (ref 8.6–10.2)
CASTS URNS QL MICRO: NORMAL /LPF
CHLORIDE SERPL-SCNC: 103 MMOL/L (ref 96–106)
CHOLEST SERPL-MCNC: 164 MG/DL (ref 100–199)
CO2 SERPL-SCNC: 23 MMOL/L (ref 20–29)
COLOR UR: YELLOW
CREAT SERPL-MCNC: 1.42 MG/DL (ref 0.76–1.27)
CREAT UR-MCNC: 78.5 MG/DL
EGFR: 48 ML/MIN/1.73
EOSINOPHIL # BLD AUTO: 1 X10E3/UL (ref 0–0.4)
EOSINOPHIL NFR BLD AUTO: 15 %
EPI CELLS #/AREA URNS HPF: NORMAL /HPF (ref 0–10)
ERYTHROCYTE [DISTWIDTH] IN BLOOD BY AUTOMATED COUNT: 14.6 % (ref 11.6–15.4)
GLOBULIN SER-MCNC: 2.6 G/DL (ref 1.5–4.5)
GLUCOSE SERPL-MCNC: 120 MG/DL (ref 70–99)
GLUCOSE UR QL: NEGATIVE
HBA1C MFR BLD: 6.7 % (ref 4.8–5.6)
HCT VFR BLD AUTO: 36.5 % (ref 37.5–51)
HDLC SERPL-MCNC: 37 MG/DL
HGB BLD-MCNC: 11.6 G/DL (ref 13–17.7)
HGB UR QL STRIP: NEGATIVE
IMM GRANULOCYTES # BLD: 0 X10E3/UL (ref 0–0.1)
IMM GRANULOCYTES NFR BLD: 0 %
KETONES UR QL STRIP: NEGATIVE
LDLC SERPL CALC-MCNC: 102 MG/DL (ref 0–99)
LEUKOCYTE ESTERASE UR QL STRIP: NEGATIVE
LYMPHOCYTES # BLD AUTO: 1.1 X10E3/UL (ref 0.7–3.1)
LYMPHOCYTES NFR BLD AUTO: 15 %
MAGNESIUM SERPL-MCNC: 1.6 MG/DL (ref 1.6–2.3)
MCH RBC QN AUTO: 28.2 PG (ref 26.6–33)
MCHC RBC AUTO-ENTMCNC: 31.8 G/DL (ref 31.5–35.7)
MCV RBC AUTO: 89 FL (ref 79–97)
MICRO URNS: ABNORMAL
MICROALBUMIN UR-MCNC: 263.8 UG/ML
MONOCYTES # BLD AUTO: 0.8 X10E3/UL (ref 0.1–0.9)
MONOCYTES NFR BLD AUTO: 11 %
NEUTROPHILS # BLD AUTO: 4 X10E3/UL (ref 1.4–7)
NEUTROPHILS NFR BLD AUTO: 57 %
NITRITE UR QL STRIP: NEGATIVE
PH UR STRIP: 5.5 [PH] (ref 5–7.5)
PHOSPHATE SERPL-MCNC: 2.9 MG/DL (ref 2.8–4.1)
PLATELET # BLD AUTO: 256 X10E3/UL (ref 150–450)
POTASSIUM SERPL-SCNC: 4.5 MMOL/L (ref 3.5–5.2)
PROT SERPL-MCNC: 6.7 G/DL (ref 6–8.5)
PROT UR QL STRIP: ABNORMAL
PTH-INTACT SERPL-MCNC: 23 PG/ML (ref 15–65)
RBC # BLD AUTO: 4.12 X10E6/UL (ref 4.14–5.8)
RBC #/AREA URNS HPF: NORMAL /HPF (ref 0–2)
SL AMB VLDL CHOLESTEROL CALC: 25 MG/DL (ref 5–40)
SODIUM SERPL-SCNC: 142 MMOL/L (ref 134–144)
SP GR UR: 1.02 (ref 1–1.03)
TRIGL SERPL-MCNC: 137 MG/DL (ref 0–149)
TSH SERPL DL<=0.005 MIU/L-ACNC: 4.57 UIU/ML (ref 0.45–4.5)
UROBILINOGEN UR STRIP-ACNC: 0.2 MG/DL (ref 0.2–1)
WBC # BLD AUTO: 7.1 X10E3/UL (ref 3.4–10.8)
WBC #/AREA URNS HPF: NORMAL /HPF (ref 0–5)

## 2024-05-13 DIAGNOSIS — N40.1 BENIGN PROSTATIC HYPERPLASIA WITH URINARY FREQUENCY: ICD-10-CM

## 2024-05-13 DIAGNOSIS — R35.0 BENIGN PROSTATIC HYPERPLASIA WITH URINARY FREQUENCY: ICD-10-CM

## 2024-05-13 DIAGNOSIS — I10 ESSENTIAL HYPERTENSION: ICD-10-CM

## 2024-05-13 RX ORDER — INDAPAMIDE 2.5 MG/1
2.5 TABLET ORAL DAILY
Qty: 90 TABLET | Refills: 0 | Status: SHIPPED | OUTPATIENT
Start: 2024-05-13

## 2024-05-13 RX ORDER — TAMSULOSIN HYDROCHLORIDE 0.4 MG/1
0.4 CAPSULE ORAL DAILY
Qty: 90 CAPSULE | Refills: 0 | Status: SHIPPED | OUTPATIENT
Start: 2024-05-13

## 2024-05-14 ENCOUNTER — TELEPHONE (OUTPATIENT)
Dept: INTERNAL MEDICINE CLINIC | Facility: CLINIC | Age: 89
End: 2024-05-14

## 2024-05-14 NOTE — TELEPHONE ENCOUNTER
I called Too in reference to prescription renewals to see if he was following with one of our doctors since Dr. Giang retired.  He said he has an appt July 1st with Dr. Lopez in [place].

## 2024-05-22 NOTE — TELEPHONE ENCOUNTER
05/22/24 4:43 PM        Did the patient transfer out of network or did the patient move out of the area?      Thank you  Fernanda Levy

## 2024-06-14 ENCOUNTER — HOSPITAL ENCOUNTER (OUTPATIENT)
Dept: NON INVASIVE DIAGNOSTICS | Age: 89
Discharge: HOME | End: 2024-06-14
Payer: COMMERCIAL

## 2024-06-14 DIAGNOSIS — Z98.890 S/P ENDOVASCULAR ANEURYSM REPAIR: ICD-10-CM

## 2024-06-14 DIAGNOSIS — I73.9 PERIPHERAL VASCULAR DISEASE (HCC): ICD-10-CM

## 2024-06-14 DIAGNOSIS — Z86.79 S/P ENDOVASCULAR ANEURYSM REPAIR: ICD-10-CM

## 2024-06-14 PROCEDURE — 93925 LOWER EXTREMITY STUDY: CPT | Performed by: SURGERY

## 2024-06-14 PROCEDURE — 93978 VASCULAR STUDY: CPT

## 2024-06-14 PROCEDURE — 93978 VASCULAR STUDY: CPT | Performed by: SURGERY

## 2024-06-14 PROCEDURE — 93923 UPR/LXTR ART STDY 3+ LVLS: CPT

## 2024-06-14 PROCEDURE — 93925 LOWER EXTREMITY STUDY: CPT

## 2024-06-14 PROCEDURE — 93922 UPR/L XTREMITY ART 2 LEVELS: CPT | Performed by: SURGERY

## 2024-07-08 ENCOUNTER — OFFICE VISIT (OUTPATIENT)
Dept: VASCULAR SURGERY | Facility: CLINIC | Age: 89
End: 2024-07-08
Payer: COMMERCIAL

## 2024-07-08 VITALS
DIASTOLIC BLOOD PRESSURE: 62 MMHG | SYSTOLIC BLOOD PRESSURE: 124 MMHG | HEART RATE: 77 BPM | HEIGHT: 64 IN | OXYGEN SATURATION: 95 % | BODY MASS INDEX: 31.41 KG/M2 | WEIGHT: 184 LBS

## 2024-07-08 DIAGNOSIS — Z86.79 S/P ENDOVASCULAR ANEURYSM REPAIR: ICD-10-CM

## 2024-07-08 DIAGNOSIS — I77.89 ECTASIA OF ARTERY (HCC): Primary | ICD-10-CM

## 2024-07-08 DIAGNOSIS — Z98.890 S/P ENDOVASCULAR ANEURYSM REPAIR: ICD-10-CM

## 2024-07-08 PROCEDURE — 99213 OFFICE O/P EST LOW 20 MIN: CPT | Performed by: SURGERY

## 2024-07-08 NOTE — ASSESSMENT & PLAN NOTE
AAA s/p EVAR 2007 by Dr. Florence and revision with aortic cuff extension and R renal artery stent placement in 2019 ([facility]). He denies abdominal or back pain and has no complaints.    EVAR duplex - 6.6x6.5 cm residual AAA sac (previous 6.8cm)    -We discussed the pathophysiology of aneurysmal disease s/p EVAR, management/treatment, surveillance.  -Residual AAA sac size is slightly decreased in size. No evidence of endoleak.  -Recommend continued surveillance with abdominal aorta/iliac duplex in 1 year with follow-up.

## 2024-07-08 NOTE — PROGRESS NOTES
Ambulatory Visit  Name: Too Burch      : 1935      MRN: [id number]  Encounter Provider: Wendy Macdonald MD  Encounter Date: 2024   Encounter department: [facility] [place]    Assessment & Plan   1. Ectasia of artery (HCC)  Assessment & Plan:  Bilateral popliteal ectasia. No claudication, rest pain or tissue loss.    LEAD - stable popliteal ectasia at 1cm bilaterally, unreliable [place] bilaterally with adequate MTP/GTP for healing    -We discussed the pathophysiology of popliteal aneurysmal disease, available treatment options and indications for treatment. Discussed criteria for intervention including size>=2cm, thromboembolic complications, symptomatic aneurysm or rupture.   -Discussed signs and symptoms of symptomatic popliteal aneurysm and associated thromboembolic complications and advised to call the office or go to the ED if he experiences any symptoms.  -Recommend continued surveillance with lower extremity arterial duplex in 1 year with follow-up.  Orders:  -     VAS ARTERIAL DUPLEX- LOWER LIMB BILATERAL; Future; Expected date: 2025  2. S/P endovascular aneurysm repair  Assessment & Plan:  AAA s/p EVAR 2007 by Dr. Florence and revision with aortic cuff extension and R renal artery stent placement in 2019 ([facility]). He denies abdominal or back pain and has no complaints.    EVAR duplex - 6.6x6.5 cm residual AAA sac (previous 6.8cm)    -We discussed the pathophysiology of aneurysmal disease s/p EVAR, management/treatment, surveillance.  -Residual AAA sac size is slightly decreased in size. No evidence of endoleak.  -Recommend continued surveillance with abdominal aorta/iliac duplex in 1 year with follow-up.  Orders:  -      VAS EVAR DUPLEX EVALUATION; Future; Expected date: 2025      History of Present Illness     Patient is here today to review results of a EVAR duplex and REY done 2024. Patient is s/p a EVAR done in  by Dr. Folrence. He is also s/p a Revision EVAR  "w/aortic extension cuff and right renal stenting done 7/1/2019 by Dr. Reyes. Patient denies any ABD pain, ABD pain after eating or any pain in his legs when walking. He c/o chronic low back pain and right leg swelling. Patient is taking ASA 81 mg and Atorvastatin. Patient is a former smoker.         See assessment & plan    Review of Systems   Constitutional: Negative.    HENT: Negative.     Eyes: Negative.    Respiratory: Negative.     Cardiovascular:  Positive for leg swelling.   Gastrointestinal: Negative.    Endocrine: Negative.    Genitourinary: Negative.    Musculoskeletal:  Positive for back pain.   Skin: Negative.    Allergic/Immunologic: Negative.    Neurological: Negative.    Hematological: Negative.    Psychiatric/Behavioral: Negative.       I have personally reviewed the ROS entered by MA and agree as documented.    Objective     /62 (BP Location: Left arm, Patient Position: Sitting, Cuff Size: Standard)   Pulse 77   Ht 5' 4\" (1.626 m)   Wt 83.5 kg (184 lb)   SpO2 95%   BMI 31.58 kg/m²     Physical Exam  Constitutional:       Appearance: Normal appearance.   HENT:      Head: Normocephalic and atraumatic.   Cardiovascular:      Rate and Rhythm: Normal rate.      Pulses: Normal pulses.   Pulmonary:      Effort: Pulmonary effort is normal.   Abdominal:      General: There is no distension.      Palpations: Abdomen is soft.      Tenderness: There is no abdominal tenderness.   Musculoskeletal:         General: Normal range of motion.      Cervical back: Normal range of motion and neck supple.   Skin:     General: Skin is warm and dry.      Capillary Refill: Capillary refill takes less than 2 seconds.   Neurological:      General: No focal deficit present.      Mental Status: He is alert and oriented to person, place, and time.   Psychiatric:         Mood and Affect: Mood normal.         Behavior: Behavior normal.         Thought Content: Thought content normal.         Judgment: Judgment normal. "       Administrative Statements   I have spent a total time of 25 minutes in caring for this patient on the day of the visit/encounter including Diagnostic results, Prognosis, Risks and benefits of tx options, Instructions for management, Patient and family education, Importance of tx compliance, Risk factor reductions, Impressions, Counseling / Coordination of care, Documenting in the medical record, Reviewing / ordering tests, medicine, procedures  , and Obtaining or reviewing history  .

## 2024-07-08 NOTE — PATIENT INSTRUCTIONS
1. Ectasia of artery (HCC)  Assessment & Plan:  Bilateral popliteal ectasia. No claudication, rest pain or tissue loss.     LEAD - stable popliteal ectasia at 1cm bilaterally, unreliable [place] bilaterally with adequate MTP/GTP for healing     -We discussed the pathophysiology of popliteal aneurysmal disease, available treatment options and indications for treatment. Discussed criteria for intervention including size>=2cm, thromboembolic complications, symptomatic aneurysm or rupture.   -Discussed signs and symptoms of symptomatic popliteal aneurysm and associated thromboembolic complications and advised to call the office or go to the ED if he experiences any symptoms.  -Recommend continued surveillance with lower extremity arterial duplex in 1 year with follow-up.  Orders:  -     VAS ARTERIAL DUPLEX- LOWER LIMB BILATERAL; Future; Expected date: 06/09/2025  2. S/P endovascular aneurysm repair  Assessment & Plan:  AAA s/p EVAR 2007 by Dr. Florence and revision with aortic cuff extension and R renal artery stent placement in 2019 ([facility]). He denies abdominal or back pain and has no complaints.     EVAR duplex - 6.6x6.5 cm residual AAA sac (previous 6.8cm)     -We discussed the pathophysiology of aneurysmal disease s/p EVAR, management/treatment, surveillance.  -Residual AAA sac size is slightly decreased in size. No evidence of endoleak.  -Recommend continued surveillance with abdominal aorta/iliac duplex in 1 year with follow-up.  Orders:  -      VAS EVAR DUPLEX EVALUATION; Future; Expected date: 06/09/2025

## 2024-07-08 NOTE — ASSESSMENT & PLAN NOTE
Bilateral popliteal ectasia. No claudication, rest pain or tissue loss.    LEAD - stable popliteal ectasia at 1cm bilaterally, unreliable WELLINGTON bilaterally with adequate MTP/GTP for healing    -We discussed the pathophysiology of popliteal aneurysmal disease, available treatment options and indications for treatment. Discussed criteria for intervention including size>=2cm, thromboembolic complications, symptomatic aneurysm or rupture.   -Discussed signs and symptoms of symptomatic popliteal aneurysm and associated thromboembolic complications and advised to call the office or go to the ED if he experiences any symptoms.  -Recommend continued surveillance with lower extremity arterial duplex in 1 year with follow-up.

## 2024-09-01 ENCOUNTER — HOSPITAL ENCOUNTER (INPATIENT)
Dept: HOSPITAL 99 - 2 NORTH | Age: 89
LOS: 1 days | Discharge: HOME | DRG: 871 | End: 2024-09-02
Payer: COMMERCIAL

## 2024-09-01 VITALS — DIASTOLIC BLOOD PRESSURE: 68 MMHG | RESPIRATION RATE: 20 BRPM | SYSTOLIC BLOOD PRESSURE: 141 MMHG

## 2024-09-01 VITALS — SYSTOLIC BLOOD PRESSURE: 141 MMHG | DIASTOLIC BLOOD PRESSURE: 68 MMHG

## 2024-09-01 VITALS — RESPIRATION RATE: 24 BRPM

## 2024-09-01 VITALS — RESPIRATION RATE: 16 BRPM

## 2024-09-01 VITALS — RESPIRATION RATE: 20 BRPM | DIASTOLIC BLOOD PRESSURE: 67 MMHG | SYSTOLIC BLOOD PRESSURE: 150 MMHG

## 2024-09-01 VITALS — RESPIRATION RATE: 24 BRPM | DIASTOLIC BLOOD PRESSURE: 67 MMHG | SYSTOLIC BLOOD PRESSURE: 150 MMHG

## 2024-09-01 VITALS — BODY MASS INDEX: 28.2 KG/M2 | BODY MASS INDEX: 29.2 KG/M2

## 2024-09-01 VITALS — RESPIRATION RATE: 19 BRPM

## 2024-09-01 VITALS — RESPIRATION RATE: 22 BRPM

## 2024-09-01 DIAGNOSIS — M35.3: ICD-10-CM

## 2024-09-01 DIAGNOSIS — K21.9: ICD-10-CM

## 2024-09-01 DIAGNOSIS — G93.41: ICD-10-CM

## 2024-09-01 DIAGNOSIS — Z66: ICD-10-CM

## 2024-09-01 DIAGNOSIS — U07.1: ICD-10-CM

## 2024-09-01 DIAGNOSIS — I12.9: ICD-10-CM

## 2024-09-01 DIAGNOSIS — E11.22: ICD-10-CM

## 2024-09-01 DIAGNOSIS — A41.89: Primary | ICD-10-CM

## 2024-09-01 DIAGNOSIS — D50.9: ICD-10-CM

## 2024-09-01 DIAGNOSIS — J96.01: ICD-10-CM

## 2024-09-01 DIAGNOSIS — J98.11: ICD-10-CM

## 2024-09-01 DIAGNOSIS — N40.0: ICD-10-CM

## 2024-09-01 DIAGNOSIS — E78.00: ICD-10-CM

## 2024-09-01 DIAGNOSIS — N18.31: ICD-10-CM

## 2024-09-01 DIAGNOSIS — Z79.82: ICD-10-CM

## 2024-09-01 DIAGNOSIS — I25.10: ICD-10-CM

## 2024-09-01 DIAGNOSIS — Z79.52: ICD-10-CM

## 2024-09-01 LAB
ALBUMIN SERPL-MCNC: 4.2 G/DL (ref 3.5–5)
ALP SERPL-CCNC: 71 U/L (ref 38–126)
ALT SERPL-CCNC: 23 U/L (ref 0–50)
AST SERPL-CCNC: 27 U/L (ref 17–59)
BUN SERPL-MCNC: 29 MG/DL (ref 9–20)
CALCIUM SERPL-MCNC: 9.2 MG/DL (ref 8.4–10.2)
CHLORIDE SERPL-SCNC: 101 MMOL/L (ref 98–107)
CO2 SERPL-SCNC: 27 MMOL/L (ref 22–30)
EGFR: 58.17
ERYTHROCYTE [DISTWIDTH] IN BLOOD BY AUTOMATED COUNT: 16.4 % (ref 11.5–14.5)
ESTIMATED CREATININE CLEARANCE: 38 ML/MIN
GLUCOSE SERPL-MCNC: 160 MG/DL (ref 70–99)
HCT VFR BLD AUTO: 35.6 % (ref 39–52)
HGB BLD-MCNC: 12.1 G/DL (ref 13–18)
MCHC RBC AUTO-ENTMCNC: 34 G/DL (ref 33–37)
MCV RBC AUTO: 84.8 FL (ref 80–94)
NRBC BLD AUTO-RTO: 0 %
PLATELET # BLD AUTO: 172 10^3/UL (ref 130–400)
POTASSIUM SERPL-SCNC: 4.4 MMOL/L (ref 3.5–5.1)
PROT SERPL-MCNC: 6.7 G/DL (ref 6.3–8.2)
SODIUM SERPL-SCNC: 140 MMOL/L (ref 135–145)

## 2024-09-01 RX ADMIN — DEXAMETHASONE SODIUM PHOSPHATE 6 MG: 4 INJECTION, SOLUTION INTRA-ARTICULAR; INTRALESIONAL; INTRAMUSCULAR; INTRAVENOUS; SOFT TISSUE at 23:11

## 2024-09-01 RX ADMIN — ONDANSETRON HYDROCHLORIDE 4 MG: 2 SOLUTION INTRAMUSCULAR; INTRAVENOUS at 23:06

## 2024-09-01 RX ADMIN — ACETAMINOPHEN 1000 MG: 500 TABLET ORAL at 23:06

## 2024-09-02 VITALS — SYSTOLIC BLOOD PRESSURE: 109 MMHG | RESPIRATION RATE: 18 BRPM | DIASTOLIC BLOOD PRESSURE: 56 MMHG

## 2024-09-02 VITALS — SYSTOLIC BLOOD PRESSURE: 118 MMHG | RESPIRATION RATE: 20 BRPM | DIASTOLIC BLOOD PRESSURE: 62 MMHG

## 2024-09-02 VITALS — RESPIRATION RATE: 22 BRPM | SYSTOLIC BLOOD PRESSURE: 138 MMHG | DIASTOLIC BLOOD PRESSURE: 65 MMHG

## 2024-09-02 VITALS — HEART RATE: 70 BPM | DIASTOLIC BLOOD PRESSURE: 60 MMHG | OXYGEN SATURATION: 92 % | SYSTOLIC BLOOD PRESSURE: 119 MMHG

## 2024-09-02 VITALS — RESPIRATION RATE: 20 BRPM | SYSTOLIC BLOOD PRESSURE: 126 MMHG | DIASTOLIC BLOOD PRESSURE: 67 MMHG

## 2024-09-02 VITALS — RESPIRATION RATE: 16 BRPM

## 2024-09-02 LAB
ALBUMIN SERPL-MCNC: 3.9 G/DL (ref 3.5–5)
ALP SERPL-CCNC: 67 U/L (ref 38–126)
ALT SERPL-CCNC: 22 U/L (ref 0–50)
AST SERPL-CCNC: 27 U/L (ref 17–59)
BUN SERPL-MCNC: 26 MG/DL (ref 9–20)
CALCIUM SERPL-MCNC: 9.1 MG/DL (ref 8.4–10.2)
CHLORIDE SERPL-SCNC: 101 MMOL/L (ref 98–107)
CO2 SERPL-SCNC: 27 MMOL/L (ref 22–30)
CRP SERPL-MCNC: 64.3 MG/L (ref 0–10)
D-DIMER: 3.55 UG/MLFEU (ref 0–0.5)
EGFR: > 60
ERYTHROCYTE [DISTWIDTH] IN BLOOD BY AUTOMATED COUNT: 16.3 % (ref 11.5–14.5)
ESTIMATED CREATININE CLEARANCE: 42 ML/MIN
FERRITIN SERPL-MCNC: 203 NG/ML (ref 17.9–464)
GLUCOSE SERPL-MCNC: 167 MG/DL (ref 70–99)
HCT VFR BLD AUTO: 35.5 % (ref 39–52)
HGB BLD-MCNC: 12.1 G/DL (ref 13–18)
MAGNESIUM SERPL-MCNC: 1.5 MG/DL (ref 1.6–2.3)
MCHC RBC AUTO-ENTMCNC: 34.1 G/DL (ref 33–37)
MCV RBC AUTO: 84.7 FL (ref 80–94)
NRBC BLD AUTO-RTO: 0 %
PLATELET # BLD AUTO: 153 10^3/UL (ref 130–400)
POTASSIUM SERPL-SCNC: 4.6 MMOL/L (ref 3.5–5.1)
PROCALCITONIN SERPL-MCNC: 0.11 NG/ML (ref 0–0.25)
PROT SERPL-MCNC: 6.4 G/DL (ref 6.3–8.2)
SODIUM SERPL-SCNC: 141 MMOL/L (ref 135–145)

## 2024-09-02 PROCEDURE — XW033E5 INTRODUCTION OF REMDESIVIR ANTI-INFECTIVE INTO PERIPHERAL VEIN, PERCUTANEOUS APPROACH, NEW TECHNOLOGY GROUP 5: ICD-10-PCS

## 2024-09-02 RX ADMIN — BISOPROLOL FUMARATE 5 MG: 5 TABLET, FILM COATED ORAL at 08:42

## 2024-09-02 RX ADMIN — GUAIFENESIN 600 MG: 600 TABLET, EXTENDED RELEASE ORAL at 08:41

## 2024-09-02 RX ADMIN — ASPIRIN 81 MG: 81 TABLET, COATED ORAL at 13:14

## 2024-09-02 RX ADMIN — AMLODIPINE BESYLATE 10 MG: 10 TABLET ORAL at 08:41

## 2024-09-02 RX ADMIN — Medication 1 CAP: at 08:41

## 2024-09-02 RX ADMIN — PANTOPRAZOLE SODIUM 40 MG: 40 TABLET, DELAYED RELEASE ORAL at 08:41

## 2024-09-02 RX ADMIN — Medication 84 MG: at 08:41

## 2024-09-02 RX ADMIN — SODIUM CHLORIDE 30: 900 INJECTION, SOLUTION INTRAVENOUS at 02:04

## 2024-09-02 RX ADMIN — EMPAGLIFLOZIN 10 MG: 10 TABLET, FILM COATED ORAL at 08:41

## 2024-09-02 RX ADMIN — MULTIVITAMIN TABLET 1 TABLET: TABLET at 13:14

## 2024-09-02 RX ADMIN — FOLIC ACID 1 MG: 1 TABLET ORAL at 13:14

## 2024-09-02 RX ADMIN — DEXAMETHASONE SODIUM PHOSPHATE 6 MG: 4 INJECTION, SOLUTION INTRA-ARTICULAR; INTRALESIONAL; INTRAMUSCULAR; INTRAVENOUS; SOFT TISSUE at 08:42

## 2024-09-02 RX ADMIN — Medication 1000 MG: at 13:14

## 2024-09-02 RX ADMIN — REMDESIVIR 250 MG: 100 INJECTION, POWDER, LYOPHILIZED, FOR SOLUTION INTRAVENOUS at 02:03

## 2024-09-02 RX ADMIN — TAMSULOSIN HYDROCHLORIDE 0.4 MG: 0.4 CAPSULE ORAL at 08:41

## 2024-09-02 RX ADMIN — ATORVASTATIN CALCIUM 40 MG: 40 TABLET, FILM COATED ORAL at 08:41

## 2024-09-02 RX ADMIN — MAGNESIUM SULFATE HEPTAHYDRATE 102 GRAMS: 500 INJECTION, SOLUTION INTRAMUSCULAR; INTRAVENOUS at 14:41

## 2024-09-02 RX ADMIN — RAMIPRIL 10 MG: 10 CAPSULE ORAL at 08:42

## 2024-09-02 NOTE — W.DCSUMMARY
"Discharge Summary"~"Discharge Data"~"Date of Admission: 09/01/24"~"Date of Discharge: 09/03/24"~"-"~"Pending Results: Yes"~"Additional Pending Results: "~"Blood cultures 9/2/24"~"Hospital Course"~"-: "~"Discharging Physician :  Dr. Shaver, Dr. Phillips"~"Disposition :  Home"~"Primary care physician :      Dr. Middleton"~"Principal Discharge diagnosis :  COVID-19, acute hypoxemic respiratory insufficiency, encephalopathy, leukocytosis "~"Chronic Discharge diagnosis :  CAD, HTN, HLD, AAA s/p repair, T2DM, ANA, PMR"~"Hospital Course :  Presented to ED for weakness and cough, and was found to have acute hypoxemic insufficiency, encephalopathy, and tested positive for COVID. He was treated with IV decadron 6mg IV qD and remdesivir, and rapidly improved. The "~"following day had no oxygen requirement and was able to ambulate on room air. His leukocytosis and encephalopathy resolved. His other chronic conditions were stable. On day of discharge he was stable. He was discharged home with short course of "~"decadron 6mg PO and an albuterol inhaler."~"Important imaging findings :  "~"Chest xray 9/1/24"~"IMPRESSION:"~"1.   Mildly decreased bilateral lung volumes with a mild amount of interstitial disease in both lower lungs (probably a combination of subsegmental atelectasis and scarring)."~"2.   Large bilateral pericardial fat pads."~"3.   Severe calcific atherosclerotic plaque in the thoracic aorta."~"4.   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 "~"Procedure findings :  N/A"~"Discharge Plan"~"-"~"Patient Disposition: Home (Routine Discharge)"~"Discharge Diagnosis/Procedures: Acute hypoxic insufficiency, encephalopathy, COVID-positive "~"Condition: Good"~"Diet: Diabetic, Carb Controlled"~"Activity: No restrictions"~"Driving Restrictions: As prior to admission"~"Bathing Restrictions: OK to Shower"~"Instructions:  COVID-19 in adults &#45; Discharge instructions, BLOOD PRESSURE"~"Referrals:"~"Ezequiel Middleton MD [Family Provider] - "~"Additional Discharge Medication Instructions: New medications: "~"- Take dexamethasone 6mg once per day from 9/3-9/5. (Do not take your regular prednisone on these days.)  "~" "~"Medication changes: "~"- Do not take your prednisone on the same days as dexamethasone (9/3-9/5). On 9/6, you can begin taking your regular prednisone 5mg once per day. "~"- Stop indapamide. Talk to your doctor before you restart it. "~"Prescriptions:"~"New"~"  dexamethasone 6 mg tablet "~"   6 mg PO DAILY Qty: 3 0RF"~"  albuterol sulfate 90 mcg/actuation aerosol powdr breath activated "~"   2 inh inhalation Q4H PRN (Reason: wheezing) Qty: 1 0RF"~"Continued"~"  atorvastatin 40 mg tablet "~"   40 mg PO DAILY "~"  omeprazole 40 mg capsule,delayed release(DR/EC) "~"   40 mg PO DAILY "~"  bisoprolol fumarate 5 mg tablet "~"   5 mg PO DAILY "~"  tamsulosin 0.4 mg capsule "~"   0.4 mg PO DAILY "~"  amlodipine 10 mg tablet "~"   10 mg PO DAILY "~"  ferrous sulfate [FeroSul] 325 mg (65 mg iron) tablet "~"   325 mg PO FR "~"  folic acid 1 mg tablet "~"   1 mg PO DAILY@1200 "~"  ramipril 10 mg capsule "~"   10 mg PO DAILY "~"  multivitamin  Tablet "~"   1 tab PO DAILY@1200 "~"  ascorbic acid (vitamin C) [Vitamin C] 1,000 mg Tablet "~"   1,000 mg PO DAILY@1200 "~"  lysine 1,000 mg Tablet "~"   1,000 mg PO DAILY@1200 "~"  aspirin 81 mg Tablet,Delayed Release (Dr/Ec) "~"   81 mg PO DAILY@1200 "~"  cholecalciferol (vitamin D3) [Vitamin D3] 25 mcg (1,000 unit) Tablet "~"   25 mcg PO MOWEFR "~"  magnesium chloride 64 mg Tablet,Delayed Release (Dr/Ec) "~"   64 mg PO BID "~"  ICaps AREDS   "~"   2 cap PO DAILY "~"  Jardiance   "~"   10 mg PO DAILY "~"Held"~"  prednisone 1 mg tablet "~"   5 mg PO DAILY "~"   Hold Instructions: restart after you finish your dexamethasone for COVID"~"Discontinued"~"  indapamide 2.5 mg tablet "~"   2.5 mg PO DAILY "~"Discharge Orders:"~"Discharge Patient  (As Directed); Ordered 09/02/24"~"   Ordered By:  Danielle Shaver"~"Discharge Date and Time"~"Discharge Date/Time: 09/02/24 17:34"~"Print Language: ENGLISH"

## 2024-09-02 NOTE — PTCARENOTE
"Received patient from ED. Patient feeling weak and was transferred directly to the bed form the [facility]. Patient assessed, AAOx3, lungs decreased, POX 94% on 2L. Reg heart, trace ankle edema, positive pulses, obese round abdomen, blanchable reddened "~"bottom. He voided in the urinal.  Denies pain at this time. VSS. Patient verbalized an understanding to ring for transfers. Teach back demonstrated with call bell. Call bell in reach. Bed alarm placed. "

## 2024-09-02 NOTE — CM
"Reviewed the chart notes and spoke with the patient via telephone due to Covid + status.  Patient resides with spouse in a two story home with five steps to enter.  The patient reports having some DME in the home from previous spouses.  Patient's "~"pharmacy is [facility] [address] [place].  The patient is for discharge today.  Patient's spouse will provide transportation.  CM continues to be available to patient/family and is monitoring medical plan for needs at discharge. "~"Plan:  Discharge to home today.  No needs identified at this time.  "

## 2024-10-02 LAB
ALBUMIN SERPL-MCNC: 4.2 G/DL (ref 3.7–4.7)
ALP SERPL-CCNC: 65 IU/L (ref 44–121)
ALT SERPL-CCNC: 21 IU/L (ref 0–44)
AST SERPL-CCNC: 26 IU/L (ref 0–40)
BASOPHILS # BLD AUTO: 0.1 X10E3/UL (ref 0–0.2)
BASOPHILS NFR BLD AUTO: 1 %
BILIRUB DIRECT SERPL-MCNC: 0.17 MG/DL (ref 0–0.4)
BILIRUB SERPL-MCNC: 0.5 MG/DL (ref 0–1.2)
BUN SERPL-MCNC: 26 MG/DL (ref 8–27)
BUN/CREAT SERPL: 22 (ref 10–24)
CALCIUM SERPL-MCNC: 9.2 MG/DL (ref 8.6–10.2)
CHLORIDE SERPL-SCNC: 106 MMOL/L (ref 96–106)
CO2 SERPL-SCNC: 23 MMOL/L (ref 20–29)
CREAT SERPL-MCNC: 1.17 MG/DL (ref 0.76–1.27)
CRP SERPL-MCNC: 3 MG/L (ref 0–10)
EGFR: 60 ML/MIN/1.73
EOSINOPHIL # BLD AUTO: 0.4 X10E3/UL (ref 0–0.4)
EOSINOPHIL NFR BLD AUTO: 5 %
ERYTHROCYTE [DISTWIDTH] IN BLOOD BY AUTOMATED COUNT: 15.3 % (ref 11.6–15.4)
ERYTHROCYTE [SEDIMENTATION RATE] IN BLOOD BY WESTERGREN METHOD: 35 MM/HR (ref 0–30)
GAMMA INTERFERON BACKGROUND BLD IA-ACNC: 0.03 IU/ML
GLUCOSE SERPL-MCNC: 118 MG/DL (ref 70–99)
HCT VFR BLD AUTO: 37.4 % (ref 37.5–51)
HGB BLD-MCNC: 12.1 G/DL (ref 13–17.7)
IMM GRANULOCYTES # BLD: 0.1 X10E3/UL (ref 0–0.1)
IMM GRANULOCYTES NFR BLD: 1 %
LYMPHOCYTES # BLD AUTO: 1.6 X10E3/UL (ref 0.7–3.1)
LYMPHOCYTES NFR BLD AUTO: 23 %
M TB IFN-G CD4+ T-CELLS BLD-ACNC: 0.03 IU/ML
M TB IFN-G CD4+ T-CELLS BLD-ACNC: 0.04 IU/ML
MCH RBC QN AUTO: 29 PG (ref 26.6–33)
MCHC RBC AUTO-ENTMCNC: 32.4 G/DL (ref 31.5–35.7)
MCV RBC AUTO: 90 FL (ref 79–97)
MITOGEN IGNF BLD-ACNC: >10 IU/ML
MONOCYTES # BLD AUTO: 0.6 X10E3/UL (ref 0.1–0.9)
MONOCYTES NFR BLD AUTO: 8 %
NEUTROPHILS # BLD AUTO: 4.4 X10E3/UL (ref 1.4–7)
NEUTROPHILS NFR BLD AUTO: 62 %
PLATELET # BLD AUTO: 203 X10E3/UL (ref 150–450)
POTASSIUM SERPL-SCNC: 4.4 MMOL/L (ref 3.5–5.2)
PROT SERPL-MCNC: 6.7 G/DL (ref 6–8.5)
QUANTIFERON INCUBATION COMMENT: NORMAL
QUANTIFERON-TB GOLD PLUS: NEGATIVE
RBC # BLD AUTO: 4.17 X10E6/UL (ref 4.14–5.8)
SERVICE CMNT-IMP: NORMAL
SODIUM SERPL-SCNC: 143 MMOL/L (ref 134–144)
WBC # BLD AUTO: 7.1 X10E3/UL (ref 3.4–10.8)

## 2024-11-07 DIAGNOSIS — I10 ESSENTIAL HYPERTENSION: ICD-10-CM

## 2024-11-07 DIAGNOSIS — R35.0 BENIGN PROSTATIC HYPERPLASIA WITH URINARY FREQUENCY: ICD-10-CM

## 2024-11-07 DIAGNOSIS — N40.1 BENIGN PROSTATIC HYPERPLASIA WITH URINARY FREQUENCY: ICD-10-CM

## 2024-11-08 RX ORDER — INDAPAMIDE 2.5 MG/1
2.5 TABLET ORAL DAILY
Qty: 30 TABLET | Refills: 0 | Status: SHIPPED | OUTPATIENT
Start: 2024-11-08

## 2024-11-08 RX ORDER — TAMSULOSIN HYDROCHLORIDE 0.4 MG/1
0.4 CAPSULE ORAL DAILY
Qty: 30 CAPSULE | Refills: 0 | Status: SHIPPED | OUTPATIENT
Start: 2024-11-08

## 2024-12-20 ENCOUNTER — HOSPITAL ENCOUNTER (OUTPATIENT)
Dept: HOSPITAL 99 - PAVMRI | Age: 89
End: 2024-12-20
Payer: COMMERCIAL

## 2024-12-20 DIAGNOSIS — M54.16: Primary | ICD-10-CM

## 2025-01-03 DIAGNOSIS — N40.1 BENIGN PROSTATIC HYPERPLASIA WITH URINARY FREQUENCY: ICD-10-CM

## 2025-01-03 DIAGNOSIS — R35.0 BENIGN PROSTATIC HYPERPLASIA WITH URINARY FREQUENCY: ICD-10-CM

## 2025-01-06 RX ORDER — TAMSULOSIN HYDROCHLORIDE 0.4 MG/1
0.4 CAPSULE ORAL DAILY
Qty: 30 CAPSULE | Refills: 0 | Status: SHIPPED | OUTPATIENT
Start: 2025-01-06

## 2025-01-29 DIAGNOSIS — N40.1 BENIGN PROSTATIC HYPERPLASIA WITH URINARY FREQUENCY: ICD-10-CM

## 2025-01-29 DIAGNOSIS — R35.0 BENIGN PROSTATIC HYPERPLASIA WITH URINARY FREQUENCY: ICD-10-CM

## 2025-01-29 RX ORDER — TAMSULOSIN HYDROCHLORIDE 0.4 MG/1
0.4 CAPSULE ORAL DAILY
Qty: 30 CAPSULE | Refills: 0 | OUTPATIENT
Start: 2025-01-29

## 2025-06-09 ENCOUNTER — TELEPHONE (OUTPATIENT)
Age: 89
End: 2025-06-09

## 2025-06-09 NOTE — TELEPHONE ENCOUNTER
Caller:  Care management benefit [facility]     Doctor: Dr. Macdonald    Reason for call:  [facility] calling with Auth Number - [id number] ref for one exams coming up.   Exam that was approved was CPT - 98108    Call back#:  [phone]

## 2025-07-16 ENCOUNTER — HOSPITAL ENCOUNTER (OUTPATIENT)
Dept: NON INVASIVE DIAGNOSTICS | Age: 89
Discharge: HOME | End: 2025-07-16
Attending: SURGERY
Payer: COMMERCIAL

## 2025-07-16 DIAGNOSIS — Z86.79 S/P ENDOVASCULAR ANEURYSM REPAIR: ICD-10-CM

## 2025-07-16 DIAGNOSIS — Z98.890 S/P ENDOVASCULAR ANEURYSM REPAIR: ICD-10-CM

## 2025-07-16 DIAGNOSIS — I77.89 ECTASIA OF ARTERY (HCC): ICD-10-CM

## 2025-07-16 PROCEDURE — 93923 UPR/LXTR ART STDY 3+ LVLS: CPT

## 2025-07-16 PROCEDURE — 93925 LOWER EXTREMITY STUDY: CPT

## 2025-07-16 PROCEDURE — 93978 VASCULAR STUDY: CPT

## 2025-07-18 PROCEDURE — 93925 LOWER EXTREMITY STUDY: CPT | Performed by: SURGERY

## 2025-07-18 PROCEDURE — 93978 VASCULAR STUDY: CPT | Performed by: SURGERY

## 2025-07-18 PROCEDURE — 93922 UPR/L XTREMITY ART 2 LEVELS: CPT | Performed by: SURGERY

## 2025-07-23 ENCOUNTER — TELEPHONE (OUTPATIENT)
Dept: VASCULAR SURGERY | Facility: CLINIC | Age: 89
End: 2025-07-23

## 2025-07-23 NOTE — TELEPHONE ENCOUNTER
Attempted to contact patient to schedule appointment(s) listed below.  Requested patient call [phone] to schedule appointment(s).    Patient's appointment(s) are due now.    Dopplers  [] Abdominal Aorta Iliac (AOIL)  [] Carotid (CV)   [] Celiac and/or Mesenteric  [x] Endovascular Aortic Repair (EVAR)   [] Hemodialysis Access (HD)   [x] Lower Limb Arterial (REY)  [] Lower Limb Venous (LEV)  [] Lower Limb Venous Duplex with Reflux (LEVDR)  [] Renal Artery  [] Upper Limb Arterial (UEA)    [] Upper Limb Venous (UEV)              [] WELLINGTON and Waveform analysis     Advanced Imaging   [] CTA head/neck    [] CTA abdomen    [] CTA abdomen & pelvis    [] CT abdomen with/ without contrast  [] CT abdomen with contrast  [] CT abdomen without contrast    [] CT abdomen & pelvis with/ without contrast  [] CT abdomen & pelvis with contrast  [] CT abdomen & pelvis without contrast    Office Visit   [] New patient, patient last seen over 3 years ago  [] Risk factor modification (RFM)   [x] Follow up   [] Lost to follow up (LTFU)   Called patient & LMOM to schedule ov w/Dr. Wendy Conner per consensus/RR EVAR/REY 7/16/25 /please not appt is tentatively scheduled for 8/11 @ 9:00 am Please confirm if patient calls back

## (undated) DEVICE — COVER PROBE ULTRASOUND

## (undated) DEVICE — FLEXOR, CHECK-FLO, INTRODUCER ANSEL 1 MODIFICATION - WITH HIGH-FLEX DILATOR AND HYDROPHILIC COATING: Brand: FLEXOR

## (undated) DEVICE — BETHLEHEM MAJOR GENERAL PACK: Brand: CARDINAL HEALTH

## (undated) DEVICE — NON-DEHP HIGH FLOW RATE EXTENSION SET, MALE LUER LOCK ADAPTER

## (undated) DEVICE — 3000CC GUARDIAN II: Brand: GUARDIAN

## (undated) DEVICE — SI BRITE TIP 8F 11CM STR: Brand: BRITE TIP

## (undated) DEVICE — SNAP KOVER: Brand: UNBRANDED

## (undated) DEVICE — INFUSER BAG 500ML

## (undated) DEVICE — GLOVE SRG BIOGEL ORTHOPEDIC 7.5

## (undated) DEVICE — MICROPUNCTURE 501

## (undated) DEVICE — SYRINGE 50ML LL

## (undated) DEVICE — PAD GROUNDING ADULT

## (undated) DEVICE — OLCOTT TORQUE DEVICE: Brand: OLCOTT

## (undated) DEVICE — 1200CC GUARDIAN II: Brand: GUARDIAN

## (undated) DEVICE — SHEATH INTRO DRY SEAL 18FR 33CM

## (undated) DEVICE — GUIDEWIRE WHOLEY HI TORQUE INTERM MOD J .035 145CM

## (undated) DEVICE — IV SET 15 DROP STERILE 0/Y GRAVITY

## (undated) DEVICE — TAD TAPERED GUIDE WIRE SYSTEM WITH MICROGLIDE COATING .035 145 CM: Brand: TAD

## (undated) DEVICE — CATH BALLOON STENT GRAFT 12FR 40MM X 65CM

## (undated) DEVICE — INTENDED FOR TISSUE SEPARATION, AND OTHER PROCEDURES THAT REQUIRE A SHARP SURGICAL BLADE TO PUNCTURE OR CUT.: Brand: BARD-PARKER SAFETY BLADES SIZE 15, STERILE

## (undated) DEVICE — SYRINGE 20ML LL

## (undated) DEVICE — STERILE ICS CARDIOVASCULAR PK: Brand: CARDINAL HEALTH

## (undated) DEVICE — FLUID MANAGEMENT KIT - IR

## (undated) DEVICE — CATH DIAG 5FR .035 70CM PIG CSC 20

## (undated) DEVICE — PINNACLE R/O II INTRODUCER SHEATH WITH RADIOPAQUE MARKER: Brand: PINNACLE

## (undated) DEVICE — 3M™ STERI-STRIP™ REINFORCED ADHESIVE SKIN CLOSURES, R1547, 1/2 IN X 4 IN (12 MM X 100 MM), 6 STRIPS/ENVELOPE: Brand: 3M™ STERI-STRIP™

## (undated) DEVICE — TRAY FOLEY 16FR URIMETER SURESTEP

## (undated) DEVICE — PROXIMATE PLUS MD MULTI-DIRECTIONAL RELEASE SKIN STAPLERS CONTAINS 35 STAINLESS STEEL STAPLES APPROXIMATE CLOSED DIMENSIONS: 6.9MM X 3.9MM WIDE: Brand: PROXIMATE

## (undated) DEVICE — PRESTO™ INFLATION DEVICE: Brand: PRESTO

## (undated) DEVICE — Device

## (undated) DEVICE — X-RAY DETECTABLE SPONGES,16 PLY: Brand: VISTEC

## (undated) DEVICE — GUIDEWIRE AMPLATZ SS .038 180CM

## (undated) DEVICE — COVER PROBE INTRAOPERATIVE 6 X 96 IN

## (undated) DEVICE — SYRINGE KIT,PACKAGED,,150FT,MK 7(ANGIO-ARTERION, 150ML SYR KIT W/QFT,MC)(60729385): Brand: MEDRAD® MARK 7 ARTERION DISPOSABLE SYRINGE 150 ML WITH QUICK FILL TUBE

## (undated) DEVICE — DILATOR: Brand: COOK

## (undated) DEVICE — STOPCOCK 4 WAY LL HIGH PRESSURE

## (undated) DEVICE — 3M™ IOBAN™ 2 ANTIMICROBIAL INCISE DRAPE 6651EZ: Brand: IOBAN™ 2

## (undated) DEVICE — ADHESIVE SKN CLSR HISTOACRYL FLEX 0.5ML LF

## (undated) DEVICE — GUIDEWIRE AMPLATZ .035 180CM 6CM ST SS

## (undated) DEVICE — MICROPUNCTURE INTRODUCER SET SILHOUETTE TRANSITIONLESS PUSH-PLUS DESIGN - STIFFENED CANNULA WITH NITINOL WIRE GUIDE: Brand: MICROPUNCTURE

## (undated) DEVICE — CATH ANGIO 4FR 70CM  2CM SEGMENT ACCU-VU

## (undated) DEVICE — RADIFOCUS GLIDEWIRE: Brand: GLIDEWIRE